# Patient Record
Sex: MALE | Race: WHITE | NOT HISPANIC OR LATINO | Employment: OTHER | URBAN - METROPOLITAN AREA
[De-identification: names, ages, dates, MRNs, and addresses within clinical notes are randomized per-mention and may not be internally consistent; named-entity substitution may affect disease eponyms.]

---

## 2017-01-09 ENCOUNTER — ALLSCRIPTS OFFICE VISIT (OUTPATIENT)
Dept: OTHER | Facility: OTHER | Age: 77
End: 2017-01-09

## 2017-04-21 ENCOUNTER — GENERIC CONVERSION - ENCOUNTER (OUTPATIENT)
Dept: OTHER | Facility: OTHER | Age: 77
End: 2017-04-21

## 2017-04-21 LAB
A/G RATIO (HISTORICAL): 1.5 (ref 1.2–2.2)
ALBUMIN SERPL BCP-MCNC: 4.2 G/DL (ref 3.5–4.8)
ALP SERPL-CCNC: 40 IU/L (ref 39–117)
ALT SERPL W P-5'-P-CCNC: 19 IU/L (ref 0–44)
AST SERPL W P-5'-P-CCNC: 24 IU/L (ref 0–40)
BILIRUB SERPL-MCNC: 0.6 MG/DL (ref 0–1.2)
BUN SERPL-MCNC: 38 MG/DL (ref 8–27)
BUN/CREA RATIO (HISTORICAL): 28 (ref 10–24)
CALCIUM SERPL-MCNC: 9 MG/DL (ref 8.6–10.2)
CHLORIDE SERPL-SCNC: 100 MMOL/L (ref 96–106)
CO2 SERPL-SCNC: 22 MMOL/L (ref 18–29)
CREAT SERPL-MCNC: 1.37 MG/DL (ref 0.76–1.27)
EGFR AFRICAN AMERICAN (HISTORICAL): 57 ML/MIN/1.73
EGFR-AMERICAN CALC (HISTORICAL): 49 ML/MIN/1.73
GLUCOSE SERPL-MCNC: 81 MG/DL (ref 65–99)
INTERPRETATION (HISTORICAL): NORMAL
PDF IMAGE (HISTORICAL): NORMAL
POTASSIUM SERPL-SCNC: 4.2 MMOL/L (ref 3.5–5.2)
SODIUM SERPL-SCNC: 140 MMOL/L (ref 134–144)
TOT. GLOBULIN, SERUM (HISTORICAL): 2.8 G/DL (ref 1.5–4.5)
TOTAL PROTEIN (HISTORICAL): 7 G/DL (ref 6–8.5)

## 2017-04-24 ENCOUNTER — GENERIC CONVERSION - ENCOUNTER (OUTPATIENT)
Dept: OTHER | Facility: OTHER | Age: 77
End: 2017-04-24

## 2017-07-25 ENCOUNTER — GENERIC CONVERSION - ENCOUNTER (OUTPATIENT)
Dept: OTHER | Facility: OTHER | Age: 77
End: 2017-07-25

## 2017-07-25 LAB
A/G RATIO (HISTORICAL): 1.5 (ref 1.2–2.2)
ALBUMIN SERPL BCP-MCNC: 4.3 G/DL (ref 3.5–4.8)
ALP SERPL-CCNC: 40 IU/L (ref 39–117)
ALT SERPL W P-5'-P-CCNC: 20 IU/L (ref 0–44)
AST SERPL W P-5'-P-CCNC: 25 IU/L (ref 0–40)
BASOPHILS # BLD AUTO: 0 X10E3/UL (ref 0–0.2)
BASOPHILS # BLD AUTO: 1 %
BILIRUB SERPL-MCNC: 0.4 MG/DL (ref 0–1.2)
BUN SERPL-MCNC: 36 MG/DL (ref 8–27)
BUN/CREA RATIO (HISTORICAL): 27 (ref 10–24)
CALCIUM SERPL-MCNC: 9.2 MG/DL (ref 8.6–10.2)
CHLORIDE SERPL-SCNC: 101 MMOL/L (ref 96–106)
CHOLEST SERPL-MCNC: 213 MG/DL (ref 100–199)
CHOLEST/HDLC SERPL: 3.4 RATIO UNITS (ref 0–5)
CO2 SERPL-SCNC: 27 MMOL/L (ref 18–29)
CREAT SERPL-MCNC: 1.31 MG/DL (ref 0.76–1.27)
DEPRECATED RDW RBC AUTO: 13.7 % (ref 12.3–15.4)
EGFR AFRICAN AMERICAN (HISTORICAL): 60 ML/MIN/1.73
EGFR-AMERICAN CALC (HISTORICAL): 52 ML/MIN/1.73
EOSINOPHIL # BLD AUTO: 0.2 X10E3/UL (ref 0–0.4)
EOSINOPHIL # BLD AUTO: 4 %
GLUCOSE SERPL-MCNC: 93 MG/DL (ref 65–99)
HCT VFR BLD AUTO: 40 % (ref 37.5–51)
HDLC SERPL-MCNC: 62 MG/DL
HGB BLD-MCNC: 13 G/DL (ref 12.6–17.7)
IMM.GRANULOCYTES (CD4/8) (HISTORICAL): 0 %
IMM.GRANULOCYTES (CD4/8) (HISTORICAL): 0 X10E3/UL (ref 0–0.1)
INTERPRETATION (HISTORICAL): NORMAL
INTERPRETATION (HISTORICAL): NORMAL
LDLC SERPL CALC-MCNC: 138 MG/DL (ref 0–99)
LYMPHOCYTES # BLD AUTO: 1.5 X10E3/UL (ref 0.7–3.1)
LYMPHOCYTES # BLD AUTO: 32 %
MCH RBC QN AUTO: 29 PG (ref 26.6–33)
MCHC RBC AUTO-ENTMCNC: 32.5 G/DL (ref 31.5–35.7)
MCV RBC AUTO: 89 FL (ref 79–97)
MONOCYTES # BLD AUTO: 0.4 X10E3/UL (ref 0.1–0.9)
MONOCYTES (HISTORICAL): 8 %
NEUTROPHILS # BLD AUTO: 2.6 X10E3/UL (ref 1.4–7)
NEUTROPHILS # BLD AUTO: 55 %
PDF IMAGE (HISTORICAL): NORMAL
PLATELET # BLD AUTO: 158 X10E3/UL (ref 150–379)
POTASSIUM SERPL-SCNC: 4.8 MMOL/L (ref 3.5–5.2)
RBC (HISTORICAL): 4.49 X10E6/UL (ref 4.14–5.8)
SODIUM SERPL-SCNC: 141 MMOL/L (ref 134–144)
TOT. GLOBULIN, SERUM (HISTORICAL): 2.8 G/DL (ref 1.5–4.5)
TOTAL PROTEIN (HISTORICAL): 7.1 G/DL (ref 6–8.5)
TRIGL SERPL-MCNC: 63 MG/DL (ref 0–149)
VLDLC SERPL CALC-MCNC: 13 MG/DL (ref 5–40)
WBC # BLD AUTO: 4.6 X10E3/UL (ref 3.4–10.8)

## 2017-07-26 ENCOUNTER — GENERIC CONVERSION - ENCOUNTER (OUTPATIENT)
Dept: OTHER | Facility: OTHER | Age: 77
End: 2017-07-26

## 2017-07-31 ENCOUNTER — ALLSCRIPTS OFFICE VISIT (OUTPATIENT)
Dept: OTHER | Facility: OTHER | Age: 77
End: 2017-07-31

## 2017-07-31 DIAGNOSIS — R07.9 CHEST PAIN: ICD-10-CM

## 2018-01-10 NOTE — RESULT NOTES
Verified Results  * [facility] CHEST PA & LATERAL 18Apr2016 09:38AM Alexa Calvo Order Number: [id number]     Test Name Result Flag Reference   XR CHEST PA & LATERAL (Report)     CHEST     INDICATION: Follow-up pneumonia  COMPARISON: 3/21/2016  VIEWS: Frontal and lateral projections; 2 images     FINDINGS:         The cardiomediastinal silhouette is unremarkable  Near complete resolution of left lung base pneumonia is noted  Osseous structures are age appropriate  IMPRESSION:     Near complete resolution of left lung base pneumonia  Workstation performed: [id number]     Signed by: Karson Contreras MD   4/18/16     * XR CHEST PA & LATERAL [id number] 09:11AM Alexa Calvo Order Number: [id number]     Test Name Result Flag Reference   XR CHEST PA & LATERAL (Report)     CHEST - DUAL ENERGY     INDICATION: Productive cough for 1 month  COMPARISON: 10/21/2009, CT chest 8/18/2012     VIEWS: PA (including soft tissue/bone algorithms) and lateral projections; 4 images     FINDINGS:        Cardiomediastinal silhouette appears unremarkable  Left lower lobe airspace disease most compatible with pneumonia in the appropriate clinical scenario  Lungs are otherwise clear  No pleural effusions  Mild lower thoracic dextroscoliosis with multilevel degenerative changes  IMPRESSION:     Left lower lobe pneumonia  Follow-up recommended after clinical treatment until resolution  ##imslh##imslh     ##fuslh2##fuslh2        Workstation performed: [id number]     Signed by:   Camron Vinson DO   3/21/16       Plan  Pneumonia of left lower lobe due to infectious organism    · * XR CHEST PA & LATERAL; Status:Active;  Requested for:42Yon3735;

## 2018-01-10 NOTE — PROGRESS NOTES
Assessment    1  Encounter for preventive health examination (V70 0) (Z00 00)   2  Former smoker (V15 82) (Q17 395)   3  BMI 25 0-25 9,adult (V85 21) (Z68 25)   4  Overweight (BMI 25 0-29 9) (278 02) (E66 3)   5  Encounter for screening for cardiovascular disorders (V81 2) (Z13 6)   6  Encounter for screening for malignant neoplasm of prostate (V76 44) (Z12 5)   7  Bladder cancer (188 9) (C67 9)   8  Organic impotence (607 84) (N52 9)    Plan  BMI 25 0-25 9,adult, Encounter for screening for cardiovascular disorders, Encounter for  screening for malignant neoplasm of prostate, Overweight (BMI 25 0-29  9)    · (1) CBC/PLT/DIFF; Status:Active; Requested for:71Ouj7141;    · (1) COMPREHENSIVE METABOLIC PANEL; Status:Active; Requested for:94Aqn6447;    · (1) LIPID PANEL, FASTING; Status:Active; Requested for:12Xyd8463;    · (1) PSA (SCREEN) (Dx V76 44 Screen for Prostate Cancer); Status:Active; Requested  for:56Ino3887; Health Maintenance    · DIGITAL RECTAL EXAM; Status:Complete;   Done: 13HAJ1399 01:28PM   · Hemoccult Screening - POC; Status:Complete;   Done: 36AEX1498 01:27PM  Organic impotence    · Viagra 100 MG Oral Tablet; TAKE 1 TABLET ONE HOUR PRIOR TO SEXUAL  ACTIVITY    Discussion/Summary  Impression: health maintenance visit  Pt will have follow up august 2017 with urology for the bladder  Chief Complaint  CPE rmklpn      History of Present Illness  HM, Adult Male: The patient is being seen for a health maintenance evaluation  General Health:   Screening:   HPI: pt is here for a full physical    pt states he feels well    pt already had his flu shot      Review of Systems    Constitutional: No fever or chills, feels well, no tiredness, no recent weight gain or weight loss  Eyes: No complaints of eye pain, no red eyes, no discharge from eyes, no itchy eyes  ENT: no complaints of earache, no hearing loss, no nosebleeds, no nasal discharge, no sore throat, no hoarseness     Cardiovascular: No complaints of slow heart rate, no fast heart rate, no chest pain, no palpitations, no leg claudication, no lower extremity  Respiratory: No complaints of shortness of breath, no wheezing, no cough, no SOB on exertion, no orthopnea or PND  Gastrointestinal: No complaints of abdominal pain, no constipation, no nausea or vomiting, no diarrhea or bloody stools  Genitourinary: No complaints of dysuria, no incontinence, no hesitancy, no nocturia, no genital lesion, no testicular pain  Musculoskeletal: No complaints of arthralgia, no myalgias, no joint swelling or stiffness, no limb pain or swelling  Integumentary: No complaints of skin rash or skin lesions, no itching, no skin wound, no dry skin  Neurological: No compliants of headache, no confusion, no convulsions, no numbness or tingling, no dizziness or fainting, no limb weakness, no difficulty walking  Psychiatric: Is not suicidal, no sleep disturbances, no anxiety or depression, no change in personality, no emotional problems  Endocrine: No complaints of proptosis, no hot flashes, no muscle weakness, no erectile dysfunction, no deepening of the voice, no feelings of weakness  Hematologic/Lymphatic: No complaints of swollen glands, no swollen glands in the neck, does not bleed easily, no easy bruising  Active Problems    1  Bladder cancer (188 9) (C67 9)   2  Former smoker (V15 82) (C96 898)   3  Murmur (785 2) (R01 1)   4  Needs flu shot (V04 81) (Z23)   5  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)   6  Organic impotence (607 84) (N52 9)   7  Poor vision (369 9) (H54 7)   8  Rosacea (695 3) (L71 9)   9  Thrombocytopenia (287 5) (D69 6)   10  Transient ischemic attack (435 9) (G45 9)   11   Well adult on routine health check (V70 0) (Z00 00)    Past Medical History    · History of Acute gastroenteritis (558 9) (K52 9)   · History of Acute maxillary sinusitis (461 0) (J01 00)   · History of Brachial neuritis (723 4) (M54 12)   · History of benign neoplasm of pituitary gland (V12 29) (Z86 018)   · History of dermatitis (V13 3) (Z87 2)   · History of pneumonia (V12 61) (Z87 01)   · History of transient cerebral ischemia (V12 54) (Z86 73)   · History of Noninfectious gastroenteritis (558 9) (K52 9)   · History of Organic impotence (607 84) (N52 9)   · History of Pneumonia of left lower lobe due to infectious organism (483 8) (J18 9)    Surgical History    · History of Brain Surgery    Family History  Mother    · Family history of Family history unobtainable due to orphan status  Family History    · Family history of No Significant Family History    Social History    · Former smoker (V15 82) (S04 029)    Current Meds   1  No Reported Medications Recorded    Allergies    1  No Known Drug Allergies    Vitals   Recorded: 98LNG2938 12:59PM   Temperature 97 1 F   Heart Rate 74   Respiration 18   Systolic 420   Diastolic 66   Height 5 ft 5 5 in   Weight 155 lb    BMI Calculated 25 4   BSA Calculated 1 78     Physical Exam    Constitutional   General appearance: No acute distress, well appearing and well nourished  Eyes   Conjunctiva and lids: No erythema, swelling or discharge  Pupils and irises: Equal, round, reactive to light  Ophthalmoscopic examination: Normal fundi and optic discs  Ears, Nose, Mouth, and Throat   External inspection of ears and nose: Normal     Otoscopic examination: Tympanic membranes translucent with normal light reflex  Canals patent without erythema  Hearing: Normal     Nasal mucosa, septum, and turbinates: Normal without edema or erythema  Lips, teeth, and gums: Normal, good dentition  Oropharynx: Normal with no erythema, edema, exudate or lesions  Neck   Neck: Supple, symmetric, trachea midline, no masses  Thyroid: Normal, no thyromegaly  Pulmonary   Respiratory effort: No increased work of breathing or signs of respiratory distress      Percussion of chest: Normal     Palpation of chest: Normal     Auscultation of lungs: Clear to auscultation  Cardiovascular   Palpation of heart: Normal PMI, no thrills  Auscultation of heart: Normal rate and rhythm, normal S1 and S2, no murmurs  Carotid pulses: 2+ bilaterally  Abdominal aorta: Normal     Femoral pulses: 2+ bilaterally  Pedal pulses: 2+ bilaterally  Examination of extremities for edema and/or varicosities: Normal     Chest   Breasts: Normal, no dimpling or skin changes appreciated  Palpation of breasts and axillae: Normal, no masses palpated  Chest: Normal     Abdomen   Abdomen: Non-tender, no masses  Liver and spleen: No hepatomegaly or splenomegaly  Examination for hernias: No hernias appreciated  Anus, perineum, and rectum: Normal sphincter tone, no masses, no prolapse  Stool sample for occult blood: Negative  Genitourinary   Scrotal contents: Normal testes, no masses  Penis: Normal, no lesions  Digital rectal exam of prostate: Normal size, no masses  Lymphatic   Palpation of lymph nodes in neck: No lymphadenopathy  Palpation of lymph nodes in axillae: No lymphadenopathy  Palpation of lymph nodes in groin: No lymphadenopathy  Palpation of lymph nodes in other areas: No lymphadenopathy  Musculoskeletal   Gait and station: Normal     Inspection/palpation of digits and nails: Normal without clubbing or cyanosis  Inspection/palpation of joints, bones, and muscles: Normal     Range of motion: Normal     Stability: Normal     Muscle strength/tone: Normal     Skin   Skin and subcutaneous tissue: Normal without rashes or lesions  Palpation of skin and subcutaneous tissue: Normal turgor  Neurologic   Cranial nerves: Cranial nerves 2-12 intact  Reflexes: 2+ and symmetric  Sensation: No sensory loss  Psychiatric   Judgment and insight: Normal     Orientation to person, place and time: Normal     Recent and remote memory: Intact      Mood and affect: Normal  Procedure    Procedure: Visual Acuity Test    Indication: routine screening  Inforrmation supplied by a Snellen chart     Results: 20/25 in both eyes with corrective device, 20/25 in the right eye with corrective device, 20/25 in the left eye with corrective device      Signatures   Electronically signed by : Franki Moreno DO; Dec  6 2016  1:29PM EST                       (Author)

## 2018-01-13 VITALS
DIASTOLIC BLOOD PRESSURE: 62 MMHG | RESPIRATION RATE: 18 BRPM | WEIGHT: 160 LBS | SYSTOLIC BLOOD PRESSURE: 110 MMHG | BODY MASS INDEX: 25.71 KG/M2 | HEIGHT: 66 IN | HEART RATE: 78 BPM | TEMPERATURE: 97.8 F

## 2018-01-13 NOTE — RESULT NOTES
Verified Results  * XR CHEST PA & LATERAL [id number] 09:11AM Ernestina Sanchez Order Number: [id number]     Test Name Result Flag Reference   XR CHEST PA & LATERAL (Report)     CHEST - DUAL ENERGY     INDICATION: Productive cough for 1 month  COMPARISON: 10/21/2009, CT chest 8/18/2012     VIEWS: PA (including soft tissue/bone algorithms) and lateral projections; 4 images     FINDINGS:        Cardiomediastinal silhouette appears unremarkable  Left lower lobe airspace disease most compatible with pneumonia in the appropriate clinical scenario  Lungs are otherwise clear  No pleural effusions  Mild lower thoracic dextroscoliosis with multilevel degenerative changes  IMPRESSION:     Left lower lobe pneumonia  Follow-up recommended after clinical treatment until resolution         ##imslh##imslh     ##fuslh2##fuslh2        Workstation performed: [id number]     Signed by:   Phil Davis DO   3/21/16

## 2018-01-14 VITALS
WEIGHT: 154 LBS | TEMPERATURE: 96.3 F | RESPIRATION RATE: 16 BRPM | BODY MASS INDEX: 24.75 KG/M2 | HEIGHT: 66 IN | SYSTOLIC BLOOD PRESSURE: 122 MMHG | DIASTOLIC BLOOD PRESSURE: 70 MMHG | HEART RATE: 82 BPM

## 2018-01-15 NOTE — RESULT NOTES
Verified Results  (1) CBC/PLT/DIFF [id number] 08:27AM Juli Slim     Test Name Result Flag Reference   WBC 4 8 x10E3/uL  3 4-10 8   RBC 4 48 x10E6/uL  4 14-5 80   Hemoglobin 13 1 g/dL  12 6-17 7   Hematocrit 40 6 %  37 5-51 0   MCV 91 fL  79-97   MCH 29 2 pg  26 6-33 0   MCHC 32 3 g/dL  31 5-35 7   RDW 13 2 %  12 3-15 4   Platelets 953 W39Q0/XS L 150-379   Neutrophils 59 %     Lymphs 29 %     Monocytes 8 %     Eos 3 %     Basos 1 %     Neutrophils (Absolute) 2 8 x10E3/uL  1 4-7 0   Lymphs (Absolute) 1 4 x10E3/uL  0 7-3 1   Monocytes(Absolute) 0 4 x10E3/uL  0 1-0 9   Eos (Absolute) 0 2 x10E3/uL  0 0-0 4   Baso (Absolute) 0 0 x10E3/uL  0 0-0 2   Immature Granulocytes 0 %     Immature Grans (Abs) 0 0 x10E3/uL  0 0-0 1     (1) COMPREHENSIVE METABOLIC PANEL [id number] 23:52FX Kalpana Slim     Test Name Result Flag Reference   Glucose, Serum 90 mg/dL  65-99   BUN 37 mg/dL H 8-27   Creatinine, Serum 1 41 mg/dL H 0 76-1 27   eGFR If NonAfricn Am 48 mL/min/1 73 L >59   eGFR If Africn Am 55 mL/min/1 73 L >59   BUN/Creatinine Ratio 26 H 10-22   Sodium, Serum 143 mmol/L  134-144   **Please note reference interval change**   Potassium, Serum 5 0 mmol/L  3 5-5 2   Chloride, Serum 104 mmol/L     **Please note reference interval change**   Carbon Dioxide, Total 27 mmol/L  18-29   Calcium, Serum 9 2 mg/dL  8 6-10 2   Protein, Total, Serum 7 0 g/dL  6 0-8 5   Albumin, Serum 4 2 g/dL  3 5-4 8   Globulin, Total 2 8 g/dL  1 5-4 5   A/G Ratio 1 5  1 1-2 5   Bilirubin, Total 0 4 mg/dL  0 0-1 2   Alkaline Phosphatase, S 35 IU/L L    AST (SGOT) 24 IU/L  0-40   ALT (SGPT) 22 IU/L  0-44     (1) LIPID PANEL, FASTING [id number] 08:27AM Kalpana Slim     Test Name Result Flag Reference   Cholesterol, Total 214 mg/dL H 100-199   Triglycerides 47 mg/dL  0-149   HDL Cholesterol 62 mg/dL  >39   VLDL Cholesterol Fabian 9 mg/dL  5-40   LDL Cholesterol Calc 143 mg/dL H 0-99   T  Chol/HDL Ratio 3 5 ratio units  0 0-5 0   T   Chol/HDL Ratio                                                             Men  Women                                               1/2 Avg  Risk  3 4    3 3                                                   Avg Risk  5 0    4 4                                                2X Avg  Risk  9 6    7 1                                                3X Avg  Risk 23 4   11 0     (1) PSA (SCREEN) (Dx V76 44 Screen for Prostate Cancer) [id number] 08:27AM Floridalma Stallings     Test Name Result Flag Reference   Prostate Specific Ag, Serum 0 3 ng/mL  0 0-4 0   Roche ECLIA methodology  According to the American Urological Association, Serum PSA should  decrease and remain at undetectable levels after radical  prostatectomy  The AUA defines biochemical recurrence as an initial  PSA value 0 2 ng/mL or greater followed by a subsequent confirmatory  PSA value 0 2 ng/mL or greater  Values obtained with different assay methods or kits cannot be used  interchangeably  Results cannot be interpreted as absolute evidence  of the presence or absence of malignant disease  [facility]) Cardiovascular Risk Assessment [id number] 08:27AM Floridalma Stallings     Test Name Result Flag Reference   Interpretation NTAP     PDF Image Not applicable       [facility]) [facility] CKD Program [id number] 08:27AM Floridalma Stallings     Test Name Result Flag Reference   Interpretation Note     -------------------------------  CHRONIC KIDNEY DISEASE:  EGFR, BLOOD PRESSURE, AND PROTEINURIA ASSESSMENT  Estimated GFR has not changed significantly, was 52 and now  is 48 mL/min/1 73mE2  Current eGFR corresponds to CKD stage  3a  Multiply eGFR by 1 159 if patient is   Potassium is within goal and has risen, was 4 4 and now is  5 0 mmol/L  EGFR, BLOOD PRESSURE, AND PROTEINURIA TREATMENT SUGGESTIONS  -  Guidelines recommend a target blood pressure of 140/90 mmHg  or less in CKD patients to reduce cardiovascular risk and  CKD progression   A higher blood pressure target may use   ANEMIA TREATMENT SUGGESTIONS  -  No specific change of treatment is indicated at this time  ANEMIA FOLLOW-UP  -  CBC within 12 months;  -------------------------------  DISCLAIMER  These assessments and treatment suggestions are provided as  a convenience in support of the physician-patient  relationship and are not intended to replace the physician's  clinical judgment  They are derived from the national  guidelines in addition to other evidence and expert opinion  The clinician should consider this information within the  context of clinical opinion and the individual patient  SEE GUIDANCE FOR CHRONIC KIDNEY DISEASE PROGRAM: National  Kidney Foundation Kidney Disease Outcomes Quality Initiative  (KDOQI (TM)), with its limitations and disclaimers, are at  www kidney  org/professionals/KDOQI  Kidney Disease Improving  Global Outcomes (KDIGO) clinical practice guidelines are at  http://kdigo  org/home/guidelines/  The members of  [employer] national advisory panel are listed at  www  [employer] com  This program is intended for patients who  have been diagnosed with stages 3, 4, or pre-dialysis 5 CKD  It is not intended for children, pregnant patients, or  transplant patients  PDF Image            Discussion/Summary   please make appt to discuss labs

## 2018-01-17 NOTE — RESULT NOTES
Discussion/Summary   labs stable     Verified Results  (1) COMPREHENSIVE METABOLIC PANEL [id number] 43:06DN Princess Alberto     Test Name Result Flag Reference   Glucose, Serum 81 mg/dL  65-99   BUN 38 mg/dL H 8-27   Creatinine, Serum 1 37 mg/dL H 0 76-1 27   BUN/Creatinine Ratio 28 H 10-24   Sodium, Serum 140 mmol/L  134-144   Potassium, Serum 4 2 mmol/L  3 5-5 2   Chloride, Serum 100 mmol/L     Carbon Dioxide, Total 22 mmol/L  18-29   Calcium, Serum 9 0 mg/dL  8 6-10 2   Protein, Total, Serum 7 0 g/dL  6 0-8 5   Albumin, Serum 4 2 g/dL  3 5-4 8   Globulin, Total 2 8 g/dL  1 5-4 5   A/G Ratio 1 5  1 2-2 2   Bilirubin, Total 0 6 mg/dL  0 0-1 2   Alkaline Phosphatase, S 40 IU/L     AST (SGOT) 24 IU/L  0-40   ALT (SGPT) 19 IU/L  0-44   eGFR If NonAfricn Am 49 mL/min/1 73 L >59   eGFR If Africn Am 57 mL/min/1 73 L >59     () [facility] CKD Program 21Apr2017 08:05AM Princess Alberto     Test Name Result Flag Reference   Interpretation Note     -------------------------------  CHRONIC KIDNEY DISEASE:  EGFR, BLOOD PRESSURE, AND PROTEINURIA ASSESSMENT  The regression of eGFR with time is not statistically  significant  Current eGFR is 49 mL/min/1 73mE2 corresponding  to CKD stage 3a  Multiply eGFR by 1 159 if patient is    Potassium is within goal and has  decreased, was 5 0 and now is 4 2 mmol/L  EGFR, BLOOD PRESSURE, AND PROTEINURIA TREATMENT SUGGESTIONS  -  Guidelines recommend a target blood pressure of 140/90 mmHg  or less in CKD patients to reduce cardiovascular risk and  CKD progression  A higher blood pressure target may be  appropriate in older individuals to avoid symptomatic  hypotension  Assessment of albuminuria (urine  albumin:creatinine ratio or urine protein:creatinine ratio  preferred) is recommended at least annually in CKD patients  for staging and disease prognosis    EGFR, BLOOD PRESSURE, AND PROTEINURIA FOLLOW-UP  -  fasting Renal Panel within 12 months; Spot Urine Panel is  recommended by KDOQI guidelines, at least yearly;  -  BONE and MINERAL ASSESSMENT  Calcium is within goal and has not changed significantly,  was 9 2 and now is 9 0 mg/dL  Carbon Dioxide is within goal  and has decreased, was 27 and now is 22 mmol/L  KDOQI  guidelines recommend the measurement of 25-hydroxy vitamin D  in patients with CKD  BONE and MINERAL TREATMENT SUGGESTIONS  -  Interpretations require simultaneous measurements of serum  calcium and phosphorus  BONE and MINERAL FOLLOW-UP  -  fasting PTH with Renal Panel and 25-Hydroxy Vitamin D are  recommended by KDOQI guidelines, at least yearly;  -  LIPIDS ASSESSMENT  Most recent order does not include a fasting Lipid Panel  LIPIDS FOLLOW-UP  -  fasting Lipid Panel is due;  -  ANEMIA ASSESSMENT  Most recent order does not include a CBC Panel or iron  studies  ANEMIA FOLLOW-UP  -  CBC within 8 months;  -------------------------------  DISCLAIMER  These assessments and treatment suggestions are provided as  a convenience in support of the physician-patient  relationship and are not intended to replace the physician's  clinical judgment  They are derived from the national  guidelines in addition to other evidence and expert opinion  The clinician should consider this information within the  context of clinical opinion and the individual patient  SEE GUIDANCE FOR CHRONIC KIDNEY DISEASE PROGRAM: National  Kidney Foundation Kidney Disease Outcomes Quality Initiative  (KDOQI (TM)), with its limitations and disclaimers, are at  www kidney  org/professionals/KDOQI  Kidney Disease Improving  Global Outcomes (KDIGO) clinical practice guidelines are at  http://kdigo  org/home/guidelines/  The members of  [employer] national advisory panel are listed at  www  [employer] com  This program is intended for patients who  have been diagnosed with stages 3, 4, or pre-dialysis 5 CKD  It is not intended for children, pregnant patients, or  transplant patients     PDF Image Sierra Piles

## 2018-01-18 NOTE — RESULT NOTES
Verified Results  (1) COMPREHENSIVE METABOLIC PANEL [id number] 16:62AS Macy Reynolds     Test Name Result Flag Reference   Glucose, Serum 93 mg/dL  65-99   BUN 36 mg/dL H 8-27   Creatinine, Serum 1 31 mg/dL H 0 76-1 27   BUN/Creatinine Ratio 27 H 10-24   Sodium, Serum 141 mmol/L  134-144   Potassium, Serum 4 8 mmol/L  3 5-5 2   Chloride, Serum 101 mmol/L     Carbon Dioxide, Total 27 mmol/L  18-29   Calcium, Serum 9 2 mg/dL  8 6-10 2   Protein, Total, Serum 7 1 g/dL  6 0-8 5   Albumin, Serum 4 3 g/dL  3 5-4 8   Globulin, Total 2 8 g/dL  1 5-4 5   A/G Ratio 1 5  1 2-2 2   Bilirubin, Total 0 4 mg/dL  0 0-1 2   Alkaline Phosphatase, S 40 IU/L     AST (SGOT) 25 IU/L  0-40   ALT (SGPT) 20 IU/L  0-44   eGFR If NonAfricn Am 52 mL/min/1 73 L >59   eGFR If Africn Am 60 mL/min/1 73  >59     (1) CBC/PLT/DIFF [id number] 08:02AM Macy Reynolds     Test Name Result Flag Reference   WBC 4 6 x10E3/uL  3 4-10 8   RBC 4 49 x10E6/uL  4 14-5 80   Hemoglobin 13 0 g/dL  12 6-17 7   Hematocrit 40 0 %  37 5-51 0   MCV 89 fL  79-97   MCH 29 0 pg  26 6-33 0   MCHC 32 5 g/dL  31 5-35 7   RDW 13 7 %  12 3-15 4   Platelets 745 A09O4/GQ  150-379   Neutrophils 55 %     Lymphs 32 %     Monocytes 8 %     Eos 4 %     Basos 1 %     Neutrophils (Absolute) 2 6 x10E3/uL  1 4-7 0   Lymphs (Absolute) 1 5 x10E3/uL  0 7-3 1   Monocytes(Absolute) 0 4 x10E3/uL  0 1-0 9   Eos (Absolute) 0 2 x10E3/uL  0 0-0 4   Baso (Absolute) 0 0 x10E3/uL  0 0-0 2   Immature Granulocytes 0 %     Immature Grans (Abs) 0 0 x10E3/uL  0 0-0 1     (1) LIPID PANEL, FASTING [id number] 08:02AM Macy Reynolds     Test Name Result Flag Reference   Cholesterol, Total 213 mg/dL H 100-199   Triglycerides 63 mg/dL  0-149   HDL Cholesterol 62 mg/dL  >39   VLDL Cholesterol Fabian 13 mg/dL  5-40   LDL Cholesterol Calc 138 mg/dL H 0-99   T  Chol/HDL Ratio 3 4 ratio units  0 0-5 0   T   Chol/HDL Ratio                                                             Men  Women 1/2 Avg  Risk  3 4    3 3                                                   Avg Risk  5 0    4 4                                                2X Avg  Risk  9 6    7 1                                                3X Avg  Risk 23 4   11 0     () [facility] CKD Program [id number] 08:02AM Princess Alberto     Test Name Result Flag Reference   Interpretation Note     -------------------------------  CHRONIC KIDNEY DISEASE:  EGFR, BLOOD PRESSURE, AND PROTEINURIA ASSESSMENT  Current eGFR is 52 mL/min/1 73mE2 corresponding to CKD stage  3a  Multiply eGFR by 1 159 if patient is   Potassium is within goal and has risen, was 4 2 and now is  4 8 mmol/L  EGFR, BLOOD PRESSURE, AND PROTEINURIA TREATMENT SUGGESTIONS  -  Guidelines recommend a target blood pressure of 140/90 mmHg  or less in CKD patients to reduce cardiovascular risk and  CKD progression  A higher blood pressure target may be  appropriate in older individuals to avoid symptomatic  hypotension  Assessment of albuminuria (urine  albumin:creatinine ratio or urine protein:creatinine ratio  preferred) is recommended at least annually in CKD patients  for staging and disease prognosis  EGFR, BLOOD PRESSURE, AND PROTEINURIA FOLLOW-UP  -  fasting Renal Panel within 12 months; Spot Urine Panel is  recommended by KDOQI guidelines, at least yearly;  -  BONE and MINERAL ASSESSMENT  Calcium is within goal and has not changed significantly,  was 9 0 and now is 9 2 mg/dL  Carbon Dioxide is within goal  and has risen, was 22 and now is 27 mmol/L  KDOQI guidelines  recommend the measurement of 25-hydroxy vitamin D in  patients with CKD  BONE and MINERAL TREATMENT SUGGESTIONS  -  Interpretations require simultaneous measurements of serum  calcium and phosphorus    BONE and MINERAL FOLLOW-UP  -  fasting PTH with Renal Panel and 25-Hydroxy Vitamin D are  recommended by KDOQI guidelines, at least yearly;  -  LIPIDS ASSESSMENT  LDL-C is high and has not changed significantly, was 143 and  now is 138 mg/dL  Triglyceride is normal and has not changed  significantly, was 47 and now is 63 mg/dL  Non-HDL  Cholesterol is borderline high and has not changed  significantly, was 152 and now is 151 mg/dL  HDL-C is high  and has not changed significantly, was 62 and now is 62  mg/dL  LIPIDS TREATMENT SUGGESTIONS  -  Therapeutic lifestyle changes are always valuable to  maintain optimal blood lipid status (diet, exercise, weight  management)  Begin statin  If statin already in use,  consider increasing dose to achieve at least a 50% LDL  reduction from baseline  Moderate or high intensity statin  is preferred  If statin cannot be tolerated or increased,  alternatives include use of an intestinal agent (ezetimibe  or bile acid sequestrant) or niacin  LIPIDS FOLLOW-UP  -  fasting Lipid Panel within 3 months;  -  ANEMIA ASSESSMENT  Hemoglobin is normal and has not changed significantly, was  13 1 and now is 13 0 g/dL  Hemoglobin target assumes AR is  not in use  ANEMIA TREATMENT SUGGESTIONS  -  No specific change of treatment is indicated at this time  ANEMIA FOLLOW-UP  -  CBC within 12 months;  -------------------------------  DISCLAIMER  These assessments and treatment suggestions are provided as  a convenience in support of the physician-patient  relationship and are not intended to replace the physician's  clinical judgment  They are derived from the national  guidelines in addition to other evidence and expert opinion  The clinician should consider this information within the  context of clinical opinion and the individual patient  SEE GUIDANCE FOR CHRONIC KIDNEY DISEASE PROGRAM: National  Kidney Foundation Kidney Disease Outcomes Quality Initiative  (KDOQI (TM)), with its limitations and disclaimers, are at  www kidney  org/professionals/KDOQI  Kidney Disease Improving  Global Outcomes (KDIGO) clinical practice guidelines are at  http://kdigo  org/home/guidelines/  The members of  Amos[address]ff[address]  57 national advisory panel are listed at  www  [employer] com  This program is intended for patients who  have been diagnosed with stages 3, 4, or pre-dialysis 5 CKD  It is not intended for children, pregnant patients, or  transplant patients  PDF Image          [facility]) Cardiovascular Risk Assessment [id number] 08:02AM Marengo Lior     Test Name Result Flag Reference   Interpretation NTAP     PDF Image Not applicable

## 2018-09-25 ENCOUNTER — CLINICAL SUPPORT (OUTPATIENT)
Dept: FAMILY MEDICINE CLINIC | Facility: CLINIC | Age: 78
End: 2018-09-25
Payer: MEDICARE

## 2018-09-25 DIAGNOSIS — Z23 NEED FOR INFLUENZA VACCINATION: Primary | ICD-10-CM

## 2018-09-25 PROCEDURE — G0008 ADMIN INFLUENZA VIRUS VAC: HCPCS

## 2018-09-25 PROCEDURE — 90662 IIV NO PRSV INCREASED AG IM: CPT

## 2018-10-15 ENCOUNTER — OFFICE VISIT (OUTPATIENT)
Dept: FAMILY MEDICINE CLINIC | Facility: CLINIC | Age: 78
End: 2018-10-15
Payer: COMMERCIAL

## 2018-10-15 ENCOUNTER — TELEPHONE (OUTPATIENT)
Dept: GASTROENTEROLOGY | Facility: AMBULARY SURGERY CENTER | Age: 78
End: 2018-10-15

## 2018-10-15 VITALS
WEIGHT: 156 LBS | HEART RATE: 84 BPM | SYSTOLIC BLOOD PRESSURE: 110 MMHG | BODY MASS INDEX: 25.07 KG/M2 | DIASTOLIC BLOOD PRESSURE: 64 MMHG | HEIGHT: 66 IN | TEMPERATURE: 97.1 F | RESPIRATION RATE: 16 BRPM

## 2018-10-15 DIAGNOSIS — D69.6 THROMBOCYTOPENIA (HCC): ICD-10-CM

## 2018-10-15 DIAGNOSIS — Z12.11 SCREEN FOR COLON CANCER: ICD-10-CM

## 2018-10-15 DIAGNOSIS — Z12.5 SCREENING FOR PROSTATE CANCER: ICD-10-CM

## 2018-10-15 DIAGNOSIS — E78.2 MIXED HYPERLIPIDEMIA: ICD-10-CM

## 2018-10-15 DIAGNOSIS — N18.30 KIDNEY DISEASE, CHRONIC, STAGE III (GFR 30-59 ML/MIN) (HCC): ICD-10-CM

## 2018-10-15 DIAGNOSIS — Z00.00 WELL ADULT EXAM: Primary | ICD-10-CM

## 2018-10-15 DIAGNOSIS — S40.022A CONTUSION OF LEFT UPPER ARM, INITIAL ENCOUNTER: ICD-10-CM

## 2018-10-15 PROCEDURE — 1160F RVW MEDS BY RX/DR IN RCRD: CPT | Performed by: FAMILY MEDICINE

## 2018-10-15 PROCEDURE — 99397 PER PM REEVAL EST PAT 65+ YR: CPT | Performed by: FAMILY MEDICINE

## 2018-10-15 RX ORDER — ATORVASTATIN CALCIUM 10 MG/1
TABLET, FILM COATED ORAL
COMMUNITY
Start: 2017-07-31 | End: 2019-10-09 | Stop reason: ALTCHOICE

## 2018-10-15 NOTE — PROGRESS NOTES
FAMILY PRACTICE HEALTH MAINTENANCE OFFICE VISIT  [facility]    NAME: Suki Herring  AGE: 77 y o  SEX: male  : 1940     DATE: 10/15/2018    Assessment and Plan     Problem List Items Addressed This Visit     Kidney disease, chronic, stage III (GFR 30-59 ml/min) (HCC)    Relevant Orders    TSH, 3rd generation    Mixed hyperlipidemia    Relevant Medications    atorvastatin (LIPITOR) 10 mg tablet    Other Relevant Orders    Comprehensive metabolic panel    Lipid Panel with Direct LDL reflex    Thrombocytopenia (HCC)    Relevant Orders    CBC and differential      Other Visit Diagnoses     Well adult exam    -  Primary    Screen for colon cancer        Relevant Orders    Ambulatory referral to Gastroenterology    Contusion of left upper arm, initial encounter        Relevant Orders    Protime-INR    Screening for prostate cancer        Relevant Orders    PSA, ultrasensitive            · Patient Counseling:   · Nutrition: Stressed importance of a well balanced diet, moderation of sodium/saturated fat, caloric balance and sufficient intake of fiber  · Exercise: Stressed the importance of regular exercise with a goal of 150 minutes per week  · Dental Health: Discussed daily flossing and brushing and regular dental visits   · Alcohol Use:  Recommended moderation of alcohol intake    · Immunizations reviewed will need tetanus at next appt  · Discussed benefits of screening yes  · Discussed the patient's BMI with him  The BMI is in the acceptable range     Return in about 6 months (around 4/15/2019) for Recheck  Chief Complaint     Chief Complaint   Patient presents with    Physical Exam     lj       History of Present Illness     Pt is here for a full physical  States he already had flu shot    Pt states he has lesions on his left arm that popped up 6 months ago,   States they are not itchy            Well Adult Physical   Patient here for a comprehensive physical exam       Diet and Physical Activity  Diet: well balanced diet  Weight concerns: None, patient's BMI is between 18 5-24 9  Exercise: infrequently      Depression Screen  PHQ-9 Depression Screening    PHQ-9:    Frequency of the following problems over the past two weeks:       Little interest or pleasure in doing things:  0 - not at all  Feeling down, depressed, or hopeless:  0 - not at all  PHQ-2 Score:  0          General Health  Hearing: Normal:  bilateral  Vision: wears glasses  Dental: no dental visits for >1 year    Reproductive Health        The following portions of the patient's history were reviewed and updated as appropriate: allergies, current medications, past family history, past medical history, past social history, past surgical history and problem list     Review of Systems     Review of Systems   Constitutional: Negative for activity change, appetite change, chills, diaphoresis, fatigue, fever and unexpected weight change  HENT: Negative for congestion, dental problem, ear pain, mouth sores, sinus pain, sinus pressure, sore throat and trouble swallowing  Eyes: Negative for photophobia, discharge and itching  Respiratory: Negative for apnea, chest tightness and shortness of breath  Cardiovascular: Negative for chest pain, palpitations and leg swelling  Gastrointestinal: Negative for abdominal distention, abdominal pain, blood in stool, nausea and vomiting  Endocrine: Negative for cold intolerance, heat intolerance, polydipsia, polyphagia and polyuria  Genitourinary: Negative for difficulty urinating  Musculoskeletal: Negative for arthralgias  Skin: Negative for color change and wound  Neurological: Negative for dizziness, syncope, speech difficulty and headaches  Hematological: Negative for adenopathy  Psychiatric/Behavioral: Negative for agitation and behavioral problems         Past Medical History     Past Medical History:   Diagnosis Date    Benign neoplasm of pituitary gland ([address] )     Organic impotence     Pneumonia     of left lower lobe due to infectious organism, last assessed 3/22/16, resolved 12/6/16    Transient cerebral ischemia        Past Surgical History     Past Surgical History:   Procedure Laterality Date    BRAIN SURGERY         Social History     Social History     Social History    Marital status: /Civil Union     Spouse name: N/A    Number of children: N/A    Years of education: N/A     Social History Main Topics    Smoking status: Former Smoker    Smokeless tobacco: Never Used    Alcohol use None    Drug use: Unknown    Sexual activity: Not Asked     Other Topics Concern    None     Social History Narrative    None       Family History     Family History   Problem Relation Age of Onset    No Known Problems Mother     No Known Problems Father     Mental illness Neg Hx        Current Medications       Current Outpatient Prescriptions:     atorvastatin (LIPITOR) 10 mg tablet, Take by mouth, Disp: , Rfl:      Allergies     No Known Allergies    Objective     /64   Pulse 84   Temp (!) 97 1 °F (36 2 °C)   Resp 16   Ht 5' 6" (1 676 m)   Wt 70 8 kg (156 lb)   BMI 25 18 kg/m²      Physical Exam   Constitutional: He appears well-developed and well-nourished  No distress  HENT:   Head: Normocephalic and atraumatic  Right Ear: External ear normal    Left Ear: External ear normal    Nose: Nose normal    Mouth/Throat: Oropharynx is clear and moist  No oropharyngeal exudate  Eyes: Pupils are equal, round, and reactive to light  EOM are normal  Right eye exhibits no discharge  Left eye exhibits no discharge  No scleral icterus  Neck: No thyromegaly present  Cardiovascular: Normal rate and normal heart sounds  No murmur heard  Pulmonary/Chest: Effort normal and breath sounds normal  No respiratory distress  He has no wheezes  Abdominal: Soft  Bowel sounds are normal  He exhibits no distension and no mass   There is no tenderness  There is no rebound and no guarding  Musculoskeletal: Normal range of motion  Neurological: He is alert  He displays normal reflexes  Coordination normal    Skin: Skin is warm and dry  No rash noted  He is not diaphoretic  No erythema  Psychiatric: He has a normal mood and affect  His behavior is normal    Nursing note and vitals reviewed           Visual Acuity Screening    Right eye Left eye Both eyes   Without correction:      With correction:   20/40   Comments: Known vision problem--[facility]     Health Maintenance   Topic Date Due    Depression Screening PHQ  1940    DTaP,Tdap,and Td Vaccines (1 - Tdap) 02/23/1960    Fall Risk  02/23/2004    INFLUENZA VACCINE  Completed    Pneumococcal PPSV23/PCV13 65+ Years / Low and Medium Risk  Completed     Immunization History   Administered Date(s) Administered    Influenza Split High Dose Preservative Free IM 10/11/2012, 09/27/2013, 10/13/2014, 10/15/2015, 10/11/2016, 09/21/2017    Influenza TIV (IM) 10/28/2010, 11/02/2011    Influenza, high dose seasonal 0 5 mL 09/25/2018    Pneumococcal Conjugate 13-Valent 01/09/2017    Pneumococcal Polysaccharide PPV23 09/27/2013       Noe Hunter, [address]

## 2018-10-15 NOTE — TELEPHONE ENCOUNTER
10/15/18  Screened by: Ting James    Referring Provider: César Kerr    Pre- Screening: There is no height or weight on file to calculate BMI  Has patient been referred for a routine screening Colonoscopy? yes  Is the patient between 39-70 years old? yes    SCHEDULING STAFF   If the patient is between 45yrs-49yrs, please advise patient to confirm benefits/coverage with their insurance company for a routine screening colonoscopy, some insurance carriers will only cover at [phone] or older   If the patient is over 66years old, please schedule an office visit  Do you have any of the following symptoms? NO    Have you had a coronary or vascular stent within the last year? no    Have you had a heart attack or stroke in the last 6 months? no    Have you had intestinal surgery in the last 3 months? no    Do you have problems with: NO    Do you use: NO    Have you been hospitalized in the last Month? no    Have you been diagnosed with a bleeding disorder or anemia? no    Have you had chest pain (angina) or breathing problems  (COPD) in the last 3 months? no    Do you have any difficulty walking up a flight of stairs? no    Have you had Kidney failure or insufficiency? no    Have you had heart valve surgery? no    Are you confined to a wheelchair? no    Do you take Other blood thinning medications no    Have you been diagnosed with Diabetes or are you taking any   Diabetic medications? no    : If patient answers NO to medical questions, then schedule procedure  If patient answers YES to medical questions, then schedule office appointment  Previous Colonoscopy no  Date and Facility of last colonoscopy? N/A    Comments: [facility] - DR Sabrina Araiza - [phone]

## 2018-10-22 LAB
ALBUMIN SERPL-MCNC: 4.2 G/DL (ref 3.5–4.8)
ALBUMIN/GLOB SERPL: 1.5 {RATIO} (ref 1.2–2.2)
ALP SERPL-CCNC: 52 IU/L (ref 39–117)
ALT SERPL-CCNC: 16 IU/L (ref 0–44)
AST SERPL-CCNC: 30 IU/L (ref 0–40)
BASOPHILS # BLD AUTO: 0 X10E3/UL (ref 0–0.2)
BASOPHILS NFR BLD AUTO: 0 %
BILIRUB SERPL-MCNC: 0.7 MG/DL (ref 0–1.2)
BUN SERPL-MCNC: 35 MG/DL (ref 8–27)
BUN/CREAT SERPL: 25 (ref 10–24)
CALCIUM SERPL-MCNC: 9.1 MG/DL (ref 8.6–10.2)
CHLORIDE SERPL-SCNC: 101 MMOL/L (ref 96–106)
CHOLEST SERPL-MCNC: 186 MG/DL (ref 100–199)
CO2 SERPL-SCNC: 26 MMOL/L (ref 20–29)
CREAT SERPL-MCNC: 1.41 MG/DL (ref 0.76–1.27)
EOSINOPHIL # BLD AUTO: 0.1 X10E3/UL (ref 0–0.4)
EOSINOPHIL NFR BLD AUTO: 2 %
ERYTHROCYTE [DISTWIDTH] IN BLOOD BY AUTOMATED COUNT: 13.1 % (ref 12.3–15.4)
GLOBULIN SER-MCNC: 2.8 G/DL (ref 1.5–4.5)
GLUCOSE SERPL-MCNC: 90 MG/DL (ref 65–99)
HCT VFR BLD AUTO: 38.6 % (ref 37.5–51)
HDLC SERPL-MCNC: 60 MG/DL
HGB BLD-MCNC: 13.4 G/DL (ref 13–17.7)
IMM GRANULOCYTES # BLD: 0 X10E3/UL (ref 0–0.1)
IMM GRANULOCYTES NFR BLD: 0 %
INR PPP: 1 (ref 0.8–1.2)
LABCORP COMMENT: NORMAL
LDLC SERPL CALC-MCNC: 118 MG/DL (ref 0–99)
LYMPHOCYTES # BLD AUTO: 1.3 X10E3/UL (ref 0.7–3.1)
LYMPHOCYTES NFR BLD AUTO: 26 %
MCH RBC QN AUTO: 29.9 PG (ref 26.6–33)
MCHC RBC AUTO-ENTMCNC: 34.7 G/DL (ref 31.5–35.7)
MCV RBC AUTO: 86 FL (ref 79–97)
MICRODELETION SYND BLD/T FISH: NORMAL
MICRODELETION SYND BLD/T FISH: NORMAL
MONOCYTES # BLD AUTO: 0.3 X10E3/UL (ref 0.1–0.9)
MONOCYTES NFR BLD AUTO: 7 %
NEUTROPHILS # BLD AUTO: 3.2 X10E3/UL (ref 1.4–7)
NEUTROPHILS NFR BLD AUTO: 65 %
PLATELET # BLD AUTO: 154 X10E3/UL (ref 150–379)
POTASSIUM SERPL-SCNC: 4.2 MMOL/L (ref 3.5–5.2)
PROT SERPL-MCNC: 7 G/DL (ref 6–8.5)
PROTHROMBIN TIME: 10.9 SEC (ref 9.1–12)
PSA SERPL DL<=0.01 NG/ML-MCNC: 0.43 NG/ML (ref 0–4)
RBC # BLD AUTO: 4.48 X10E6/UL (ref 4.14–5.8)
SL AMB EGFR AFRICAN AMERICAN: 54 ML/MIN/1.73
SL AMB EGFR NON AFRICAN AMERICAN: 47 ML/MIN/1.73
SL AMB PDF IMAGE: NORMAL
SODIUM SERPL-SCNC: 139 MMOL/L (ref 134–144)
TRIGL SERPL-MCNC: 39 MG/DL (ref 0–149)
TSH SERPL DL<=0.005 MIU/L-ACNC: 2.83 UIU/ML (ref 0.45–4.5)
WBC # BLD AUTO: 5 X10E3/UL (ref 3.4–10.8)

## 2019-05-24 ENCOUNTER — TELEPHONE (OUTPATIENT)
Dept: FAMILY MEDICINE CLINIC | Facility: CLINIC | Age: 79
End: 2019-05-24

## 2019-10-09 ENCOUNTER — OFFICE VISIT (OUTPATIENT)
Dept: FAMILY MEDICINE CLINIC | Facility: CLINIC | Age: 79
End: 2019-10-09
Payer: COMMERCIAL

## 2019-10-09 VITALS
HEART RATE: 68 BPM | SYSTOLIC BLOOD PRESSURE: 122 MMHG | HEIGHT: 66 IN | DIASTOLIC BLOOD PRESSURE: 74 MMHG | RESPIRATION RATE: 18 BRPM | BODY MASS INDEX: 24.59 KG/M2 | WEIGHT: 153 LBS | TEMPERATURE: 97.1 F

## 2019-10-09 DIAGNOSIS — C67.9 MALIGNANT NEOPLASM OF URINARY BLADDER, UNSPECIFIED SITE (HCC): ICD-10-CM

## 2019-10-09 DIAGNOSIS — E78.2 MIXED HYPERLIPIDEMIA: ICD-10-CM

## 2019-10-09 DIAGNOSIS — Z00.00 MEDICARE ANNUAL WELLNESS VISIT, INITIAL: Primary | ICD-10-CM

## 2019-10-09 DIAGNOSIS — D44.3 NEOPLASM OF UNCERTAIN BEHAVIOR OF PITUITARY GLAND (HCC): ICD-10-CM

## 2019-10-09 DIAGNOSIS — Z13.820 SCREENING FOR OSTEOPOROSIS: ICD-10-CM

## 2019-10-09 DIAGNOSIS — N18.30 KIDNEY DISEASE, CHRONIC, STAGE III (GFR 30-59 ML/MIN) (HCC): ICD-10-CM

## 2019-10-09 DIAGNOSIS — D69.6 THROMBOCYTOPENIA (HCC): ICD-10-CM

## 2019-10-09 DIAGNOSIS — F09 MILD COGNITIVE DISORDER: ICD-10-CM

## 2019-10-09 DIAGNOSIS — Z23 NEED FOR VACCINATION: ICD-10-CM

## 2019-10-09 DIAGNOSIS — R01.1 MURMUR: ICD-10-CM

## 2019-10-09 PROCEDURE — G0438 PPPS, INITIAL VISIT: HCPCS | Performed by: FAMILY MEDICINE

## 2019-10-09 PROCEDURE — 90662 IIV NO PRSV INCREASED AG IM: CPT

## 2019-10-09 PROCEDURE — 90471 IMMUNIZATION ADMIN: CPT

## 2019-10-09 NOTE — PROGRESS NOTES
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)       Nervous and Auditory    Mild cognitive disorder       Genitourinary    Bladder cancer ([facility] [address] )    Kidney disease, chronic, stage III (GFR 30-59 ml/min) (HCC)    Relevant Orders    Comprehensive metabolic panel       Other    Mixed hyperlipidemia    Relevant Orders    Lipid Panel with Direct LDL reflex    Thrombocytopenia (HCC)    Relevant Orders    CBC      Other Visit Diagnoses     Medicare annual wellness visit, initial    -  Primary    Need for vaccination        Relevant Orders    influenza vaccine, high-dose, PF 0 5 mL    Screening for osteoporosis        Relevant Orders    DXA bone density spine hip and pelvis    Murmur        Relevant Orders    Echo complete with contrast if indicated           Preventive health issues were discussed with patient, and age appropriate screening tests were ordered as noted in patient's After Visit Summary  Personalized health advice and appropriate referrals for health education or preventive services given if needed, as noted in patient's After Visit Summary       History of Present Illness:     Patient presents for Medicare Annual Wellness visit  Pt seen with wife  Would like labs faxed to neurology - pt had a pituitary tumor ten years ago , was seen recently for issues with being forgetful    Patient Care Team:  Ronna San DO as PCP - General (Family Medicine)  Pradeep Lewis MD as Consulting Physician (Neurology)  Marixa Moreno MD as Consulting Physician (Urology)  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[facility] [address] )    Thrombocytopenia ([facility] [address] )    Mild cognitive disorder      Past Medical and Surgical History:     Past Medical History:   Diagnosis Date    Benign neoplasm of pituitary gland ([address] )     Organic impotence     Pneumonia     of left lower lobe due to infectious organism, last assessed 3/22/16, resolved 12/6/16    Transient cerebral ischemia      Past Surgical History:   Procedure Laterality Date    BRAIN SURGERY        Family History:     Family History   Problem Relation Age of Onset    No Known Problems Mother     No Known Problems Father     Mental illness Neg Hx       Social History:     Social History     Socioeconomic History    Marital status: /Civil Union     Spouse name: None    Number of children: None    Years of education: None    Highest education level: None   Occupational History    None   Social Needs    Financial resource strain: None    Food insecurity:     Worry: None     Inability: None    Transportation needs:     Medical: None     Non-medical: None   Tobacco Use    Smoking status: Former Smoker    Smokeless tobacco: Never Used   Substance and Sexual Activity    Alcohol use: None    Drug use: None    Sexual activity: None   Lifestyle    Physical activity:     Days per week: None     Minutes per session: None    Stress: None   Relationships    Social connections:     Talks on phone: None     Gets together: None     Attends [employer] service: None     Active member of club or organization: None     Attends meetings of clubs or organizations: None     Relationship status: None    Intimate partner violence:     Fear of current or ex partner: None     Emotionally abused: None     Physically abused: None     Forced sexual activity: None   Other Topics Concern    None   Social History Narrative    None       Medications and Allergies:     No current outpatient medications on file  No current facility-administered medications for this visit        No Known Allergies   Immunizations:     Immunization History   Administered Date(s) Administered    Influenza Split High Dose Preservative Free IM 10/11/2012, 09/27/2013, 10/13/2014, 10/15/2015, 10/11/2016, 09/21/2017    Influenza TIV (IM) 10/28/2010, 11/02/2011    Influenza, high dose seasonal 0 5 mL 09/25/2018    Pneumococcal Conjugate 13-Valent 01/09/2017    Pneumococcal Polysaccharide PPV23 09/27/2013      Health Maintenance: There are no preventive care reminders to display for this patient  Topic Date Due    HEPATITIS B VACCINES (1 of 3 - Risk 3-dose series) 02/23/1958    DTaP,Tdap,and Td Vaccines (1 - Tdap) 02/23/1960    INFLUENZA VACCINE  07/01/2019      Medicare Health Risk Assessment:     /74   Pulse 68   Temp (!) 97 1 °F (36 2 °C)   Resp 18   Ht 5' 6" (1 676 m)   Wt 69 4 kg (153 lb)   BMI 24 69 kg/m²      Royer Cornejo is here for his Initial Wellness visit  Health Risk Assessment:   Patient rates overall health as very good  Patient feels that their physical health rating is same  Eyesight was rated as same  Hearing was rated as same  Patient feels that their emotional and mental health rating is same  Pain experienced in the last 7 days has been some  Patient's pain rating has been 4/10  Patient states that he has experienced weight loss or gain in last 6 months  Lost a lot of weight -now gaining it back-lj    Depression Screening:   PHQ-2 Score: 0      Fall Risk Screening: In the past year, patient has experienced: no history of falling in past year      Home Safety:  Patient does not have trouble with stairs inside or outside of their home  Patient has working smoke alarms and has working carbon monoxide detector  Home safety hazards include: none  Nutrition:   Current diet is Limited junk food and Regular  Medications:   Patient is not currently taking any over-the-counter supplements  Patient is able to manage medications       Activities of Daily Living (ADLs)/Instrumental Activities of Daily Living (IADLs):   Walk and transfer into and out of bed and chair?: Yes  Dress and groom yourself?: Yes    Bathe or shower yourself?: Yes    Feed yourself?  Yes  Do your laundry/housekeeping?: Yes  Manage your money, pay your bills and track your expenses?: Yes  Make your own meals?: Yes    Do your own shopping?: Yes    Previous Hospitalizations:   Any hospitalizations or ED visits within the last 12 months?: No      Advance Care Planning:   Living will: Yes    Advanced directive: Yes      Cognitive Screening:   Provider or family/friend/caregiver concerned regarding cognition?: Yes    Cognition Comments: Pt seeing neurology, going for a brain scan - was advised this is mild    PREVENTIVE SCREENINGS      Cardiovascular Screening:    General: History Lipid Disorder and Risks and Benefits Discussed    Due for: Lipid Panel      Diabetes Screening:     General: Screening Current and Risks and Benefits Discussed    Due for: Blood Glucose      Colorectal Cancer Screening:     General: Risks and Benefits Discussed and Screening Current      Prostate Cancer Screening:    General: Screening Not Indicated and Risks and Benefits Discussed      Osteoporosis Screening:    General: Risks and Benefits Discussed    Due for: DXA Axial      Abdominal Aortic Aneurysm (AAA) Screening:    Risk factors include: tobacco use        General: Screening Not Indicated      Lung Cancer Screening:     General: Screening Not Indicated      Hepatitis C Screening:    General: Screening Not Indicated        Junie Vazquez DO

## 2019-10-12 ENCOUNTER — TELEPHONE (OUTPATIENT)
Dept: FAMILY MEDICINE CLINIC | Facility: CLINIC | Age: 79
End: 2019-10-12

## 2019-10-12 NOTE — TELEPHONE ENCOUNTER
Pt's wife called requesting his recent BW results be faxed to Dr Bryson Hussein an [facility]  In [place] [phone] and to [facility] she doesn't have the number for there but I believe we do

## 2019-10-14 LAB
ALBUMIN SERPL-MCNC: 4.2 G/DL (ref 3.5–4.7)
ALBUMIN/GLOB SERPL: 1.6 {RATIO} (ref 1.2–2.2)
ALP SERPL-CCNC: 44 IU/L (ref 39–117)
ALT SERPL-CCNC: 21 IU/L (ref 0–44)
AST SERPL-CCNC: 30 IU/L (ref 0–40)
BASOPHILS # BLD AUTO: 0 X10E3/UL (ref 0–0.2)
BASOPHILS NFR BLD AUTO: 1 %
BILIRUB SERPL-MCNC: 0.4 MG/DL (ref 0–1.2)
BUN SERPL-MCNC: 35 MG/DL (ref 8–27)
BUN/CREAT SERPL: 27 (ref 10–24)
CALCIUM SERPL-MCNC: 9.2 MG/DL (ref 8.6–10.2)
CHLORIDE SERPL-SCNC: 104 MMOL/L (ref 96–106)
CHOLEST SERPL-MCNC: 208 MG/DL (ref 100–199)
CO2 SERPL-SCNC: 24 MMOL/L (ref 20–29)
CREAT SERPL-MCNC: 1.3 MG/DL (ref 0.76–1.27)
EOSINOPHIL # BLD AUTO: 0.2 X10E3/UL (ref 0–0.4)
EOSINOPHIL NFR BLD AUTO: 4 %
ERYTHROCYTE [DISTWIDTH] IN BLOOD BY AUTOMATED COUNT: 13.5 % (ref 12.3–15.4)
GLOBULIN SER-MCNC: 2.6 G/DL (ref 1.5–4.5)
GLUCOSE SERPL-MCNC: 86 MG/DL (ref 65–99)
HCT VFR BLD AUTO: 39.1 % (ref 37.5–51)
HDLC SERPL-MCNC: 63 MG/DL
HGB BLD-MCNC: 12.7 G/DL (ref 13–17.7)
IMM GRANULOCYTES # BLD: 0 X10E3/UL (ref 0–0.1)
IMM GRANULOCYTES NFR BLD: 0 %
LDLC SERPL CALC-MCNC: 136 MG/DL (ref 0–99)
LYMPHOCYTES # BLD AUTO: 1.4 X10E3/UL (ref 0.7–3.1)
LYMPHOCYTES NFR BLD AUTO: 31 %
MCH RBC QN AUTO: 29 PG (ref 26.6–33)
MCHC RBC AUTO-ENTMCNC: 32.5 G/DL (ref 31.5–35.7)
MCV RBC AUTO: 89 FL (ref 79–97)
MICRODELETION SYND BLD/T FISH: NORMAL
MICRODELETION SYND BLD/T FISH: NORMAL
MONOCYTES # BLD AUTO: 0.4 X10E3/UL (ref 0.1–0.9)
MONOCYTES NFR BLD AUTO: 8 %
NEUTROPHILS # BLD AUTO: 2.6 X10E3/UL (ref 1.4–7)
NEUTROPHILS NFR BLD AUTO: 56 %
PLATELET # BLD AUTO: 160 X10E3/UL (ref 150–450)
POTASSIUM SERPL-SCNC: 4.9 MMOL/L (ref 3.5–5.2)
PROT SERPL-MCNC: 6.8 G/DL (ref 6–8.5)
RBC # BLD AUTO: 4.38 X10E6/UL (ref 4.14–5.8)
SL AMB EGFR AFRICAN AMERICAN: 60 ML/MIN/1.73
SL AMB EGFR NON AFRICAN AMERICAN: 52 ML/MIN/1.73
SL AMB PDF IMAGE: NORMAL
SODIUM SERPL-SCNC: 142 MMOL/L (ref 134–144)
TRIGL SERPL-MCNC: 43 MG/DL (ref 0–149)
WBC # BLD AUTO: 4.6 X10E3/UL (ref 3.4–10.8)

## 2019-10-14 NOTE — TELEPHONE ENCOUNTER
I faxed BW results to number provided for Dr Brunson Sake  I did leave a message on machine regarding which [facility] they will be going to   [place]

## 2019-10-14 NOTE — TELEPHONE ENCOUNTER
I received confirmation for Dr Delroy Peter  Just waiting on a call back for [facility]   [place], [place]

## 2019-10-15 DIAGNOSIS — E78.2 MIXED HYPERLIPIDEMIA: Primary | ICD-10-CM

## 2019-10-18 NOTE — TELEPHONE ENCOUNTER
Called [facility] has not been there since 2013--they do not gather lab work on patients- lmom for lara did suggest if a copy of labs needed they can pick that up here at office --lj

## 2019-11-01 ENCOUNTER — HOSPITAL ENCOUNTER (OUTPATIENT)
Dept: NON INVASIVE DIAGNOSTICS | Facility: HOSPITAL | Age: 79
Discharge: HOME | End: 2019-11-01
Attending: FAMILY MEDICINE
Payer: COMMERCIAL

## 2019-11-01 DIAGNOSIS — R01.1 MURMUR: ICD-10-CM

## 2019-11-01 PROCEDURE — 93306 TTE W/DOPPLER COMPLETE: CPT | Performed by: INTERNAL MEDICINE

## 2019-11-01 PROCEDURE — 93306 TTE W/DOPPLER COMPLETE: CPT

## 2019-11-19 ENCOUNTER — TELEPHONE (OUTPATIENT)
Dept: FAMILY MEDICINE CLINIC | Facility: CLINIC | Age: 79
End: 2019-11-19

## 2019-11-20 NOTE — TELEPHONE ENCOUNTER
Forward to Dr Georgie Davis    Pt would also like results of ECHO done in hospital that Dr Deanne Rosa ordered     Please call pt      Usama Schumacher MA

## 2019-11-21 NOTE — TELEPHONE ENCOUNTER
Called patient multiple times with no answer  Left messages  Please let me know if he calls back  Echo results have also been mailed

## 2020-10-14 ENCOUNTER — OFFICE VISIT (OUTPATIENT)
Dept: FAMILY MEDICINE CLINIC | Facility: CLINIC | Age: 80
End: 2020-10-14
Payer: COMMERCIAL

## 2020-10-14 VITALS
OXYGEN SATURATION: 96 % | TEMPERATURE: 98 F | RESPIRATION RATE: 18 BRPM | HEART RATE: 71 BPM | SYSTOLIC BLOOD PRESSURE: 120 MMHG | WEIGHT: 150 LBS | DIASTOLIC BLOOD PRESSURE: 72 MMHG | BODY MASS INDEX: 25.61 KG/M2 | HEIGHT: 64 IN

## 2020-10-14 DIAGNOSIS — Z00.00 MEDICARE ANNUAL WELLNESS VISIT, SUBSEQUENT: Primary | ICD-10-CM

## 2020-10-14 DIAGNOSIS — Z12.11 SCREEN FOR COLON CANCER: ICD-10-CM

## 2020-10-14 DIAGNOSIS — D69.6 THROMBOCYTOPENIA (HCC): ICD-10-CM

## 2020-10-14 DIAGNOSIS — Z23 NEED FOR INFLUENZA VACCINATION: ICD-10-CM

## 2020-10-14 DIAGNOSIS — C67.9 MALIGNANT NEOPLASM OF URINARY BLADDER, UNSPECIFIED SITE (HCC): ICD-10-CM

## 2020-10-14 DIAGNOSIS — Z13.6 SCREENING FOR CARDIOVASCULAR CONDITION: ICD-10-CM

## 2020-10-14 PROCEDURE — 90662 IIV NO PRSV INCREASED AG IM: CPT

## 2020-10-14 PROCEDURE — 1036F TOBACCO NON-USER: CPT | Performed by: FAMILY MEDICINE

## 2020-10-14 PROCEDURE — 3725F SCREEN DEPRESSION PERFORMED: CPT | Performed by: FAMILY MEDICINE

## 2020-10-14 PROCEDURE — G0008 ADMIN INFLUENZA VIRUS VAC: HCPCS

## 2020-10-14 PROCEDURE — 1170F FXNL STATUS ASSESSED: CPT | Performed by: FAMILY MEDICINE

## 2020-10-14 PROCEDURE — 1101F PT FALLS ASSESS-DOCD LE1/YR: CPT | Performed by: FAMILY MEDICINE

## 2020-10-14 PROCEDURE — 1160F RVW MEDS BY RX/DR IN RCRD: CPT | Performed by: FAMILY MEDICINE

## 2020-10-14 PROCEDURE — 3288F FALL RISK ASSESSMENT DOCD: CPT | Performed by: FAMILY MEDICINE

## 2020-10-14 PROCEDURE — G0438 PPPS, INITIAL VISIT: HCPCS | Performed by: FAMILY MEDICINE

## 2020-10-14 PROCEDURE — 1125F AMNT PAIN NOTED PAIN PRSNT: CPT | Performed by: FAMILY MEDICINE

## 2020-10-28 LAB
ALBUMIN SERPL-MCNC: 4 G/DL (ref 3.6–4.6)
ALBUMIN/GLOB SERPL: 1.6 {RATIO} (ref 1.2–2.2)
ALP SERPL-CCNC: 41 IU/L (ref 39–117)
ALT SERPL-CCNC: 20 IU/L (ref 0–44)
AST SERPL-CCNC: 27 IU/L (ref 0–40)
BILIRUB SERPL-MCNC: 0.4 MG/DL (ref 0–1.2)
BUN SERPL-MCNC: 36 MG/DL (ref 8–27)
BUN/CREAT SERPL: 27 (ref 10–24)
CALCIUM SERPL-MCNC: 8.7 MG/DL (ref 8.6–10.2)
CHLORIDE SERPL-SCNC: 103 MMOL/L (ref 96–106)
CHOLEST SERPL-MCNC: 184 MG/DL (ref 100–199)
CO2 SERPL-SCNC: 24 MMOL/L (ref 20–29)
CREAT SERPL-MCNC: 1.34 MG/DL (ref 0.76–1.27)
GLOBULIN SER-MCNC: 2.5 G/DL (ref 1.5–4.5)
GLUCOSE SERPL-MCNC: 98 MG/DL (ref 65–99)
HDLC SERPL-MCNC: 68 MG/DL
LDLC SERPL CALC-MCNC: 107 MG/DL (ref 0–99)
MICRODELETION SYND BLD/T FISH: NORMAL
MICRODELETION SYND BLD/T FISH: NORMAL
POTASSIUM SERPL-SCNC: 4.2 MMOL/L (ref 3.5–5.2)
PROT SERPL-MCNC: 6.5 G/DL (ref 6–8.5)
SL AMB EGFR AFRICAN AMERICAN: 57 ML/MIN/1.73
SL AMB EGFR NON AFRICAN AMERICAN: 49 ML/MIN/1.73
SL AMB PDF IMAGE: NORMAL
SODIUM SERPL-SCNC: 141 MMOL/L (ref 134–144)
TRIGL SERPL-MCNC: 44 MG/DL (ref 0–149)

## 2021-02-17 ENCOUNTER — IMMUNIZATIONS (OUTPATIENT)
Dept: FAMILY MEDICINE CLINIC | Facility: HOSPITAL | Age: 81
End: 2021-02-17

## 2021-02-17 DIAGNOSIS — Z23 ENCOUNTER FOR IMMUNIZATION: Primary | ICD-10-CM

## 2021-02-17 PROCEDURE — 0011A SARS-COV-2 / COVID-19 MRNA VACCINE (MODERNA) 100 MCG: CPT

## 2021-02-17 PROCEDURE — 91301 SARS-COV-2 / COVID-19 MRNA VACCINE (MODERNA) 100 MCG: CPT

## 2021-03-17 ENCOUNTER — IMMUNIZATIONS (OUTPATIENT)
Dept: FAMILY MEDICINE CLINIC | Facility: HOSPITAL | Age: 81
End: 2021-03-17

## 2021-03-17 DIAGNOSIS — Z23 ENCOUNTER FOR IMMUNIZATION: Primary | ICD-10-CM

## 2021-03-17 PROCEDURE — 91301 SARS-COV-2 / COVID-19 MRNA VACCINE (MODERNA) 100 MCG: CPT

## 2021-03-17 PROCEDURE — 0012A SARS-COV-2 / COVID-19 MRNA VACCINE (MODERNA) 100 MCG: CPT

## 2021-09-09 ENCOUNTER — TELEPHONE (OUTPATIENT)
Dept: FAMILY MEDICINE CLINIC | Facility: CLINIC | Age: 81
End: 2021-09-09

## 2021-10-11 LAB
ALBUMIN SERPL-MCNC: 4.2 G/DL (ref 3.6–4.6)
ALBUMIN/GLOB SERPL: 1.6 {RATIO} (ref 1.2–2.2)
ALP SERPL-CCNC: 42 IU/L (ref 44–121)
ALT SERPL-CCNC: 19 IU/L (ref 0–44)
AST SERPL-CCNC: 29 IU/L (ref 0–40)
BILIRUB SERPL-MCNC: 0.7 MG/DL (ref 0–1.2)
BUN SERPL-MCNC: 42 MG/DL (ref 8–27)
BUN/CREAT SERPL: 28 (ref 10–24)
CALCIUM SERPL-MCNC: 9.2 MG/DL (ref 8.6–10.2)
CHLORIDE SERPL-SCNC: 101 MMOL/L (ref 96–106)
CHOLEST SERPL-MCNC: 186 MG/DL (ref 100–199)
CO2 SERPL-SCNC: 24 MMOL/L (ref 20–29)
CREAT SERPL-MCNC: 1.5 MG/DL (ref 0.76–1.27)
GLOBULIN SER-MCNC: 2.6 G/DL (ref 1.5–4.5)
GLUCOSE SERPL-MCNC: 85 MG/DL (ref 65–99)
HDLC SERPL-MCNC: 63 MG/DL
LDLC SERPL CALC-MCNC: 117 MG/DL (ref 0–99)
MICRODELETION SYND BLD/T FISH: NORMAL
MICRODELETION SYND BLD/T FISH: NORMAL
POTASSIUM SERPL-SCNC: 5 MMOL/L (ref 3.5–5.2)
PROT SERPL-MCNC: 6.8 G/DL (ref 6–8.5)
PSA SERPL-MCNC: 0.3 NG/ML (ref 0–4)
SL AMB EGFR AFRICAN AMERICAN: 49 ML/MIN/1.73
SL AMB EGFR NON AFRICAN AMERICAN: 43 ML/MIN/1.73
SODIUM SERPL-SCNC: 140 MMOL/L (ref 134–144)
TRIGL SERPL-MCNC: 31 MG/DL (ref 0–149)

## 2021-10-27 ENCOUNTER — OFFICE VISIT (OUTPATIENT)
Dept: FAMILY MEDICINE CLINIC | Facility: CLINIC | Age: 81
End: 2021-10-27
Payer: COMMERCIAL

## 2021-10-27 VITALS
WEIGHT: 147 LBS | DIASTOLIC BLOOD PRESSURE: 60 MMHG | HEIGHT: 63 IN | BODY MASS INDEX: 26.05 KG/M2 | OXYGEN SATURATION: 97 % | HEART RATE: 62 BPM | SYSTOLIC BLOOD PRESSURE: 102 MMHG | RESPIRATION RATE: 14 BRPM | TEMPERATURE: 97.8 F

## 2021-10-27 DIAGNOSIS — Z13.820 SCREENING FOR OSTEOPOROSIS: ICD-10-CM

## 2021-10-27 DIAGNOSIS — Z12.11 SCREEN FOR COLON CANCER: ICD-10-CM

## 2021-10-27 DIAGNOSIS — C67.9 MALIGNANT NEOPLASM OF URINARY BLADDER, UNSPECIFIED SITE (HCC): ICD-10-CM

## 2021-10-27 DIAGNOSIS — D69.6 THROMBOCYTOPENIA (HCC): ICD-10-CM

## 2021-10-27 DIAGNOSIS — N18.30 STAGE 3 CHRONIC KIDNEY DISEASE, UNSPECIFIED WHETHER STAGE 3A OR 3B CKD (HCC): ICD-10-CM

## 2021-10-27 DIAGNOSIS — Z23 NEED FOR VACCINATION: ICD-10-CM

## 2021-10-27 DIAGNOSIS — E78.2 MIXED HYPERLIPIDEMIA: ICD-10-CM

## 2021-10-27 DIAGNOSIS — Z00.00 MEDICARE ANNUAL WELLNESS VISIT, SUBSEQUENT: Primary | ICD-10-CM

## 2021-10-27 PROCEDURE — 90662 IIV NO PRSV INCREASED AG IM: CPT

## 2021-10-27 PROCEDURE — G0008 ADMIN INFLUENZA VIRUS VAC: HCPCS

## 2021-10-27 PROCEDURE — G0439 PPPS, SUBSEQ VISIT: HCPCS | Performed by: FAMILY MEDICINE

## 2021-12-20 ENCOUNTER — OFFICE VISIT (OUTPATIENT)
Dept: FAMILY MEDICINE CLINIC | Facility: CLINIC | Age: 81
End: 2021-12-20
Payer: COMMERCIAL

## 2021-12-20 ENCOUNTER — TELEPHONE (OUTPATIENT)
Dept: FAMILY MEDICINE CLINIC | Facility: CLINIC | Age: 81
End: 2021-12-20

## 2021-12-20 VITALS
WEIGHT: 150 LBS | SYSTOLIC BLOOD PRESSURE: 118 MMHG | HEART RATE: 84 BPM | BODY MASS INDEX: 26.58 KG/M2 | DIASTOLIC BLOOD PRESSURE: 72 MMHG | TEMPERATURE: 97.8 F | OXYGEN SATURATION: 98 % | HEIGHT: 63 IN | RESPIRATION RATE: 16 BRPM

## 2021-12-20 DIAGNOSIS — H10.32 ACUTE CONJUNCTIVITIS OF LEFT EYE, UNSPECIFIED ACUTE CONJUNCTIVITIS TYPE: Primary | ICD-10-CM

## 2021-12-20 PROCEDURE — 99213 OFFICE O/P EST LOW 20 MIN: CPT | Performed by: NURSE PRACTITIONER

## 2021-12-20 RX ORDER — PRASTERONE (DHEA) 25 MG
CAPSULE ORAL
COMMUNITY

## 2021-12-20 RX ORDER — MOXIFLOXACIN 5 MG/ML
1 SOLUTION/ DROPS OPHTHALMIC 3 TIMES DAILY
Qty: 3 ML | Refills: 0 | Status: SHIPPED | OUTPATIENT
Start: 2021-12-20 | End: 2021-12-27

## 2021-12-20 RX ORDER — MAGNESIUM GLUCONATE 30 MG(550)
TABLET ORAL
COMMUNITY

## 2021-12-20 RX ORDER — ERYTHROMYCIN 5 MG/G
0.5 OINTMENT OPHTHALMIC EVERY 12 HOURS SCHEDULED
Qty: 3.5 G | Refills: 0 | Status: SHIPPED | OUTPATIENT
Start: 2021-12-20

## 2021-12-27 ENCOUNTER — IMMUNIZATIONS (OUTPATIENT)
Dept: FAMILY MEDICINE CLINIC | Facility: HOSPITAL | Age: 81
End: 2021-12-27

## 2021-12-27 DIAGNOSIS — Z23 ENCOUNTER FOR IMMUNIZATION: Primary | ICD-10-CM

## 2021-12-27 PROCEDURE — 0064A COVID-19 MODERNA VACC 0.25 ML BOOSTER: CPT

## 2021-12-27 PROCEDURE — 91306 COVID-19 MODERNA VACC 0.25 ML BOOSTER: CPT

## 2022-03-07 ENCOUNTER — HOSPITAL ENCOUNTER (OUTPATIENT)
Dept: RADIOLOGY | Facility: HOSPITAL | Age: 82
Discharge: HOME | End: 2022-03-07
Attending: FAMILY MEDICINE
Payer: COMMERCIAL

## 2022-03-07 ENCOUNTER — TELEPHONE (OUTPATIENT)
Dept: FAMILY MEDICINE CLINIC | Facility: CLINIC | Age: 82
End: 2022-03-07

## 2022-03-07 DIAGNOSIS — Z00.00 MEDICARE ANNUAL WELLNESS VISIT, SUBSEQUENT: ICD-10-CM

## 2022-03-07 DIAGNOSIS — Z13.820 SCREENING FOR OSTEOPOROSIS: ICD-10-CM

## 2022-03-07 PROCEDURE — 77080 DXA BONE DENSITY AXIAL: CPT

## 2022-03-07 NOTE — TELEPHONE ENCOUNTER
Please call pt let him know he is osteopenic  So his bone density is lower than it should be but not osteoporotic  I would suggest 1200 of calcium daily as well as 1000 vit d daily    Light weight bearing exercise

## 2022-03-07 NOTE — TELEPHONE ENCOUNTER
Lam Damon  Pt's wife  aware of taking 1200mg calcium and 1000mg Vitamin D daily  Also aware light weight exercises as well   No questions  NFA at this time    [id number]

## 2022-10-05 ENCOUNTER — RA CDI HCC (OUTPATIENT)
Dept: OTHER | Facility: HOSPITAL | Age: 82
End: 2022-10-05

## 2022-10-05 NOTE — PROGRESS NOTES
D44 3  [facility] 75  coding opportunities          Chart Reviewed number of suggestions sent to Provider: 1     Patients Insurance     Medicare Insurance: [employer]

## 2022-10-12 ENCOUNTER — OFFICE VISIT (OUTPATIENT)
Dept: FAMILY MEDICINE CLINIC | Facility: CLINIC | Age: 82
End: 2022-10-12
Payer: COMMERCIAL

## 2022-10-12 VITALS
WEIGHT: 145 LBS | HEIGHT: 64 IN | OXYGEN SATURATION: 96 % | SYSTOLIC BLOOD PRESSURE: 100 MMHG | DIASTOLIC BLOOD PRESSURE: 66 MMHG | TEMPERATURE: 99.2 F | BODY MASS INDEX: 24.75 KG/M2 | RESPIRATION RATE: 18 BRPM | HEART RATE: 75 BPM

## 2022-10-12 DIAGNOSIS — C67.9 MALIGNANT NEOPLASM OF URINARY BLADDER, UNSPECIFIED SITE (HCC): ICD-10-CM

## 2022-10-12 DIAGNOSIS — Z12.5 SCREENING FOR PROSTATE CANCER: ICD-10-CM

## 2022-10-12 DIAGNOSIS — Z12.11 SCREEN FOR COLON CANCER: ICD-10-CM

## 2022-10-12 DIAGNOSIS — Z13.6 SCREENING FOR CARDIOVASCULAR CONDITION: ICD-10-CM

## 2022-10-12 DIAGNOSIS — E78.2 MIXED HYPERLIPIDEMIA: ICD-10-CM

## 2022-10-12 DIAGNOSIS — Z01.818 PRE-OP EXAMINATION: ICD-10-CM

## 2022-10-12 DIAGNOSIS — H25.9 AGE-RELATED CATARACT OF BOTH EYES, UNSPECIFIED AGE-RELATED CATARACT TYPE: Primary | ICD-10-CM

## 2022-10-12 DIAGNOSIS — D44.3 NEOPLASM OF UNCERTAIN BEHAVIOR OF PITUITARY GLAND (HCC): ICD-10-CM

## 2022-10-12 DIAGNOSIS — D69.6 THROMBOCYTOPENIA (HCC): ICD-10-CM

## 2022-10-12 DIAGNOSIS — N18.30 STAGE 3 CHRONIC KIDNEY DISEASE, UNSPECIFIED WHETHER STAGE 3A OR 3B CKD (HCC): ICD-10-CM

## 2022-10-12 PROCEDURE — 93000 ELECTROCARDIOGRAM COMPLETE: CPT | Performed by: FAMILY MEDICINE

## 2022-10-12 PROCEDURE — 99214 OFFICE O/P EST MOD 30 MIN: CPT | Performed by: FAMILY MEDICINE

## 2022-10-12 RX ORDER — BACILLUS COAGULANS/INULIN 1B-250 MG
CAPSULE ORAL
COMMUNITY

## 2022-10-12 NOTE — PROGRESS NOTES
FAMILY PRACTICE PRE-OPERATIVE EVALUATION  [facility]    NAME: Kylah Nj  AGE: 79 y o  SEX: male  : 1940     DATE: 10/12/2022    Family Practice Pre-Operative Evaluation      Chief Complaint: Pre-operative Evaluation     Surgery: Cataracts  Anticipated Date of Surgery: 10/17,10/31  Surgeon: Dr Rain Mccallum      History of Present Illness:     Pt is here to review chronic conditions prior to surgery      Anesthesia: Wife reports "Just local"  Bleeding Risk: no recent abnormal bleeding, no remote history of abnormal bleeding and no use of Ca-channel blockers  Current Anti-platelet/anticoagulation medication: fish oil    Assessment of Cardiac Risk:  Denies unstable or severe angina or MI in the last 6 weeks or history of stent placement in the last year   Denies decompensated heart failure (e g  New onset heart failure, NYHA functional class IV heart failure, or worsening existing heart failure)  Denies significant arrhythmias such as high grade AV block, symptomatic ventricular arrhythmia, newly recognized ventricular tachycardia, supraventricular tachycardia with resting heart rate >100, or symptomatic bradycardia  Denies severe heart valve disease including aortic stenosis or symptomatic mitral stenosis     Exercise Capacity:  Able to walk 4 blocks without symptoms?: Yes  Able to walk 2 flights without symptoms?: Yes    Prior Anesthesia Reactions: No     Personal history of venous thromboembolic disease? No    History of steroid use for >2 weeks within last year? No         Review of Systems:     Review of Systems   Constitutional: Negative for activity change, appetite change, chills, diaphoresis, fatigue, fever and unexpected weight change  HENT: Negative for congestion, dental problem, ear pain, mouth sores, sinus pressure, sinus pain, sore throat and trouble swallowing  Eyes: Negative for photophobia, discharge and itching     Respiratory: Negative for apnea, chest tightness and shortness of breath  Cardiovascular: Negative for chest pain, palpitations and leg swelling  Gastrointestinal: Negative for abdominal distention, abdominal pain, blood in stool, nausea and vomiting  Endocrine: Negative for cold intolerance, heat intolerance, polydipsia, polyphagia and polyuria  Genitourinary: Negative for difficulty urinating  Musculoskeletal: Negative for arthralgias  Skin: Negative for color change and wound  Neurological: Negative for dizziness, syncope, speech difficulty and headaches  Hematological: Negative for adenopathy  Psychiatric/Behavioral: Negative for agitation and behavioral problems         Current Problem List:     Patient Active Problem List   Diagnosis   • Bladder cancer ([facility] 75 )   • Kidney disease, chronic, stage III (GFR 30-59 ml/min) (HCC)   • Mixed hyperlipidemia   • 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[facility])   • Thrombocytopenia (HCC)   • Mild cognitive disorder   • Rheumatic mitral regurgitation   • 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[facility])       Allergies:     No Known Allergies    Current Medications:       Current Outpatient Medications:   •  Bacillus Coagulans-Inulin (Probiotic) 1-250 BILLION-MG CAPS, , Disp: , Rfl:   •  Bioflavonoid Products (SYMONE-C/BIOFLAVONOIDS PO), , Disp: , Rfl:   •  DHEA 25 MG CAPS, , Disp: , Rfl:   •  GINKGO BILOBA COMPLEX PO, , Disp: , Rfl:   •  Magnesium Gluconate 550 MG TABS, , Disp: , Rfl:   •  Multiple Vitamins-Minerals (ZINC PO), , Disp: , Rfl:   •  Omega-3 Fatty Acids (OMEGA 3 PO), , Disp: , Rfl:     Past Medical History:       Past Medical History:   Diagnosis Date   • Benign neoplasm of pituitary gland ([facility] )    • Organic impotence    • Pneumonia     of left lower lobe due to infectious organism, last assessed 3/22/16, resolved 12/6/16   • Transient cerebral ischemia         Past Surgical History:   Procedure Laterality Date   • BRAIN SURGERY          Family History Problem Relation Age of Onset   • No Known Problems Mother    • No Known Problems Father    • Mental illness Neg Hx         Social History     Socioeconomic History   • Marital status: /Civil Union     Spouse name: Not on file   • Number of children: Not on file   • Years of education: Not on file   • Highest education level: Not on file   Occupational History   • Not on file   Tobacco Use   • Smoking status: Former Smoker     Types: Cigarettes   • Smokeless tobacco: Never Used   Vaping Use   • Vaping Use: Never used   Substance and Sexual Activity   • Alcohol use: Yes     Comment: rare   • Drug use: Never   • Sexual activity: Not on file   Other Topics Concern   • Not on file   Social History Narrative   • Not on file     Social Determinants of Health     Financial Resource Strain: Not on file   Food Insecurity: Not on file   Transportation Needs: Not on file   Physical Activity: Not on file   Stress: Not on file   Social Connections: Not on file   Intimate Partner Violence: Not on file   Housing Stability: Not on file        Physical Exam:     /66   Pulse 75   Temp 99 2 °F (37 3 °C)   Resp 18   Ht 5' 4" (1 626 m)   Wt 65 8 kg (145 lb)   SpO2 96%   BMI 24 89 kg/m²     Physical Exam  Vitals and nursing note reviewed  Constitutional:       General: He is not in acute distress  Appearance: He is well-developed  He is not diaphoretic  HENT:      Head: Normocephalic and atraumatic  Right Ear: External ear normal       Left Ear: External ear normal       Nose: Nose normal       Mouth/Throat:      Pharynx: No oropharyngeal exudate  Eyes:      General: No scleral icterus  Right eye: No discharge  Left eye: No discharge  Pupils: Pupils are equal, round, and reactive to light  Neck:      Thyroid: No thyromegaly  Cardiovascular:      Rate and Rhythm: Normal rate  Heart sounds: Murmur heard     Pulmonary:      Effort: Pulmonary effort is normal  No respiratory distress  Breath sounds: Normal breath sounds  No wheezing  Abdominal:      General: Bowel sounds are normal  There is no distension  Palpations: Abdomen is soft  There is no mass  Tenderness: There is no abdominal tenderness  There is no guarding or rebound  Musculoskeletal:         General: Normal range of motion  Skin:     General: Skin is warm and dry  Findings: No erythema or rash  Neurological:      Mental Status: He is alert  Coordination: Coordination normal       Deep Tendon Reflexes: Reflexes normal    Psychiatric:         Behavior: Behavior normal           Data:     Pre-operative work-up    Laboratory Results: non ordered     EKG: I have personally reviewed pertinent reports  and NSR no ischemia    No results found for this or any previous visit (from the past 672 hour(s))  Assessment & Recommendations:     Problem List Items Addressed This Visit        Endocrine    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)       Hematopoietic and Hemostatic    Thrombocytopenia (HCC)       Genitourinary    Bladder cancer ([facility] )    Kidney disease, chronic, stage III (GFR 30-59 ml/min) (HCC)       Other    Mixed hyperlipidemia    Relevant Orders    POCT ECG (Completed)      Other Visit Diagnoses     Age-related cataract of both eyes, unspecified age-related cataract type    -  Primary    Pre-op examination        Relevant Orders    POCT ECG (Completed)    Screening for cardiovascular condition        Relevant Orders    Comprehensive metabolic panel    Lipid Panel with Direct LDL reflex    Screening for prostate cancer        Relevant Orders    PSA, Total Screen    Screen for colon cancer        Relevant Orders    Cologuard          Pre-Op Evaluation Assessment  79 y o  male with planned surgery: Cataract surgery  Known risk factors for perioperative complications: None          Current medications which may produce withdrawal symptoms if withheld perioperatively: none    Pre-Op Evaluation Plan  1  Further preoperative workup as follows:   - None; no further preoperative work-up is required    2  Medication Management/Recommendations:   - Not applicable, not on any medications    3  Prophylaxis for cardiac events with perioperative beta-blockers: not indicated  4  Patient requires further consultation with: None    Clearance  Patient is CLEARED for surgery without any additional cardiac testing       Francisco Britton, [address]  [address]  [address]  [place] [address]  Phone#  [phone]  Fax#  [phone]

## 2022-10-30 LAB
ALBUMIN SERPL-MCNC: 4.3 G/DL (ref 3.6–4.6)
ALBUMIN/GLOB SERPL: 1.5 {RATIO} (ref 1.2–2.2)
ALP SERPL-CCNC: 40 IU/L (ref 44–121)
ALT SERPL-CCNC: 19 IU/L (ref 0–44)
AST SERPL-CCNC: 28 IU/L (ref 0–40)
BILIRUB SERPL-MCNC: 0.5 MG/DL (ref 0–1.2)
BUN SERPL-MCNC: 43 MG/DL (ref 8–27)
BUN/CREAT SERPL: 29 (ref 10–24)
CALCIUM SERPL-MCNC: 9.4 MG/DL (ref 8.6–10.2)
CHLORIDE SERPL-SCNC: 105 MMOL/L (ref 96–106)
CO2 SERPL-SCNC: 23 MMOL/L (ref 20–29)
CREAT SERPL-MCNC: 1.48 MG/DL (ref 0.76–1.27)
EGFR: 47 ML/MIN/1.73
GLOBULIN SER-MCNC: 2.9 G/DL (ref 1.5–4.5)
GLUCOSE SERPL-MCNC: 91 MG/DL (ref 70–99)
MICRODELETION SYND BLD/T FISH: NORMAL
POTASSIUM SERPL-SCNC: 4.4 MMOL/L (ref 3.5–5.2)
PROT SERPL-MCNC: 7.2 G/DL (ref 6–8.5)
PSA SERPL-MCNC: 0.3 NG/ML (ref 0–4)
SODIUM SERPL-SCNC: 143 MMOL/L (ref 134–144)

## 2022-11-09 ENCOUNTER — OFFICE VISIT (OUTPATIENT)
Dept: FAMILY MEDICINE CLINIC | Facility: CLINIC | Age: 82
End: 2022-11-09

## 2022-11-09 VITALS
BODY MASS INDEX: 24.32 KG/M2 | HEART RATE: 60 BPM | RESPIRATION RATE: 16 BRPM | WEIGHT: 146 LBS | OXYGEN SATURATION: 100 % | SYSTOLIC BLOOD PRESSURE: 122 MMHG | HEIGHT: 65 IN | TEMPERATURE: 97.9 F | DIASTOLIC BLOOD PRESSURE: 74 MMHG

## 2022-11-09 DIAGNOSIS — D69.6 THROMBOCYTOPENIA (HCC): ICD-10-CM

## 2022-11-09 DIAGNOSIS — Z23 NEED FOR VACCINATION: ICD-10-CM

## 2022-11-09 DIAGNOSIS — E78.2 MIXED HYPERLIPIDEMIA: ICD-10-CM

## 2022-11-09 DIAGNOSIS — N18.30 STAGE 3 CHRONIC KIDNEY DISEASE, UNSPECIFIED WHETHER STAGE 3A OR 3B CKD (HCC): ICD-10-CM

## 2022-11-09 DIAGNOSIS — C67.9 MALIGNANT NEOPLASM OF URINARY BLADDER, UNSPECIFIED SITE (HCC): ICD-10-CM

## 2022-11-09 DIAGNOSIS — Z13.6 SCREENING FOR CARDIOVASCULAR CONDITION: ICD-10-CM

## 2022-11-09 DIAGNOSIS — M85.80 OSTEOPENIA, UNSPECIFIED LOCATION: ICD-10-CM

## 2022-11-09 DIAGNOSIS — D44.3 NEOPLASM OF UNCERTAIN BEHAVIOR OF PITUITARY GLAND (HCC): ICD-10-CM

## 2022-11-09 DIAGNOSIS — Z00.00 MEDICARE ANNUAL WELLNESS VISIT, SUBSEQUENT: Primary | ICD-10-CM

## 2022-11-09 NOTE — PATIENT INSTRUCTIONS
Medicare Preventive Visit Patient Instructions  Thank you for completing your Welcome to Medicare Visit or Medicare Annual Wellness Visit today  Your next wellness visit will be due in one year (11/10/2023)  The screening/preventive services that you may require over the next 5-10 years are detailed below  Some tests may not apply to you based off risk factors and/or age  Screening tests ordered at today's visit but not completed yet may show as past due  Also, please note that scanned in results may not display below  Preventive Screenings:  Service Recommendations Previous Testing/Comments   Colorectal Cancer Screening  · Colonoscopy    · Fecal Occult Blood Test (FOBT)/Fecal Immunochemical Test (FIT)  · Fecal DNA/Cologuard Test  · Flexible Sigmoidoscopy Age: 39-70 years old   Colonoscopy: every 10 years (May be performed more frequently if at higher risk)  OR  FOBT/FIT: every 1 year  OR  Cologuard: every 3 years  OR  Sigmoidoscopy: every 5 years  Screening may be recommended earlier than age 39 if at higher risk for colorectal cancer  Also, an individualized decision between you and your healthcare provider will decide whether screening between the ages of 74-80 would be appropriate   Colonoscopy: Not on file  FOBT/FIT: Not on file  Cologuard: Not on file  Sigmoidoscopy: Not on file          Prostate Cancer Screening Individualized decision between patient and health care provider in men between ages of 53-78   Medicare will cover every 12 months beginning on the day after your 50th birthday PSA: 0 3 ng/mL     Screening Not Indicated     Hepatitis C Screening Once for adults born between [facility]  More frequently in patients at high risk for Hepatitis C Hep C Antibody: Not on file        Diabetes Screening 1-2 times per year if you're at risk for diabetes or have pre-diabetes Fasting glucose: No results in last 5 years (No results in last 5 years)  A1C: No results in last 5 years (No results in last 5 years)  Screening Current   Cholesterol Screening Once every 5 years if you don't have a lipid disorder  May order more often based on risk factors  Lipid panel: 10/09/2021  Screening Not Indicated  History Lipid Disorder      Other Preventive Screenings Covered by Medicare:  1  Abdominal Aortic Aneurysm (AAA) Screening: covered once if your at risk  You're considered to be at risk if you have a family history of AAA or a male between the age of 73-68 who smoking at least 100 cigarettes in your lifetime  2  Lung Cancer Screening: covers low dose CT scan once per year if you meet all of the following conditions: (1) Age 50-69; (2) No signs or symptoms of lung cancer; (3) Current smoker or have quit smoking within the last 15 years; (4) You have a tobacco smoking history of at least 20 pack years (packs per day x number of years you smoked); (5) You get a written order from a healthcare provider  3  Glaucoma Screening: covered annually if you're considered high risk: (1) You have diabetes OR (2) Family history of glaucoma OR (3)  aged 48 and older OR (3)  American aged 72 and older  3  Osteoporosis Screening: covered every 2 years if you meet one of the following conditions: (1) Have a vertebral abnormality; (2) On glucocorticoid therapy for more than 3 months; (3) Have primary hyperparathyroidism; (4) On osteoporosis medications and need to assess response to drug therapy  5  HIV Screening: covered annually if you're between the age of 12-76  Also covered annually if you are younger than 13 and older than 72 with risk factors for HIV infection  For pregnant patients, it is covered up to 3 times per pregnancy      Immunizations:  Immunization Recommendations   Influenza Vaccine Annual influenza vaccination during flu season is recommended for all persons aged >= 6 months who do not have contraindications   Pneumococcal Vaccine   * Pneumococcal conjugate vaccine = PCV13 (Prevnar 13), PCV15 (Vaxneuvance), PCV20 (Prevnar 20)  * Pneumococcal polysaccharide vaccine = PPSV23 (Pneumovax) Adults 2364 years old: 1-3 doses may be recommended based on certain risk factors  Adults 72 years old: 1-2 doses may be recommended based off what pneumonia vaccine you previously received   Hepatitis B Vaccine 3 dose series if at intermediate or high risk (ex: diabetes, end stage renal disease, liver disease)   Tetanus (Td) Vaccine - COST NOT COVERED BY MEDICARE PART B Following completion of primary series, a booster dose should be given every 10 years to maintain immunity against tetanus  Td may also be given as tetanus wound prophylaxis  Tdap Vaccine - COST NOT COVERED BY MEDICARE PART B Recommended at least once for all adults  For pregnant patients, recommended with each pregnancy  Shingles Vaccine (Shingrix) - COST NOT COVERED BY MEDICARE PART B  2 shot series recommended in those aged 48 and above     Health Maintenance Due:  There are no preventive care reminders to display for this patient  Immunizations Due:      Topic Date Due   • COVID-19 Vaccine (4 - Booster for Moderna series) 04/27/2022   • Influenza Vaccine (1) 09/01/2022     Advance Directives   What are advance directives? Advance directives are legal documents that state your wishes and plans for medical care  These plans are made ahead of time in case you lose your ability to make decisions for yourself  Advance directives can apply to any medical decision, such as the treatments you want, and if you want to donate organs  What are the types of advance directives? There are many types of advance directives, and each state has rules about how to use them  You may choose a combination of any of the following:  · Living will: This is a written record of the treatment you want  You can also choose which treatments you do not want, which to limit, and which to stop at a certain time  This includes surgery, medicine, IV fluid, and tube feedings  · Durable power of  for healthcare [place] [facility]): This is a written record that states who you want to make healthcare choices for you when you are unable to make them for yourself  This person, called a proxy, is usually a family member or a friend  You may choose more than 1 proxy  · Do not resuscitate (DNR) order:  A DNR order is used in case your heart stops beating or you stop breathing  It is a request not to have certain forms of treatment, such as CPR  A DNR order may be included in other types of advance directives  · Medical directive: This covers the care that you want if you are in a coma, near death, or unable to make decisions for yourself  You can list the treatments you want for each condition  Treatment may include pain medicine, surgery, blood transfusions, dialysis, IV or tube feedings, and a ventilator (breathing machine)  · Values history: This document has questions about your views, beliefs, and how you feel and think about life  This information can help others choose the care that you would choose  Why are advance directives important? An advance directive helps you control your care  Although spoken wishes may be used, it is better to have your wishes written down  Spoken wishes can be misunderstood, or not followed  Treatments may be given even if you do not want them  An advance directive may make it easier for your family to make difficult choices about your care  © Copyright [employer] 2018 Information is for End User's use only and may not be sold, redistributed or otherwise used for commercial purposes   All illustrations and images included in CareNotes® are the copyrighted property of A D A M , Inc  or [address] [place] [address] [employer]

## 2022-11-09 NOTE — PROGRESS NOTES
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[facility] [address] )       Musculoskeletal and Integument    Osteopenia     Advised on 1200 of calcium  And 1000 of vitamin D            Hematopoietic and Hemostatic    Thrombocytopenia ([facility] [address] )    Relevant Orders    CBC       Genitourinary    Bladder cancer ([facility] [address] )    Relevant Orders    Ambulatory Referral to Urology    Kidney disease, chronic, stage III (GFR 30-59 ml/min) (HCC)       Other    Mixed hyperlipidemia    Relevant Orders    Comprehensive metabolic panel    Lipid Panel with Direct LDL reflex      Other Visit Diagnoses     Medicare annual wellness visit, subsequent    -  Primary    Need for vaccination        Relevant Orders    influenza vaccine, high-dose, PF 0 7 mL (FLUZONE HIGH-DOSE) (Completed)    Screening for cardiovascular condition               Preventive health issues were discussed with patient, and age appropriate screening tests were ordered as noted in patient's After Visit Summary  Personalized health advice and appropriate referrals for health education or preventive services given if needed, as noted in patient's After Visit Summary  History of Present Illness:     Patient presents for a Medicare Wellness Visit    Pt is sched for an AWV  Had labs  Pt has a cologuyard at home to do    Pt was seeing Dr Francesco Sicard - he retired  Francesco Sicard office gave a name of [place]     Patient Care Team:  Junie Vazquez DO as PCP - General (Family Medicine)  Junie Vazquez DO as PCP - [address] [place], [place] [id number] [facility]  Genoveva Allan MD as Consulting Physician (Neurology)  [facility] as Consulting Physician (Ophthalmology)     Review of Systems:     Review of Systems   Constitutional: Negative for activity change, appetite change, chills, diaphoresis, fatigue, fever and unexpected weight change     HENT: Negative for congestion, dental problem, ear pain, mouth sores, sinus pressure, sinus pain, sore throat and trouble swallowing  Eyes: Negative for photophobia, discharge and itching  Respiratory: Negative for apnea, chest tightness and shortness of breath  Cardiovascular: Negative for chest pain, palpitations and leg swelling  Gastrointestinal: Negative for abdominal distention, abdominal pain, blood in stool, nausea and vomiting  Endocrine: Negative for cold intolerance, heat intolerance, polydipsia, polyphagia and polyuria  Genitourinary: Negative for difficulty urinating  Musculoskeletal: Negative for arthralgias  Skin: Negative for color change and wound  Neurological: Negative for dizziness, syncope, speech difficulty and headaches  Hematological: Negative for adenopathy  Psychiatric/Behavioral: Negative for agitation and behavioral problems          Problem List:     Patient Active Problem List   Diagnosis   • Bladder cancer ([facility] [address] )   • Kidney disease, chronic, stage III (GFR 30-59 ml/min) ([facility])   • Mixed hyperlipidemia   • 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)   • Thrombocytopenia (HCC)   • Mild cognitive disorder   • Rheumatic mitral regurgitation   • 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[facility])   • Osteopenia      Past Medical and Surgical History:     Past Medical History:   Diagnosis Date   • Benign neoplasm of pituitary gland (HCC)    • Organic impotence    • Pneumonia     of left lower lobe due to infectious organism, last assessed 3/22/16, resolved 12/6/16   • Transient cerebral ischemia      Past Surgical History:   Procedure Laterality Date   • BRAIN SURGERY        Family History:     Family History   Problem Relation Age of Onset   • No Known Problems Mother    • No Known Problems Father    • Mental illness Neg Hx       Social History:     Social History     Socioeconomic History   • Marital status: /Civil Union     Spouse name: None   • Number of children: None   • Years of education: None   • Highest education level: None   Occupational History   • None   Tobacco Use   • Smoking status: Former Smoker     Types: Cigarettes   • Smokeless tobacco: Never Used   Vaping Use   • Vaping Use: Never used   Substance and Sexual Activity   • Alcohol use: Yes     Comment: rare   • Drug use: Never   • Sexual activity: None   Other Topics Concern   • None   Social History Narrative   • None     Social Determinants of Health     Financial Resource Strain: Low Risk    • Difficulty of Paying Living Expenses: Not hard at all   Food Insecurity: Not on file   Transportation Needs: No Transportation Needs   • Lack of Transportation (Medical): No   • Lack of Transportation (Non-Medical): No   Physical Activity: Not on file   Stress: Not on file   Social Connections: Not on file   Intimate Partner Violence: Not on file   Housing Stability: Not on file      Medications and Allergies:     Current Outpatient Medications   Medication Sig Dispense Refill   • Bacillus Coagulans-Inulin (Probiotic) 1-250 BILLION-MG CAPS      • Bioflavonoid Products (SYMONE-C/BIOFLAVONOIDS PO)      • DHEA 25 MG CAPS      • GINKGO BILOBA COMPLEX PO      • Magnesium Gluconate 550 MG TABS      • Multiple Vitamins-Minerals (ZINC PO)      • Omega-3 Fatty Acids (OMEGA 3 PO)        No current facility-administered medications for this visit       No Known Allergies   Immunizations:     Immunization History   Administered Date(s) Administered   • COVID-19 MODERNA VACC 0 25 ML IM BOOSTER 12/27/2021   • COVID-19 MODERNA VACC 0 5 ML IM 02/17/2021, 03/17/2021   • Influenza Split High Dose Preservative Free IM 10/11/2012, 09/27/2013, 10/13/2014, 10/15/2015, 10/11/2016, 09/21/2017   • Influenza, high dose seasonal 0 7 mL 09/25/2018, 10/09/2019, 10/14/2020, 10/27/2021, 11/09/2022   • Influenza, seasonal, injectable 10/28/2010, 11/02/2011   • Pneumococcal Conjugate 13-Valent 01/09/2017   • Pneumococcal Polysaccharide PPV23 09/27/2013   • Td (adult), adsorbed 09/16/1997 Health Maintenance: There are no preventive care reminders to display for this patient  Topic Date Due   • COVID-19 Vaccine (4 - Booster for Moderna series) 04/27/2022      Medicare Screening Tests and Risk Assessments:     Ashley De La Garza is here for his Subsequent Wellness visit  Last Medicare Wellness visit information reviewed, patient interviewed, no change since last AWV  Health Risk Assessment:   Patient rates overall health as very good  Patient feels that their physical health rating is same  Patient is satisfied with their life  Eyesight was rated as same  Hearing was rated as same  Patient feels that their emotional and mental health rating is same  Patients states they are sometimes angry  Patient states they are sometimes unusually tired/fatigued  Pain experienced in the last 7 days has been none  Patient states that he has experienced no weight loss or gain in last 6 months  Depression Screening:   PHQ-2 Score: 0      Fall Risk Screening: In the past year, patient has experienced: no history of falling in past year      Home Safety:  Patient does not have trouble with stairs inside or outside of their home  Patient has working smoke alarms and has working carbon monoxide detector  Home safety hazards include: none  Nutrition:   Current diet is Limited junk food and Regular  Medications:   Patient is currently taking over-the-counter supplements  OTC medications include: see medication list  Patient is not able to manage medications  Activities of Daily Living (ADLs)/Instrumental Activities of Daily Living (IADLs):   Walk and transfer into and out of bed and chair?: Yes  Dress and groom yourself?: Yes    Bathe or shower yourself?: Yes    Feed yourself?  Yes  Do your laundry/housekeeping?: Yes  Manage your money, pay your bills and track your expenses?: Yes  Make your own meals?: Yes    Do your own shopping?: Yes    Previous Hospitalizations:   Any hospitalizations or ED visits within the last 12 months?: No      Advance Care Planning:   Living will: Yes    Advanced directive: Yes      Cognitive Screening:   Provider or family/friend/caregiver concerned regarding cognition?: No    PREVENTIVE SCREENINGS      Cardiovascular Screening:    General: Screening Not Indicated, History Lipid Disorder and Risks and Benefits Discussed    Due for: Lipid Panel      Diabetes Screening:     General: Screening Current and Risks and Benefits Discussed    Due for: Blood Glucose      Colorectal Cancer Screening:     General: Risks and Benefits Discussed    Due for: Cologuard      Prostate Cancer Screening:    General: Screening Not Indicated and Risks and Benefits Discussed    Due for: PSA      Osteoporosis Screening:    General: Risks and Benefits Discussed and Screening Current      Abdominal Aortic Aneurysm (AAA) Screening:    Risk factors include: tobacco use        General: Risks and Benefits Discussed and Patient Declines      Lung Cancer Screening:     General: Screening Not Indicated      Hepatitis C Screening:    General: Risks and Benefits Discussed and Patient Declines    Screening, Brief Intervention, and Referral to Treatment (SBIRT)    Screening  Typical number of drinks in a day: 0  Typical number of drinks in a week: 0  Interpretation: Low risk drinking behavior      AUDIT-C Screenin) How often did you have a drink containing alcohol in the past year? never  2) How many drinks did you have on a typical day when you were drinking in the past year? 0  3) How often did you have 6 or more drinks on one occasion in the past year? never    AUDIT-C Score: 0  Interpretation: Score 0-3 (male): Negative screen for alcohol misuse    Single Item Drug Screening:  How often have you used an illegal drug (including marijuana) or a prescription medication for non-medical reasons in the past year? never    Single Item Drug Screen Score: 0  Interpretation: Negative screen for possible drug use disorder    Brief Intervention  Alcohol & drug use screenings were reviewed  No concerns regarding substance use disorder identified  No exam data present     Physical Exam:     /74   Pulse 60   Temp 97 9 °F (36 6 °C)   Resp 16   Ht 5' 4 5" (1 638 m)   Wt 66 2 kg (146 lb)   SpO2 100%   BMI 24 67 kg/m²     Physical Exam  Constitutional:       Appearance: He is well-developed  HENT:      Head: Normocephalic and atraumatic  Right Ear: External ear normal       Left Ear: External ear normal       Nose: Nose normal    Eyes:      Conjunctiva/sclera: Conjunctivae normal       Pupils: Pupils are equal, round, and reactive to light  Neck:      Thyroid: No thyromegaly  Cardiovascular:      Rate and Rhythm: Normal rate and regular rhythm  Heart sounds: Normal heart sounds  Pulmonary:      Effort: Pulmonary effort is normal       Breath sounds: Normal breath sounds  No wheezing  Abdominal:      General: Bowel sounds are normal  There is no distension  Palpations: Abdomen is soft  There is no mass  Tenderness: There is no abdominal tenderness  There is no guarding  Musculoskeletal:         General: No tenderness  Normal range of motion  Cervical back: Normal range of motion and neck supple  Skin:     General: Skin is warm and dry  Capillary Refill: Capillary refill takes less than 2 seconds  Findings: No erythema  Neurological:      Mental Status: He is alert and oriented to person, place, and time  Psychiatric:         Behavior: Behavior normal          Thought Content:  Thought content normal          Judgment: Judgment normal           Recent Results (from the past 504 hour(s))   Comprehensive metabolic panel    Collection Time: 10/29/22  8:47 AM   Result Value Ref Range    Glucose, Random 91 70 - 99 mg/dL    BUN 43 (H) 8 - 27 mg/dL    Creatinine 1 48 (H) 0 76 - 1 27 mg/dL    eGFR 47 (L) >59 mL/min/1 73    SL AMB BUN/CREATININE RATIO 29 (H) 10 - 24    Sodium 143 134 - 144 mmol/L    Potassium 4 4 3 5 - 5 2 mmol/L    Chloride 105 96 - 106 mmol/L    CO2 23 20 - 29 mmol/L    CALCIUM 9 4 8 6 - 10 2 mg/dL    Protein, Total 7 2 6 0 - 8 5 g/dL    Albumin 4 3 3 6 - 4 6 g/dL    Globulin, Total 2 9 1 5 - 4 5 g/dL    Albumin/Globulin Ratio 1 5 1 2 - 2 2    TOTAL BILIRUBIN 0 5 0 0 - 1 2 mg/dL    Alk Phos Isoenzymes 40 (L) 44 - 121 IU/L    AST 28 0 - 40 IU/L    ALT 19 0 - 44 IU/L   [facility] Kidney Stone Panel    Collection Time: 10/29/22  8:47 AM   Result Value Ref Range    Interpretation Note    PSA Total, Diagnostic    Collection Time: 10/29/22  8:47 AM   Result Value Ref Range    Prostate Specific Antigen Total 0 3 0 0 - 4 0 ng/mL         Suresh Acevedo, DO

## 2022-11-28 LAB — COLOGUARD RESULT REPORTABLE: NEGATIVE

## 2023-05-09 ENCOUNTER — OFFICE VISIT (OUTPATIENT)
Dept: FAMILY MEDICINE CLINIC | Facility: CLINIC | Age: 83
End: 2023-05-09

## 2023-05-09 ENCOUNTER — TELEPHONE (OUTPATIENT)
Dept: UROLOGY | Facility: AMBULATORY SURGERY CENTER | Age: 83
End: 2023-05-09

## 2023-05-09 VITALS
SYSTOLIC BLOOD PRESSURE: 118 MMHG | HEIGHT: 65 IN | RESPIRATION RATE: 16 BRPM | BODY MASS INDEX: 25.16 KG/M2 | OXYGEN SATURATION: 98 % | TEMPERATURE: 96.7 F | WEIGHT: 151 LBS | HEART RATE: 74 BPM | DIASTOLIC BLOOD PRESSURE: 60 MMHG

## 2023-05-09 DIAGNOSIS — Z12.5 SCREENING FOR PROSTATE CANCER: ICD-10-CM

## 2023-05-09 DIAGNOSIS — D69.6 THROMBOCYTOPENIA (HCC): ICD-10-CM

## 2023-05-09 DIAGNOSIS — N18.30 STAGE 3 CHRONIC KIDNEY DISEASE, UNSPECIFIED WHETHER STAGE 3A OR 3B CKD (HCC): Primary | ICD-10-CM

## 2023-05-09 DIAGNOSIS — F09 MILD COGNITIVE DISORDER: ICD-10-CM

## 2023-05-09 DIAGNOSIS — E78.2 MIXED HYPERLIPIDEMIA: ICD-10-CM

## 2023-05-09 DIAGNOSIS — C67.9 MALIGNANT NEOPLASM OF URINARY BLADDER, UNSPECIFIED SITE (HCC): ICD-10-CM

## 2023-05-09 DIAGNOSIS — I35.0 MILD AORTIC STENOSIS: ICD-10-CM

## 2023-05-09 DIAGNOSIS — I05.1 RHEUMATIC MITRAL REGURGITATION: ICD-10-CM

## 2023-05-09 DIAGNOSIS — Z13.6 SCREENING FOR CARDIOVASCULAR CONDITION: ICD-10-CM

## 2023-05-09 NOTE — PROGRESS NOTES
Assessment/Plan:    1  Stage 3 chronic kidney disease, unspecified whether stage 3a or 3b CKD (HCC)  -     CBC; Future; Expected date: 10/21/2023  -     Comprehensive metabolic panel; Future; Expected date: 10/21/2023  -     Lipid Panel with Direct LDL reflex; Future; Expected date: 10/21/2023    2  Mixed hyperlipidemia  -     CBC; Future; Expected date: 10/21/2023  -     Comprehensive metabolic panel; Future; Expected date: 10/21/2023  -     Lipid Panel with Direct LDL reflex; Future; Expected date: 10/21/2023    3  Thrombocytopenia ([address] )  -     CBC; Future; Expected date: 10/21/2023  -     Comprehensive metabolic panel; Future; Expected date: 10/21/2023  -     Lipid Panel with Direct LDL reflex; Future; Expected date: 10/21/2023    4  BMI 25 0-25 9,adult    5  Mild cognitive disorder  -     CBC; Future; Expected date: 10/21/2023  -     Comprehensive metabolic panel; Future; Expected date: 10/21/2023  -     Lipid Panel with Direct LDL reflex; Future; Expected date: 10/21/2023    6  Mild aortic stenosis  -     Echo complete w/ contrast if indicated; Future; Expected date: 05/09/2023    7  Rheumatic mitral regurgitation  -     Echo complete w/ contrast if indicated; Future; Expected date: 05/09/2023    8  Malignant neoplasm of urinary bladder, unspecified site [facility])  -     Ambulatory Referral to Urology; Future    9  Screening for prostate cancer  -     PSA, Total Screen; Future    10  Screening for cardiovascular condition          Patient Instructions     Weight Management   AMBULATORY CARE:   Why it is important to manage your weight:  Being overweight increases your risk of health conditions such as heart disease, high blood pressure, type 2 diabetes, and certain types of cancer  It can also increase your risk for osteoarthritis, sleep apnea, and other respiratory problems  Aim for a slow, steady weight loss  Even a small amount of weight loss can lower your risk of health problems    Risks of being overweight: Extra weight can cause many health problems, including the following:  Diabetes (high blood sugar level)    High blood pressure or high cholesterol    Heart disease    Stroke    Gallbladder or liver disease    Cancer of the colon, breast, prostate, liver, or kidney    Sleep apnea    Arthritis or gout    Screening  is done to check for health conditions before you have signs or symptoms  If you are 28to 79years old, your blood sugar level may be checked every 3 years for signs of prediabetes or diabetes  Your healthcare provider will check your blood pressure at each visit  High blood pressure can lead to a stroke or other problems  Your provider may check for signs of heart disease, cancer, or other health problems  How to lose weight safely:  A safe and healthy way to lose weight is to eat fewer calories and get regular exercise  You can lose up about 1 pound a week by decreasing the number of calories you eat by 500 calories each day  You can decrease calories by eating smaller portion sizes or by cutting out high-calorie foods  Read labels to find out how many calories are in the foods you eat  You can also burn calories with exercise such as walking, swimming, or biking  You will be more likely to keep weight off if you make these changes part of your lifestyle  Exercise at least 30 minutes per day on most days of the week  You can also fit in more physical activity by taking the stairs instead of the elevator or parking farther away from stores  Ask your healthcare provider about the best exercise plan for you  Healthy meal plan for weight management:  A healthy meal plan includes a variety of foods, contains fewer calories, and helps you stay healthy  A healthy meal plan includes the following:     Eat whole-grain foods more often  A healthy meal plan should contain fiber  Fiber is the part of grains, fruits, and vegetables that is not broken down by your body   Whole-grain foods are healthy and provide extra fiber in your diet  Some examples of whole-grain foods are whole-wheat breads and pastas, oatmeal, brown rice, and bulgur  Eat a variety of vegetables every day  Include dark, leafy greens such as spinach, kale, awilda greens, and mustard greens  Eat yellow and orange vegetables such as carrots, sweet potatoes, and winter squash  Eat a variety of fruits every day  Choose fresh or canned fruit (canned in its own juice or light syrup) instead of juice  Fruit juice has very little or no fiber  Eat low-fat dairy foods  Drink fat-free (skim) milk or 1% milk  Eat fat-free yogurt and low-fat cottage cheese  Try low-fat cheeses such as mozzarella and other reduced-fat cheeses  Choose meat and other protein foods that are low in fat  Choose beans or other legumes such as split peas or lentils  Choose fish, skinless poultry (chicken or turkey), or lean cuts of red meat (beef or pork)  Before you cook meat or poultry, cut off any visible fat  Use less fat and oil  Try baking foods instead of frying them  Add less fat, such as margarine, sour cream, regular salad dressing and mayonnaise to foods  Eat fewer high-fat foods  Some examples of high-fat foods include french fries, doughnuts, ice cream, and cakes  Eat fewer sweets  Limit foods and drinks that are high in sugar  This includes candy, cookies, regular soda, and sweetened drinks  Ways to decrease calories:   Eat smaller portions  Use a small plate with smaller servings  Do not eat second helpings  When you eat at a restaurant, ask for a box and place half of your meal in the box before you eat  Share an entrée with someone else  Replace high-calorie snacks with healthy, low-calorie snacks  Choose fresh fruit, vegetables, fat-free rice cakes, or air-popped popcorn instead of potato chips, nuts, or chocolate  Choose water or calorie-free drinks instead of soda or sweetened drinks      Do not shop for groceries when you are hungry  You may be more likely to make unhealthy food choices  Take a grocery list of healthy foods and shop after you have eaten  Eat regular meals  Do not skip meals  Skipping meals can lead to overeating later in the day  This can make it harder for you to lose weight  Eat a healthy snack in place of a meal if you do not have time to eat a regular meal  Talk with a dietitian to help you create a meal plan and schedule that is right for you  Other things to consider as you try to lose weight:   Be aware of situations that may give you the urge to overeat, such as eating while watching television  Find ways to avoid these situations  For example, read a book, go for a walk, or do crafts  Meet with a weight loss support group or friends who are also trying to lose weight  This may help you stay motivated to continue working on your weight loss goals  © Copyright [employer]in[employer] 2022 Information is for End User's use only and may not be sold, redistributed or otherwise used for commercial purposes  The above information is an  only  It is not intended as medical advice for individual conditions or treatments  Talk to your doctor, nurse or pharmacist before following any medical regimen to see if it is safe and effective for you  No follow-ups on file  Subjective:      Patient ID: Basia Loya is a 79 y o  male  Chief Complaint   Patient presents with   • Follow-up     On meds  Shahnaz Hauser MA         Pt is here for a 6 month follow up  PT did not do labs    Pt has no new queastions  Pt feels memory has been ok, wife reports he has been forgetting and it seems to be getting worse  H e is taking vitamins for that including prevagen  Pt has not seen urology since dr Josué Santizo retired  They diod not want to go to [facility]    They would like a name      The following portions of the patient's history were reviewed and updated as appropriate: allergies, current "medications, past family history, past medical history, past social history, past surgical history and problem list     Review of Systems   Constitutional: Negative for activity change, appetite change, chills, diaphoresis, fatigue, fever and unexpected weight change  HENT: Negative for congestion, dental problem, ear pain, mouth sores, sinus pressure, sinus pain, sore throat and trouble swallowing  Eyes: Negative for photophobia, discharge and itching  Respiratory: Negative for apnea, chest tightness and shortness of breath  Cardiovascular: Negative for chest pain, palpitations and leg swelling  Gastrointestinal: Negative for abdominal distention, abdominal pain, blood in stool, nausea and vomiting  Endocrine: Negative for cold intolerance, heat intolerance, polydipsia, polyphagia and polyuria  Genitourinary: Negative for difficulty urinating  Musculoskeletal: Negative for arthralgias  Skin: Negative for color change and wound  Neurological: Negative for dizziness, syncope, speech difficulty and headaches  Hematological: Negative for adenopathy  Psychiatric/Behavioral: Positive for decreased concentration  Negative for agitation and behavioral problems  Current Outpatient Medications   Medication Sig Dispense Refill   • Bacillus Coagulans-Inulin (Probiotic) 1-250 BILLION-MG CAPS      • Bioflavonoid Products (SYMONE-C/BIOFLAVONOIDS PO)      • DHEA 25 MG CAPS      • GINKGO BILOBA COMPLEX PO      • Magnesium Gluconate 550 MG TABS      • Multiple Vitamins-Minerals (ZINC PO)      • Omega-3 Fatty Acids (OMEGA 3 PO)        No current facility-administered medications for this visit  No results found for this or any previous visit (from the past 672 hour(s))  Objective:    /60   Pulse 74   Temp (!) 96 7 °F (35 9 °C)   Resp 16   Ht 5' 4 5\" (1 638 m)   Wt 68 5 kg (151 lb)   SpO2 98%   BMI 25 52 kg/m²        Physical Exam  Vitals and nursing note reviewed   " Constitutional:       General: He is not in acute distress  Appearance: He is well-developed  He is not diaphoretic  HENT:      Head: Normocephalic and atraumatic  Right Ear: External ear normal       Left Ear: External ear normal       Nose: Nose normal       Mouth/Throat:      Pharynx: No oropharyngeal exudate  Eyes:      General: No scleral icterus  Right eye: No discharge  Left eye: No discharge  Pupils: Pupils are equal, round, and reactive to light  Neck:      Thyroid: No thyromegaly  Cardiovascular:      Rate and Rhythm: Normal rate  Heart sounds: Normal heart sounds  No murmur heard  Pulmonary:      Effort: Pulmonary effort is normal  No respiratory distress  Breath sounds: Normal breath sounds  No wheezing  Abdominal:      General: Bowel sounds are normal  There is no distension  Palpations: Abdomen is soft  There is no mass  Tenderness: There is no abdominal tenderness  There is no guarding or rebound  Musculoskeletal:         General: Normal range of motion  Skin:     General: Skin is warm and dry  Findings: No erythema or rash  Neurological:      Mental Status: He is alert  Coordination: Coordination normal       Deep Tendon Reflexes: Reflexes normal    Psychiatric:         Behavior: Behavior normal                 Patrice Ramos DO  BMI Counseling: Body mass index is 25 52 kg/m²  The BMI is above normal  Nutrition recommendations include reducing portion sizes

## 2023-05-09 NOTE — PATIENT INSTRUCTIONS
Weight Management   AMBULATORY CARE:   Why it is important to manage your weight:  Being overweight increases your risk of health conditions such as heart disease, high blood pressure, type 2 diabetes, and certain types of cancer  It can also increase your risk for osteoarthritis, sleep apnea, and other respiratory problems  Aim for a slow, steady weight loss  Even a small amount of weight loss can lower your risk of health problems  Risks of being overweight:  Extra weight can cause many health problems, including the following:  • Diabetes (high blood sugar level)    • High blood pressure or high cholesterol    • Heart disease    • Stroke    • Gallbladder or liver disease    • Cancer of the colon, breast, prostate, liver, or kidney    • Sleep apnea    • Arthritis or gout    Screening  is done to check for health conditions before you have signs or symptoms  If you are 28to 79years old, your blood sugar level may be checked every 3 years for signs of prediabetes or diabetes  Your healthcare provider will check your blood pressure at each visit  High blood pressure can lead to a stroke or other problems  Your provider may check for signs of heart disease, cancer, or other health problems  How to lose weight safely:  A safe and healthy way to lose weight is to eat fewer calories and get regular exercise  • You can lose up about 1 pound a week by decreasing the number of calories you eat by 500 calories each day  You can decrease calories by eating smaller portion sizes or by cutting out high-calorie foods  Read labels to find out how many calories are in the foods you eat  • You can also burn calories with exercise such as walking, swimming, or biking  You will be more likely to keep weight off if you make these changes part of your lifestyle  Exercise at least 30 minutes per day on most days of the week   You can also fit in more physical activity by taking the stairs instead of the elevator or parking farther away from stores  Ask your healthcare provider about the best exercise plan for you  Healthy meal plan for weight management:  A healthy meal plan includes a variety of foods, contains fewer calories, and helps you stay healthy  A healthy meal plan includes the following:     • Eat whole-grain foods more often  A healthy meal plan should contain fiber  Fiber is the part of grains, fruits, and vegetables that is not broken down by your body  Whole-grain foods are healthy and provide extra fiber in your diet  Some examples of whole-grain foods are whole-wheat breads and pastas, oatmeal, brown rice, and bulgur  • Eat a variety of vegetables every day  Include dark, leafy greens such as spinach, kale, awilda greens, and mustard greens  Eat yellow and orange vegetables such as carrots, sweet potatoes, and winter squash  • Eat a variety of fruits every day  Choose fresh or canned fruit (canned in its own juice or light syrup) instead of juice  Fruit juice has very little or no fiber  • Eat low-fat dairy foods  Drink fat-free (skim) milk or 1% milk  Eat fat-free yogurt and low-fat cottage cheese  Try low-fat cheeses such as mozzarella and other reduced-fat cheeses  • Choose meat and other protein foods that are low in fat  Choose beans or other legumes such as split peas or lentils  Choose fish, skinless poultry (chicken or turkey), or lean cuts of red meat (beef or pork)  Before you cook meat or poultry, cut off any visible fat  • Use less fat and oil  Try baking foods instead of frying them  Add less fat, such as margarine, sour cream, regular salad dressing and mayonnaise to foods  Eat fewer high-fat foods  Some examples of high-fat foods include french fries, doughnuts, ice cream, and cakes  • Eat fewer sweets  Limit foods and drinks that are high in sugar  This includes candy, cookies, regular soda, and sweetened drinks  Ways to decrease calories:   • Eat smaller portions  ? Use a small plate with smaller servings  ? Do not eat second helpings  ? When you eat at a restaurant, ask for a box and place half of your meal in the box before you eat  ? Share an entrée with someone else  • Replace high-calorie snacks with healthy, low-calorie snacks  ? Choose fresh fruit, vegetables, fat-free rice cakes, or air-popped popcorn instead of potato chips, nuts, or chocolate  ? Choose water or calorie-free drinks instead of soda or sweetened drinks  • Do not shop for groceries when you are hungry  You may be more likely to make unhealthy food choices  Take a grocery list of healthy foods and shop after you have eaten  • Eat regular meals  Do not skip meals  Skipping meals can lead to overeating later in the day  This can make it harder for you to lose weight  Eat a healthy snack in place of a meal if you do not have time to eat a regular meal  Talk with a dietitian to help you create a meal plan and schedule that is right for you  Other things to consider as you try to lose weight:   • Be aware of situations that may give you the urge to overeat, such as eating while watching television  Find ways to avoid these situations  For example, read a book, go for a walk, or do crafts  • Meet with a weight loss support group or friends who are also trying to lose weight  This may help you stay motivated to continue working on your weight loss goals  © Copyright Blanca Tillman 2022 Information is for End User's use only and may not be sold, redistributed or otherwise used for commercial purposes  The above information is an  only  It is not intended as medical advice for individual conditions or treatments  Talk to your doctor, nurse or pharmacist before following any medical regimen to see if it is safe and effective for you

## 2023-05-09 NOTE — TELEPHONE ENCOUNTER
New Patient    What is the reason for the patient’s appointment?: pt was going to Dr Gaby Starr who is retired and he needs a yearly check up     What office location does the patient prefer?: [place]     Does patient have Imaging/Lab Results: yes    Have patient records been requested?: no  If No, are the records showing in Epic: yes      INSURANCE:  Do we accept the patient's insurance or is the patient Self-Pay?: yes    Insurance Provider: medicare  Plan Type/Number:  Member ID#:       HISTORY:   Has the patient had any previous Urologist(s)?: yes    Was the patient seen in the ED?: no    Has the patient had any outside testing done?: no    Does the patient have a personal history of cancer?: yes

## 2023-05-11 LAB
ALBUMIN SERPL-MCNC: 4.3 G/DL (ref 3.6–4.6)
ALBUMIN/GLOB SERPL: 1.7 {RATIO} (ref 1.2–2.2)
ALP SERPL-CCNC: 45 IU/L (ref 44–121)
ALT SERPL-CCNC: 16 IU/L (ref 0–44)
AST SERPL-CCNC: 27 IU/L (ref 0–40)
BASOPHILS # BLD AUTO: 0 X10E3/UL (ref 0–0.2)
BASOPHILS NFR BLD AUTO: 1 %
BILIRUB SERPL-MCNC: 0.7 MG/DL (ref 0–1.2)
BUN SERPL-MCNC: 43 MG/DL (ref 8–27)
BUN/CREAT SERPL: 27 (ref 10–24)
CALCIUM SERPL-MCNC: 9.1 MG/DL (ref 8.6–10.2)
CHLORIDE SERPL-SCNC: 104 MMOL/L (ref 96–106)
CHOLEST SERPL-MCNC: 207 MG/DL (ref 100–199)
CO2 SERPL-SCNC: 23 MMOL/L (ref 20–29)
CREAT SERPL-MCNC: 1.61 MG/DL (ref 0.76–1.27)
EGFR: 42 ML/MIN/1.73
EOSINOPHIL # BLD AUTO: 0.2 X10E3/UL (ref 0–0.4)
EOSINOPHIL NFR BLD AUTO: 3 %
ERYTHROCYTE [DISTWIDTH] IN BLOOD BY AUTOMATED COUNT: 12.3 % (ref 11.6–15.4)
GLOBULIN SER-MCNC: 2.5 G/DL (ref 1.5–4.5)
GLUCOSE SERPL-MCNC: 83 MG/DL (ref 70–99)
HCT VFR BLD AUTO: 36.4 % (ref 37.5–51)
HDLC SERPL-MCNC: 70 MG/DL
HGB BLD-MCNC: 12.1 G/DL (ref 13–17.7)
IMM GRANULOCYTES # BLD: 0 X10E3/UL (ref 0–0.1)
IMM GRANULOCYTES NFR BLD: 0 %
LDLC SERPL CALC-MCNC: 129 MG/DL (ref 0–99)
LYMPHOCYTES # BLD AUTO: 1.1 X10E3/UL (ref 0.7–3.1)
LYMPHOCYTES NFR BLD AUTO: 21 %
MCH RBC QN AUTO: 29.4 PG (ref 26.6–33)
MCHC RBC AUTO-ENTMCNC: 33.2 G/DL (ref 31.5–35.7)
MCV RBC AUTO: 88 FL (ref 79–97)
MICRODELETION SYND BLD/T FISH: NORMAL
MICRODELETION SYND BLD/T FISH: NORMAL
MONOCYTES # BLD AUTO: 0.5 X10E3/UL (ref 0.1–0.9)
MONOCYTES NFR BLD AUTO: 9 %
NEUTROPHILS # BLD AUTO: 3.5 X10E3/UL (ref 1.4–7)
NEUTROPHILS NFR BLD AUTO: 66 %
PLATELET # BLD AUTO: 161 X10E3/UL (ref 150–450)
POTASSIUM SERPL-SCNC: 4.5 MMOL/L (ref 3.5–5.2)
PROT SERPL-MCNC: 6.8 G/DL (ref 6–8.5)
PSA SERPL-MCNC: 0.3 NG/ML (ref 0–4)
RBC # BLD AUTO: 4.12 X10E6/UL (ref 4.14–5.8)
SODIUM SERPL-SCNC: 141 MMOL/L (ref 134–144)
TRIGL SERPL-MCNC: 46 MG/DL (ref 0–149)
WBC # BLD AUTO: 5.3 X10E3/UL (ref 3.4–10.8)

## 2023-05-16 ENCOUNTER — HOSPITAL ENCOUNTER (OUTPATIENT)
Dept: NON INVASIVE DIAGNOSTICS | Facility: HOSPITAL | Age: 83
Discharge: HOME | End: 2023-05-16
Attending: FAMILY MEDICINE

## 2023-05-16 VITALS
HEART RATE: 82 BPM | BODY MASS INDEX: 25.78 KG/M2 | HEIGHT: 64 IN | DIASTOLIC BLOOD PRESSURE: 65 MMHG | WEIGHT: 151 LBS | SYSTOLIC BLOOD PRESSURE: 124 MMHG

## 2023-05-16 DIAGNOSIS — I35.0 MILD AORTIC STENOSIS: ICD-10-CM

## 2023-05-16 DIAGNOSIS — I05.1 RHEUMATIC MITRAL REGURGITATION: ICD-10-CM

## 2023-05-17 ENCOUNTER — OFFICE VISIT (OUTPATIENT)
Dept: UROLOGY | Facility: CLINIC | Age: 83
End: 2023-05-17

## 2023-05-17 VITALS
HEIGHT: 64 IN | HEART RATE: 69 BPM | BODY MASS INDEX: 26.22 KG/M2 | RESPIRATION RATE: 16 BRPM | WEIGHT: 153.6 LBS | OXYGEN SATURATION: 98 % | SYSTOLIC BLOOD PRESSURE: 132 MMHG | DIASTOLIC BLOOD PRESSURE: 72 MMHG

## 2023-05-17 DIAGNOSIS — Z85.51 HX OF BLADDER CANCER: Primary | ICD-10-CM

## 2023-05-17 LAB
AORTIC ROOT: 3.4 CM
AORTIC VALVE MEAN VELOCITY: 17.5 M/S
AV AREA BY CONTINUOUS VTI: 1.3 CM2
AV AREA PEAK VELOCITY: 1.2 CM2
AV LVOT MEAN GRADIENT: 3 MMHG
AV LVOT PEAK GRADIENT: 4 MMHG
AV MEAN GRADIENT: 14 MMHG
AV PEAK GRADIENT: 26 MMHG
AV VALVE AREA: 1.3 CM2
AV VELOCITY RATIO: 0.39
BACTERIA UR QL AUTO: ABNORMAL /HPF
BILIRUB UR QL STRIP: NEGATIVE
CLARITY UR: CLEAR
COLOR UR: YELLOW
DOP CALC AO PEAK VEL: 2.56 M/S
DOP CALC AO VTI: 60.02 CM
DOP CALC LVOT AREA: 3.14 CM2
DOP CALC LVOT DIAMETER: 2 CM
DOP CALC LVOT PEAK VEL VTI: 24.82 CM
DOP CALC LVOT PEAK VEL: 1 M/S
DOP CALC LVOT STROKE INDEX: 43.4 ML/M2
DOP CALC LVOT STROKE VOLUME: 77.93 CM3
E WAVE DECELERATION TIME: 251 MS
FRACTIONAL SHORTENING: 32 % (ref 28–44)
GLUCOSE UR STRIP-MCNC: NEGATIVE MG/DL
HGB UR QL STRIP.AUTO: NEGATIVE
HYALINE CASTS #/AREA URNS LPF: ABNORMAL /LPF
INTERVENTRICULAR SEPTUM IN DIASTOLE (PARASTERNAL SHORT AXIS VIEW): 1.2 CM
INTERVENTRICULAR SEPTUM: 1.2 CM (ref 0.6–1.1)
KETONES UR STRIP-MCNC: NEGATIVE MG/DL
LAAS-AP2: 19.1 CM2
LAAS-AP4: 19.6 CM2
LEFT ATRIUM SIZE: 3.6 CM
LEFT INTERNAL DIMENSION IN SYSTOLE: 3 CM (ref 2.1–4)
LEFT VENTRICULAR INTERNAL DIMENSION IN DIASTOLE: 4.4 CM (ref 3.5–6)
LEFT VENTRICULAR POSTERIOR WALL IN END DIASTOLE: 1.2 CM
LEFT VENTRICULAR STROKE VOLUME: 51 ML
LEUKOCYTE ESTERASE UR QL STRIP: NEGATIVE
LVSV (TEICH): 51 ML
MUCOUS THREADS UR QL AUTO: ABNORMAL
MV E'TISSUE VEL-LAT: 10 CM/S
MV E'TISSUE VEL-SEP: 6 CM/S
MV PEAK A VEL: 0.69 M/S
MV PEAK E VEL: 55 CM/S
MV STENOSIS PRESSURE HALF TIME: 73 MS
MV VALVE AREA P 1/2 METHOD: 3.01 CM2
NITRITE UR QL STRIP: NEGATIVE
NON-SQ EPI CELLS URNS QL MICRO: ABNORMAL /HPF
PH UR STRIP.AUTO: 6 [PH]
POST-VOID RESIDUAL VOLUME, ML POC: 0 ML
PROT UR STRIP-MCNC: ABNORMAL MG/DL
RBC #/AREA URNS AUTO: ABNORMAL /HPF
RIGHT VENTRICLE ID DIMENSION: 3.8 CM
SL AMB  POCT GLUCOSE, UA: NORMAL
SL AMB LEUKOCYTE ESTERASE,UA: NORMAL
SL AMB POCT BILIRUBIN,UA: NORMAL
SL AMB POCT BLOOD,UA: NORMAL
SL AMB POCT CLARITY,UA: CLEAR
SL AMB POCT COLOR,UA: YELLOW
SL AMB POCT KETONES,UA: NORMAL
SL AMB POCT NITRITE,UA: NORMAL
SL AMB POCT PH,UA: 5
SL AMB POCT SPECIFIC GRAVITY,UA: 1015
SL AMB POCT URINE PROTEIN: NORMAL
SL AMB POCT UROBILINOGEN: 0.2
SL CV LEFT ATRIUM LENGTH A2C: 5.2 CM
SL CV LV EF: 59
SL CV PED ECHO LEFT VENTRICLE DIASTOLIC VOLUME (MOD BIPLANE) 2D: 86 ML
SL CV PED ECHO LEFT VENTRICLE SYSTOLIC VOLUME (MOD BIPLANE) 2D: 35 ML
SP GR UR STRIP.AUTO: 1.02 (ref 1–1.03)
TR MAX PG: 14 MMHG
TR PEAK VELOCITY: 1.9 M/S
TRICUSPID ANNULAR PLANE SYSTOLIC EXCURSION: 2.4 CM
TRICUSPID VALVE PEAK REGURGITATION VELOCITY: 1.9 M/S
UROBILINOGEN UR STRIP-ACNC: <2 MG/DL
WBC #/AREA URNS AUTO: ABNORMAL /HPF

## 2023-05-17 NOTE — PROGRESS NOTES
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 a low-grade noninvasive papillary urothelial carcinoma and TURBT  -Subsequent cystoscopies have been negative  Last 1 was performed by Dr Jessica Li in 2021  -Patient with TA low-grade disease based on pathology report at time of initial TURBT in 2015  Based on NCCN guidelines  surveillance cystoscopy is not required after 5 years  Advised that if patient were to experience gross hematuria, change in urinary symptoms including dysuria, frequency, urgency at that point then could consider surveillance cystoscopy  Patient to call office if those signs/symptoms were to occur  -Send out for urine cytology testing  -Patient can follow-up in a year  2   Prostate cancer screening  -Last PSA was 0 3 in October 2022  -Obtained by PCP  -Discussed that we do not usually engage in routine prostate cancer screening in men over the age of 78/78 per AUA guidelines but patient wishes to proceed with prostate cancer screening he can continue with PSA screening with his PCP  Chief Complaint:   Case Diallo is a 79 y o  male presenting to the office for an initial evaluation regarding  To establish care/history of bladder carcinoma        Subjective      Per Dr Elda Holland documentation in 2021  -Bladder cancer  [facility]  7/21/15   -07/21/15 TURBT Low-grade non-invasive papillary urothelial carcinoma  09/16/15 Cystoscopy negative  03/23/16 Cystoscopy Bladder cancer history 9 months ago left lateral wall low grade non-invasive disease  No recurrent disease on cysto today  06/29/16 Cystoscopy  07/26/17 Cystoscopy Negative   Creat 1 3, roughly stable  07/25/18 Cystoscopy Negative  07/31/19 Cystoscopy Negative     -Patient denies episodes of gross hematuria  -No bothersome urinary symptoms including dysuria, frequency, urgency, weak urinary stream, sensation of incomplete bladder emptying  -No history of nephrolithiasis  -No surgeries on kidneys bladder or prostate  -PSA screening through PCP      ROS:   Review of Systems   Constitutional: Negative  Negative for chills, fatigue and fever  HENT: Negative  Eyes: Negative  Respiratory: Negative  Negative for shortness of breath  Cardiovascular: Negative  Negative for chest pain  Gastrointestinal: Negative  Endocrine: Negative  Genitourinary: Negative  Musculoskeletal: Negative  Allergic/Immunologic: Negative  Neurological: Negative  Psychiatric/Behavioral: Negative            Past Medical History  Past Medical History:   Diagnosis Date   • Benign neoplasm of pituitary gland (HCC)    • Organic impotence    • Pneumonia     of left lower lobe due to infectious organism, last assessed 3/22/16, resolved 12/6/16   • Transient cerebral ischemia        Past Surgical History  Past Surgical History:   Procedure Laterality Date   • BRAIN SURGERY         Past Family History  Family History   Problem Relation Age of Onset   • No Known Problems Mother    • No Known Problems Father    • Mental illness Neg Hx        Past Social history  Social History     Socioeconomic History   • Marital status: /Civil Union     Spouse name: Not on file   • Number of children: Not on file   • Years of education: Not on file   • Highest education level: Not on file   Occupational History   • Not on file   Tobacco Use   • Smoking status: Former     Types: Cigarettes   • Smokeless tobacco: Never   Vaping Use   • Vaping Use: Never used   Substance and Sexual Activity   • Alcohol use: Yes     Comment: rare   • Drug use: Never   • Sexual activity: Not on file   Other Topics Concern   • Not on file   Social History Narrative   • Not on file     Social Determinants of Health     Financial Resource Strain: Low Risk    • Difficulty of Paying Living Expenses: Not hard at all   Food Insecurity: Not on "file   Transportation Needs: No Transportation Needs   • Lack of Transportation (Medical): No   • Lack of Transportation (Non-Medical): No   Physical Activity: Not on file   Stress: Not on file   Social Connections: Not on file   Intimate Partner Violence: Not on file   Housing Stability: Not on file       Current Medications  Current Outpatient Medications   Medication Sig Dispense Refill   • Bacillus Coagulans-Inulin (Probiotic) 1-250 BILLION-MG CAPS      • Bioflavonoid Products (SYMONE-C/BIOFLAVONOIDS PO)      • DHEA 25 MG CAPS      • GINKGO BILOBA COMPLEX PO      • Magnesium Gluconate 550 MG TABS      • Multiple Vitamins-Minerals (ZINC PO)      • Omega-3 Fatty Acids (OMEGA 3 PO)        No current facility-administered medications for this visit  Allergies  No Known Allergies    OBJECTIVE    Vitals   Vitals:    05/17/23 1101   BP: 132/72   BP Location: Right arm   Patient Position: Sitting   Cuff Size: Large   Pulse: 69   Resp: 16   SpO2: 98%   Weight: 69 7 kg (153 lb 9 6 oz)   Height: 5' 4\" (1 626 m)       PVR: 0 ml     Physical Exam  Constitutional:       Appearance: Normal appearance  He is normal weight  HENT:      Head: Normocephalic and atraumatic  Right Ear: External ear normal       Left Ear: External ear normal    Eyes:      Conjunctiva/sclera: Conjunctivae normal    Cardiovascular:      Rate and Rhythm: Normal rate  Pulmonary:      Effort: Pulmonary effort is normal  No respiratory distress  Musculoskeletal:         General: Normal range of motion  Cervical back: Normal range of motion and neck supple  Skin:     General: Skin is warm and dry  Neurological:      General: No focal deficit present  Mental Status: He is alert  Cranial Nerves: No cranial nerve deficit  Psychiatric:         Mood and Affect: Mood normal          Behavior: Behavior normal          Thought Content:  Thought content normal           Labs:     Lab Results   Component Value Date    PSA 0 3 " 05/10/2023    PSA 0 3 10/29/2022    PSA 0 3 10/09/2021    PSA 0 3 12/13/2016     Lab Results   Component Value Date    CREATININE 1 61 (H) 05/10/2023      No results found for: HGBA1C  Lab Results   Component Value Date    GLUCOSE 93 07/25/2017    CALCIUM 9 2 07/25/2017     07/25/2017    K 4 5 05/10/2023    CO2 23 05/10/2023     05/10/2023    BUN 43 (H) 05/10/2023    CREATININE 1 61 (H) 05/10/2023       I have personally reviewed all pertinent lab results and reviewed with patient    Imaging       Cindy Pimentel, KAREN  Date: 5/17/2023 Time: [facility]    This note was written using fluency dictation software  Please excuse any resulting minor grammatical errors

## 2023-05-22 PROCEDURE — 88112 CYTOPATH CELL ENHANCE TECH: CPT | Performed by: STUDENT IN AN ORGANIZED HEALTH CARE EDUCATION/TRAINING PROGRAM

## 2023-05-23 ENCOUNTER — TELEPHONE (OUTPATIENT)
Dept: UROLOGY | Facility: CLINIC | Age: 83
End: 2023-05-23

## 2023-05-23 ENCOUNTER — PREP FOR PROCEDURE (OUTPATIENT)
Dept: UROLOGY | Facility: CLINIC | Age: 83
End: 2023-05-23

## 2023-05-23 DIAGNOSIS — Z85.51 HX OF BLADDER CANCER: Primary | ICD-10-CM

## 2023-05-23 NOTE — TELEPHONE ENCOUNTER
Called patient was able to get hold of patient's spouse Jayesh Preston  Informed her that based on patient's pathology report, current NCCN guidelines, and Dr Cortez Covert recommendations cystoscopy/aging is not needed at this time  Patient's spouse is agreeable and states that she will inform patient

## 2023-07-01 ENCOUNTER — TELEPHONE (OUTPATIENT)
Dept: FAMILY MEDICINE CLINIC | Facility: CLINIC | Age: 83
End: 2023-07-01

## 2023-07-01 DIAGNOSIS — N18.30 STAGE 3 CHRONIC KIDNEY DISEASE, UNSPECIFIED WHETHER STAGE 3A OR 3B CKD (HCC): ICD-10-CM

## 2023-07-01 DIAGNOSIS — D64.9 ANEMIA, UNSPECIFIED TYPE: ICD-10-CM

## 2023-07-01 DIAGNOSIS — N28.9 RENAL INSUFFICIENCY: Primary | ICD-10-CM

## 2023-07-01 DIAGNOSIS — C67.9 MALIGNANT NEOPLASM OF URINARY BLADDER, UNSPECIFIED SITE (HCC): ICD-10-CM

## 2023-07-01 NOTE — TELEPHONE ENCOUNTER
Terry like pt had a rise in his kidney function. I would like to redraw the CMP and I may need to see him after. I would suggest increasing fluids.   I iwll place order for cbc and cmp    Please call to inform

## 2023-07-01 NOTE — TELEPHONE ENCOUNTER
Spoke to the patients wife and informed her. She will be coming in on Monday and  a print out of the lab orders he needs done. CISCO.     Bob Dickson LPN

## 2023-07-01 NOTE — TELEPHONE ENCOUNTER
----- Message from Tremaine Pink MD sent at 6/15/2023  1:07 PM EDT -----  Dr. Zoltan Muñoz, it looks like you ordered these.

## 2023-07-03 ENCOUNTER — TELEPHONE (OUTPATIENT)
Dept: FAMILY MEDICINE CLINIC | Facility: CLINIC | Age: 83
End: 2023-07-03

## 2023-07-03 NOTE — TELEPHONE ENCOUNTER
Wife is concerned about paperwork she picked up to do BW for her . It states that he has Kidney disease? They were not aware of that, he has to get more BW done, she has those orders but would like the nurse to call her back concerning the kidney issue.

## 2023-07-03 NOTE — TELEPHONE ENCOUNTER
Dr. Chetan Davis could you explain this so that I can explain to the patient what this means.   Joseph Dowell

## 2023-07-03 NOTE — TELEPHONE ENCOUNTER
7/3/2023 1:20 PM Called Donaldo Hightower to discuss his kidney function.     Called patient twice to discuss his concern  Unable to get through  Phone busy  Melissa Stack, DO

## 2023-07-03 NOTE — TELEPHONE ENCOUNTER
7/3/2023 4:43 PM spoke with his wife and explained that he has had low kidney function for years.   Avoiding all NSAIDs recommended    Message complete  Luz Maria Speak, DO

## 2023-07-07 LAB
ALBUMIN SERPL-MCNC: 3.7 G/DL (ref 3.6–4.6)
ALBUMIN/GLOB SERPL: 1.3 {RATIO} (ref 1.2–2.2)
ALP SERPL-CCNC: 65 IU/L (ref 44–121)
ALT SERPL-CCNC: 11 IU/L (ref 0–44)
AST SERPL-CCNC: 22 IU/L (ref 0–40)
BASOPHILS # BLD AUTO: 0 X10E3/UL (ref 0–0.2)
BASOPHILS NFR BLD AUTO: 1 %
BILIRUB SERPL-MCNC: 0.5 MG/DL (ref 0–1.2)
BUN SERPL-MCNC: 34 MG/DL (ref 8–27)
BUN/CREAT SERPL: 21 (ref 10–24)
CALCIUM SERPL-MCNC: 9.1 MG/DL (ref 8.6–10.2)
CHLORIDE SERPL-SCNC: 104 MMOL/L (ref 96–106)
CO2 SERPL-SCNC: 24 MMOL/L (ref 20–29)
CREAT SERPL-MCNC: 1.64 MG/DL (ref 0.76–1.27)
EGFR: 41 ML/MIN/1.73
EOSINOPHIL # BLD AUTO: 0.3 X10E3/UL (ref 0–0.4)
EOSINOPHIL NFR BLD AUTO: 5 %
ERYTHROCYTE [DISTWIDTH] IN BLOOD BY AUTOMATED COUNT: 11.7 % (ref 11.6–15.4)
GLOBULIN SER-MCNC: 2.8 G/DL (ref 1.5–4.5)
GLUCOSE SERPL-MCNC: 87 MG/DL (ref 70–99)
HCT VFR BLD AUTO: 36.8 % (ref 37.5–51)
HGB BLD-MCNC: 12.1 G/DL (ref 13–17.7)
IMM GRANULOCYTES # BLD: 0 X10E3/UL (ref 0–0.1)
IMM GRANULOCYTES NFR BLD: 0 %
LYMPHOCYTES # BLD AUTO: 0.9 X10E3/UL (ref 0.7–3.1)
LYMPHOCYTES NFR BLD AUTO: 18 %
MCH RBC QN AUTO: 29.2 PG (ref 26.6–33)
MCHC RBC AUTO-ENTMCNC: 32.9 G/DL (ref 31.5–35.7)
MCV RBC AUTO: 89 FL (ref 79–97)
MICRODELETION SYND BLD/T FISH: NORMAL
MONOCYTES # BLD AUTO: 0.5 X10E3/UL (ref 0.1–0.9)
MONOCYTES NFR BLD AUTO: 11 %
NEUTROPHILS # BLD AUTO: 3.4 X10E3/UL (ref 1.4–7)
NEUTROPHILS NFR BLD AUTO: 65 %
PLATELET # BLD AUTO: 186 X10E3/UL (ref 150–450)
POTASSIUM SERPL-SCNC: 4.7 MMOL/L (ref 3.5–5.2)
PROT SERPL-MCNC: 6.5 G/DL (ref 6–8.5)
RBC # BLD AUTO: 4.15 X10E6/UL (ref 4.14–5.8)
SODIUM SERPL-SCNC: 141 MMOL/L (ref 134–144)
WBC # BLD AUTO: 5.2 X10E3/UL (ref 3.4–10.8)

## 2023-07-12 ENCOUNTER — TELEPHONE (OUTPATIENT)
Dept: FAMILY MEDICINE CLINIC | Facility: CLINIC | Age: 83
End: 2023-07-12

## 2023-07-12 NOTE — TELEPHONE ENCOUNTER
Spoke with pt and wife - advised of kidney function. Advised to stay hydrated.   Avoid meds that bother the kidneys

## 2023-10-04 ENCOUNTER — TELEPHONE (OUTPATIENT)
Age: 83
End: 2023-10-04

## 2023-10-04 NOTE — TELEPHONE ENCOUNTER
Patient need to go for his labs before visit,  Please print out labs from 7/1 ordered by Dr. Olga Scott. Patients wife will pick them up. Thank yoU!

## 2023-10-27 LAB
ALBUMIN SERPL-MCNC: 4.2 G/DL (ref 3.7–4.7)
ALBUMIN/GLOB SERPL: 1.6 {RATIO} (ref 1.2–2.2)
ALP SERPL-CCNC: 48 IU/L (ref 44–121)
ALT SERPL-CCNC: 13 IU/L (ref 0–44)
AST SERPL-CCNC: 23 IU/L (ref 0–40)
BASOPHILS # BLD AUTO: 0 X10E3/UL (ref 0–0.2)
BASOPHILS NFR BLD AUTO: 1 %
BILIRUB SERPL-MCNC: 0.3 MG/DL (ref 0–1.2)
BUN SERPL-MCNC: 37 MG/DL (ref 8–27)
BUN/CREAT SERPL: 25 (ref 10–24)
CALCIUM SERPL-MCNC: 8.8 MG/DL (ref 8.6–10.2)
CHLORIDE SERPL-SCNC: 104 MMOL/L (ref 96–106)
CO2 SERPL-SCNC: 24 MMOL/L (ref 20–29)
CREAT SERPL-MCNC: 1.5 MG/DL (ref 0.76–1.27)
EGFR: 46 ML/MIN/1.73
EOSINOPHIL # BLD AUTO: 0.1 X10E3/UL (ref 0–0.4)
EOSINOPHIL NFR BLD AUTO: 3 %
ERYTHROCYTE [DISTWIDTH] IN BLOOD BY AUTOMATED COUNT: 12.5 % (ref 11.6–15.4)
GLOBULIN SER-MCNC: 2.7 G/DL (ref 1.5–4.5)
GLUCOSE SERPL-MCNC: 91 MG/DL (ref 70–99)
HCT VFR BLD AUTO: 38.7 % (ref 37.5–51)
HGB BLD-MCNC: 12.4 G/DL (ref 13–17.7)
IMM GRANULOCYTES # BLD: 0 X10E3/UL (ref 0–0.1)
IMM GRANULOCYTES NFR BLD: 0 %
LYMPHOCYTES # BLD AUTO: 1 X10E3/UL (ref 0.7–3.1)
LYMPHOCYTES NFR BLD AUTO: 18 %
MCH RBC QN AUTO: 29.2 PG (ref 26.6–33)
MCHC RBC AUTO-ENTMCNC: 32 G/DL (ref 31.5–35.7)
MCV RBC AUTO: 91 FL (ref 79–97)
MICRODELETION SYND BLD/T FISH: NORMAL
MONOCYTES # BLD AUTO: 0.4 X10E3/UL (ref 0.1–0.9)
MONOCYTES NFR BLD AUTO: 8 %
NEUTROPHILS # BLD AUTO: 3.8 X10E3/UL (ref 1.4–7)
NEUTROPHILS NFR BLD AUTO: 70 %
PLATELET # BLD AUTO: 173 X10E3/UL (ref 150–450)
POTASSIUM SERPL-SCNC: 4.4 MMOL/L (ref 3.5–5.2)
PROT SERPL-MCNC: 6.9 G/DL (ref 6–8.5)
RBC # BLD AUTO: 4.25 X10E6/UL (ref 4.14–5.8)
SODIUM SERPL-SCNC: 141 MMOL/L (ref 134–144)
WBC # BLD AUTO: 5.4 X10E3/UL (ref 3.4–10.8)

## 2023-11-14 ENCOUNTER — OFFICE VISIT (OUTPATIENT)
Dept: FAMILY MEDICINE CLINIC | Facility: CLINIC | Age: 83
End: 2023-11-14
Payer: COMMERCIAL

## 2023-11-14 VITALS
HEIGHT: 64 IN | BODY MASS INDEX: 24.82 KG/M2 | TEMPERATURE: 98.1 F | WEIGHT: 145.38 LBS | SYSTOLIC BLOOD PRESSURE: 138 MMHG | RESPIRATION RATE: 16 BRPM | HEART RATE: 81 BPM | DIASTOLIC BLOOD PRESSURE: 80 MMHG

## 2023-11-14 DIAGNOSIS — E78.2 MIXED HYPERLIPIDEMIA: ICD-10-CM

## 2023-11-14 DIAGNOSIS — Z23 ENCOUNTER FOR IMMUNIZATION: ICD-10-CM

## 2023-11-14 DIAGNOSIS — D69.6 THROMBOCYTOPENIA (HCC): ICD-10-CM

## 2023-11-14 DIAGNOSIS — F41.9 ANXIETY: ICD-10-CM

## 2023-11-14 DIAGNOSIS — F09 MILD COGNITIVE DISORDER: ICD-10-CM

## 2023-11-14 DIAGNOSIS — D64.9 ANEMIA, UNSPECIFIED TYPE: ICD-10-CM

## 2023-11-14 DIAGNOSIS — N18.31 STAGE 3A CHRONIC KIDNEY DISEASE (HCC): ICD-10-CM

## 2023-11-14 DIAGNOSIS — Z00.00 MEDICARE ANNUAL WELLNESS VISIT, SUBSEQUENT: Primary | ICD-10-CM

## 2023-11-14 PROCEDURE — G0008 ADMIN INFLUENZA VIRUS VAC: HCPCS

## 2023-11-14 PROCEDURE — G0439 PPPS, SUBSEQ VISIT: HCPCS | Performed by: FAMILY MEDICINE

## 2023-11-14 PROCEDURE — 90662 IIV NO PRSV INCREASED AG IM: CPT

## 2023-11-14 RX ORDER — ESCITALOPRAM OXALATE 10 MG/1
10 TABLET ORAL DAILY
Qty: 90 TABLET | Refills: 1 | Status: SHIPPED | OUTPATIENT
Start: 2023-11-14 | End: 2024-05-12

## 2023-11-14 NOTE — PATIENT INSTRUCTIONS
Medicare Preventive Visit Patient Instructions  Thank you for completing your Welcome to Medicare Visit or Medicare Annual Wellness Visit today. Your next wellness visit will be due in one year (11/14/2024). The screening/preventive services that you may require over the next 5-10 years are detailed below. Some tests may not apply to you based off risk factors and/or age. Screening tests ordered at today's visit but not completed yet may show as past due. Also, please note that scanned in results may not display below. Preventive Screenings:  Service Recommendations Previous Testing/Comments   Colorectal Cancer Screening  Colonoscopy    Fecal Occult Blood Test (FOBT)/Fecal Immunochemical Test (FIT)  Fecal DNA/Cologuard Test  Flexible Sigmoidoscopy Age: 43-73 years old   Colonoscopy: every 10 years (May be performed more frequently if at higher risk)  OR  FOBT/FIT: every 1 year  OR  Cologuard: every 3 years  OR  Sigmoidoscopy: every 5 years  Screening may be recommended earlier than age 39 if at higher risk for colorectal cancer. Also, an individualized decision between you and your healthcare provider will decide whether screening between the ages of 77-80 would be appropriate.  Colonoscopy: 11/20/2022  FOBT/FIT: Not on file  Cologuard: 11/20/2022  Sigmoidoscopy: Not on file          Prostate Cancer Screening Individualized decision between patient and health care provider in men between ages of 53-66   Medicare will cover every 12 months beginning on the day after your 50th birthday PSA: 0.3 ng/mL     Screening Not Indicated     Hepatitis C Screening Once for adults born between [address] [place] [address]  More frequently in patients at high risk for Hepatitis C Hep C Antibody: Not on file        Diabetes Screening 1-2 times per year if you're at risk for diabetes or have pre-diabetes Fasting glucose: No results in last 5 years (No results in last 5 years)  A1C: No results in last 5 years (No results in last 5 years)  Screening Current   Cholesterol Screening Once every 5 years if you don't have a lipid disorder. May order more often based on risk factors. Lipid panel: 05/10/2023  Screening Not Indicated  History Lipid Disorder      Other Preventive Screenings Covered by Medicare:  Abdominal Aortic Aneurysm (AAA) Screening: covered once if your at risk. You're considered to be at risk if you have a family history of AAA or a male between the age of 70-76 who smoking at least 100 cigarettes in your lifetime. Lung Cancer Screening: covers low dose CT scan once per year if you meet all of the following conditions: (1) Age 48-67; (2) No signs or symptoms of lung cancer; (3) Current smoker or have quit smoking within the last 15 years; (4) You have a tobacco smoking history of at least 20 pack years (packs per day x number of years you smoked); (5) You get a written order from a healthcare provider. Glaucoma Screening: covered annually if you're considered high risk: (1) You have diabetes OR (2) Family history of glaucoma OR (3)  aged 48 and older OR (3)  American aged 72 and older  Osteoporosis Screening: covered every 2 years if you meet one of the following conditions: (1) Have a vertebral abnormality; (2) On glucocorticoid therapy for more than 3 months; (3) Have primary hyperparathyroidism; (4) On osteoporosis medications and need to assess response to drug therapy. HIV Screening: covered annually if you're between the age of 14-79. Also covered annually if you are younger than 13 and older than 72 with risk factors for HIV infection. For pregnant patients, it is covered up to 3 times per pregnancy.     Immunizations:  Immunization Recommendations   Influenza Vaccine Annual influenza vaccination during flu season is recommended for all persons aged >= 6 months who do not have contraindications   Pneumococcal Vaccine   * Pneumococcal conjugate vaccine = PCV13 (Prevnar 13), PCV15 (Vaxneuvance), PCV20 (Prevnar 20)  * Pneumococcal polysaccharide vaccine = PPSV23 (Pneumovax) Adults 07-80 yo with certain risk factors or if 69+ yo  If never received any pneumonia vaccine: recommend Prevnar 20 (PCV20)  Give PCV20 if previously received 1 dose of PCV13 or PPSV23   Hepatitis B Vaccine 3 dose series if at intermediate or high risk (ex: diabetes, end stage renal disease, liver disease)   Respiratory syncytial virus (RSV) Vaccine - COVERED BY MEDICARE PART D  * RSVPreF3 (Arexvy) CDC recommends that adults 61years of age and older may receive a single dose of RSV vaccine using shared clinical decision-making (SCDM)   Tetanus (Td) Vaccine - COST NOT COVERED BY MEDICARE PART B Following completion of primary series, a booster dose should be given every 10 years to maintain immunity against tetanus. Td may also be given as tetanus wound prophylaxis. Tdap Vaccine - COST NOT COVERED BY MEDICARE PART B Recommended at least once for all adults. For pregnant patients, recommended with each pregnancy. Shingles Vaccine (Shingrix) - COST NOT COVERED BY MEDICARE PART B  2 shot series recommended in those 19 years and older who have or will have weakened immune systems or those 50 years and older     Health Maintenance Due:  There are no preventive care reminders to display for this patient. Immunizations Due:      Topic Date Due   • COVID-19 Vaccine (5 - Moderna series) 02/21/2022   • Influenza Vaccine (1) 09/01/2023     Advance Directives   What are advance directives? Advance directives are legal documents that state your wishes and plans for medical care. These plans are made ahead of time in case you lose your ability to make decisions for yourself. Advance directives can apply to any medical decision, such as the treatments you want, and if you want to donate organs. What are the types of advance directives? There are many types of advance directives, and each state has rules about how to use them.  You may choose a combination of any of the following:  Living will: This is a written record of the treatment you want. You can also choose which treatments you do not want, which to limit, and which to stop at a certain time. This includes surgery, medicine, IV fluid, and tube feedings. Durable power of  for healthcare [facility]): This is a written record that states who you want to make healthcare choices for you when you are unable to make them for yourself. This person, called a proxy, is usually a family member or a friend. You may choose more than 1 proxy. Do not resuscitate (DNR) order:  A DNR order is used in case your heart stops beating or you stop breathing. It is a request not to have certain forms of treatment, such as CPR. A DNR order may be included in other types of advance directives. Medical directive: This covers the care that you want if you are in a coma, near death, or unable to make decisions for yourself. You can list the treatments you want for each condition. Treatment may include pain medicine, surgery, blood transfusions, dialysis, IV or tube feedings, and a ventilator (breathing machine). Values history: This document has questions about your views, beliefs, and how you feel and think about life. This information can help others choose the care that you would choose. Why are advance directives important? An advance directive helps you control your care. Although spoken wishes may be used, it is better to have your wishes written down. Spoken wishes can be misunderstood, or not followed. Treatments may be given even if you do not want them. An advance directive may make it easier for your family to make difficult choices about your care. © Copyright [employer] 2018 Information is for End User's use only and may not be sold, redistributed or otherwise used for commercial purposes.  All illustrations and images included in CareNotes® are the copyrighted property of [employer].D.A[employer]M., Inc[employer] or [address]

## 2023-11-14 NOTE — PROGRESS NOTES
Assessment and Plan:     Problem List Items Addressed This Visit        Nervous and Auditory    Mild cognitive disorder     Has tried an faile prevagen  Currently on a vitamin arianna mind - that did not help         Relevant Orders    CBC and differential    Iron Panel (Includes Ferritin, Iron Sat%, Iron, and TIBC)    Comprehensive metabolic panel    Lipid Panel with Direct LDL reflex       Hematopoietic and Hemostatic    Thrombocytopenia (HCC)    Relevant Orders    CBC and differential    Iron Panel (Includes Ferritin, Iron Sat%, Iron, and TIBC)    Comprehensive metabolic panel    Lipid Panel with Direct LDL reflex       Genitourinary    Kidney disease, chronic, stage III (GFR 30-59 ml/min) (HCC)    Relevant Orders    CBC and differential    Iron Panel (Includes Ferritin, Iron Sat%, Iron, and TIBC)    Comprehensive metabolic panel    Lipid Panel with Direct LDL reflex       Other    Mixed hyperlipidemia     No meds at this time         Relevant Orders    CBC and differential    Iron Panel (Includes Ferritin, Iron Sat%, Iron, and TIBC)    Comprehensive metabolic panel    Lipid Panel with Direct LDL reflex    Anxiety     Will try lexapro         Relevant Medications    escitalopram (Lexapro) 10 mg tablet    Other Relevant Orders    CBC and differential    Iron Panel (Includes Ferritin, Iron Sat%, Iron, and TIBC)    Comprehensive metabolic panel    Lipid Panel with Direct LDL reflex   Other Visit Diagnoses     Medicare annual wellness visit, subsequent    -  Primary    Encounter for immunization        Relevant Orders    influenza vaccine, high-dose, PF 0.7 mL (FLUZONE HIGH-DOSE) (Completed)    Anemia, unspecified type        Relevant Orders    CBC and differential    Iron Panel (Includes Ferritin, Iron Sat%, Iron, and TIBC)    Comprehensive metabolic panel    Lipid Panel with Direct LDL reflex          Depression Screening and Follow-up Plan: Patient was screened for depression during today's encounter.  They screened negative with a PHQ-2 score of 0. Preventive health issues were discussed with patient, and age appropriate screening tests were ordered as noted in patient's After Visit Summary. Personalized health advice and appropriate referrals for health education or preventive services given if needed, as noted in patient's After Visit Summary. History of Present Illness:     Patient presents for a Medicare Wellness Visit    Pt is here for an AWV    Pt sees neurology - was advised by neurology , as per wife, top wait on meds for now. Wife states pt has a lot of anxiety.            Patient Care Team:  Emmanuel Fraga DO as PCP - General (Family Medicine)  Emmanuel Fraga DO as PCP - [address] [place] [address] (RTE)  Cain Mchugh MD as Consulting Physician (Neurology)  [facility] as Consulting Physician (Ophthalmology)     Review of Systems:     Review of Systems     Problem List:     Patient Active Problem List   Diagnosis   • Bladder cancer 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)   • Osteopenia   • Mild aortic stenosis   • Anxiety      Past Medical and Surgical History:     Past Medical History:   Diagnosis Date   • Benign neoplasm of pituitary gland ([address])    • Organic impotence    • Pneumonia     of left lower lobe due to infectious organism, last assessed 3/22/16, resolved 12/6/16   • Transient cerebral ischemia      Past Surgical History:   Procedure Laterality Date   • BRAIN SURGERY     • CATARACT EXTRACTION Bilateral       Family History:     Family History   Problem Relation Age of Onset   • No Known Problems Mother    • No Known Problems Father    • Mental illness Neg Hx       Social History:     Social History     Socioeconomic History   • Marital status: /Civil Union     Spouse name: None   • Number of children: None   • Years of education: None   • Highest education level: None   Occupational History   • None   Tobacco Use   • Smoking status: Former     Types: Cigarettes   • Smokeless tobacco: Never   Vaping Use   • Vaping Use: Never used   Substance and Sexual Activity   • Alcohol use: Yes     Comment: rare   • Drug use: Never   • Sexual activity: None   Other Topics Concern   • None   Social History Narrative   • None     Social Determinants of Health     Financial Resource Strain: Low Risk  (11/14/2023)    Overall Financial Resource Strain (CARDIA)    • Difficulty of Paying Living Expenses: Not hard at all   Food Insecurity: Not on file   Transportation Needs: No Transportation Needs (11/14/2023)    PRAPARE - Transportation    • Lack of Transportation (Medical): No    • Lack of Transportation (Non-Medical): No   Physical Activity: Not on file   Stress: Not on file   Social Connections: Not on file   Intimate Partner Violence: Not on file   Housing Stability: Not on file      Medications and Allergies:     Current Outpatient Medications   Medication Sig Dispense Refill   • Bacillus Coagulans-Inulin (Probiotic) 1-250 BILLION-MG CAPS      • Bioflavonoid Products (SYMONE-C/BIOFLAVONOIDS PO)      • DHEA 25 MG CAPS      • escitalopram (Lexapro) 10 mg tablet Take 1 tablet (10 mg total) by mouth daily 90 tablet 1   • GINKGO BILOBA COMPLEX PO      • Magnesium Gluconate 550 MG TABS      • Multiple Vitamins-Minerals (ZINC PO)      • Omega-3 Fatty Acids (OMEGA 3 PO)        No current facility-administered medications for this visit.      No Known Allergies   Immunizations:     Immunization History   Administered Date(s) Administered   • COVID-19 MODERNA VACC 0.25 ML IM BOOSTER 12/27/2021   • COVID-19 MODERNA VACC 0.5 ML IM 02/17/2021, 03/17/2021, 12/27/2021   • Influenza Split High Dose Preservative Free IM 10/11/2012, 09/27/2013, 10/13/2014, 10/15/2015, 10/11/2016, 09/21/2017   • Influenza, high dose seasonal 0.7 mL 09/25/2018, 10/09/2019, 10/14/2020, 10/27/2021, 11/09/2022, 11/14/2023   • Influenza, seasonal, injectable 10/28/2010, 11/02/2011   • Pneumococcal Conjugate 13-Valent 01/09/2017   • Pneumococcal Polysaccharide PPV23 09/27/2013   • Td (adult), adsorbed 09/16/1997      Health Maintenance: There are no preventive care reminders to display for this patient. Topic Date Due   • COVID-19 Vaccine (5 - Moderna series) 02/21/2022      Medicare Screening Tests and Risk Assessments:     Raymond File is here for his Subsequent Wellness visit. Last Medicare Wellness visit information reviewed, patient interviewed, no change since last AWV. Health Risk Assessment:   Patient rates overall health as very good. Patient feels that their physical health rating is same. Patient is satisfied with their life. Eyesight was rated as same. Hearing was rated as same. Patient feels that their emotional and mental health rating is slightly worse. Patients states they are never, rarely angry. Patient states they are never, rarely unusually tired/fatigued. Pain experienced in the last 7 days has been none. Patient states that he has experienced no weight loss or gain in last 6 months. Depression Screening:   PHQ-2 Score: 0      Fall Risk Screening: In the past year, patient has experienced: no history of falling in past year      Home Safety:  Patient does not have trouble with stairs inside or outside of their home. Patient has working smoke alarms and has working carbon monoxide detector. Home safety hazards include: none. Nutrition:   Current diet is Regular and Limited junk food. Organic diet     Medications:   Patient is currently taking over-the-counter supplements. OTC medications include: see medication list. Patient is not able to manage medications.  Wife handles medications     Activities of Daily Living (ADLs)/Instrumental Activities of Daily Living (IADLs):   Walk and transfer into and out of bed and chair?: Yes  Dress and groom yourself?: Yes    Bathe or shower yourself?: Yes    Feed yourself? Yes  Do your laundry/housekeeping?: No  Manage your money, pay your bills and track your expenses?: No  Make your own meals?: No    Do your own shopping?: No    ADL comments: Wife manages everything, guides him through bathing     Previous Hospitalizations:   Any hospitalizations or ED visits within the last 12 months?: No      Advance Care Planning:   Living will: Yes    Durable POA for healthcare: Yes    Advanced directive: Yes      Cognitive Screening:   Provider or family/friend/caregiver concerned regarding cognition?: Yes    Cognition Comments: Pt with known cognitive decline. - memory has been getting worse. PREVENTIVE SCREENINGS      Cardiovascular Screening:    General: Screening Not Indicated, History Lipid Disorder and Risks and Benefits Discussed    Due for: Lipid Panel      Diabetes Screening:     General: Screening Current and Risks and Benefits Discussed    Due for: Blood Glucose      Colorectal Cancer Screening:     General: Risks and Benefits Discussed and Screening Current      Prostate Cancer Screening:    General: Screening Not Indicated, Risks and Benefits Discussed and Screening Current      Osteoporosis Screening:    General: Risks and Benefits Discussed and Screening Current      Abdominal Aortic Aneurysm (AAA) Screening:    Risk factors include: tobacco use        General: Screening Not Indicated      Lung Cancer Screening:     General: Screening Not Indicated      Hepatitis C Screening:    General: Risks and Benefits Discussed and Patient Declines    Screening, Brief Intervention, and Referral to Treatment (SBIRT)    Screening  Typical number of drinks in a day: 0  Typical number of drinks in a week: 0  Interpretation: Low risk drinking behavior.     AUDIT-C Screenin) How often did you have a drink containing alcohol in the past year? monthly or less  3) How often did you have 6 or more drinks on one occasion in the past year? never    Single Item Drug Screening:  How often have you used an illegal drug (including marijuana) or a prescription medication for non-medical reasons in the past year? never    Single Item Drug Screen Score: 0  Interpretation: Negative screen for possible drug use disorder    No results found.      Physical Exam:     /80   Pulse 81   Temp 98.1 °F (36.7 °C) (Temporal)   Resp 16   Ht 5' 4" (1.626 m)   Wt 65.9 kg (145 lb 6 oz)   BMI 24.95 kg/m²     Physical Exam       Recent Results (from the past 672 hour(s))   Raymundo Hoffmann Default    Collection Time: 10/26/23  8:56 AM   Result Value Ref Range    White Blood Cell Count 5.4 3.4 - 10.8 x10E3/uL    Red Blood Cell Count 4.25 4.14 - 5.80 x10E6/uL    Hemoglobin 12.4 (L) 13.0 - 17.7 g/dL    HCT 38.7 37.5 - 51.0 %    MCV 91 79 - 97 fL    MCH 29.2 26.6 - 33.0 pg    MCHC 32.0 31.5 - 35.7 g/dL    RDW 12.5 11.6 - 15.4 %    Platelet Count 786 589 - 450 x10E3/uL    Neutrophils 70 Not Estab. %    Lymphocytes 18 Not Estab. %    Monocytes 8 Not Estab. %    Eosinophils 3 Not Estab. %    Basophils PCT 1 Not Estab. %    Neutrophils (Absolute) 3.8 1.4 - 7.0 x10E3/uL    Lymphocytes (Absolute) 1.0 0.7 - 3.1 x10E3/uL    Monocytes (Absolute) 0.4 0.1 - 0.9 x10E3/uL    Eosinophils (Absolute) 0.1 0.0 - 0.4 x10E3/uL    Basophils ABS 0.0 0.0 - 0.2 x10E3/uL    Immature Granulocytes 0 Not Estab. %    Immature Granulocytes (Absolute) 0.0 0.0 - 0.1 x10E3/uL   Comprehensive metabolic panel    Collection Time: 10/26/23  8:56 AM   Result Value Ref Range    Glucose, Random 91 70 - 99 mg/dL    BUN 37 (H) 8 - 27 mg/dL    Creatinine 1.50 (H) 0.76 - 1.27 mg/dL    eGFR 46 (L) >59 mL/min/1.73    SL AMB BUN/CREATININE RATIO 25 (H) 10 - 24    Sodium 141 134 - 144 mmol/L    Potassium 4.4 3.5 - 5.2 mmol/L    Chloride 104 96 - 106 mmol/L    CO2 24 20 - 29 mmol/L    CALCIUM 8.8 8.6 - 10.2 mg/dL    Protein, Total 6.9 6.0 - 8.5 g/dL    Albumin 4.2 3.7 - 4.7 g/dL    Globulin, Total 2.7 1.5 - 4.5 g/dL    Albumin/Globulin Ratio 1.6 1.2 - 2.2    TOTAL BILIRUBIN 0.3 0.0 - 1.2 mg/dL    Alk Phos Isoenzymes 48 44 - 121 IU/L    AST 23 0 - 40 IU/L    ALT 13 0 - 44 IU/L   [facility] Kidney Stone Panel    Collection Time: 10/26/23  8:56 AM   Result Value Ref Range    Interpretation Note        Nestor Beasley DO

## 2023-12-21 ENCOUNTER — OFFICE VISIT (OUTPATIENT)
Dept: FAMILY MEDICINE CLINIC | Facility: CLINIC | Age: 83
End: 2023-12-21
Payer: COMMERCIAL

## 2023-12-21 VITALS
HEIGHT: 64 IN | SYSTOLIC BLOOD PRESSURE: 140 MMHG | DIASTOLIC BLOOD PRESSURE: 72 MMHG | HEART RATE: 72 BPM | TEMPERATURE: 96.7 F | RESPIRATION RATE: 16 BRPM | WEIGHT: 146.2 LBS | BODY MASS INDEX: 24.96 KG/M2

## 2023-12-21 DIAGNOSIS — J01.00 ACUTE NON-RECURRENT MAXILLARY SINUSITIS: Primary | ICD-10-CM

## 2023-12-21 PROCEDURE — 99213 OFFICE O/P EST LOW 20 MIN: CPT | Performed by: FAMILY MEDICINE

## 2023-12-21 RX ORDER — METHYLPREDNISOLONE 4 MG/1
TABLET ORAL
Qty: 21 EACH | Refills: 0 | Status: SHIPPED | OUTPATIENT
Start: 2023-12-21

## 2023-12-21 RX ORDER — BENZONATATE 200 MG/1
200 CAPSULE ORAL 3 TIMES DAILY PRN
Qty: 30 CAPSULE | Refills: 0 | Status: SHIPPED | OUTPATIENT
Start: 2023-12-21

## 2023-12-21 RX ORDER — AZITHROMYCIN 250 MG/1
TABLET, FILM COATED ORAL
Qty: 6 TABLET | Refills: 0 | Status: SHIPPED | OUTPATIENT
Start: 2023-12-21 | End: 2023-12-26

## 2023-12-21 NOTE — PROGRESS NOTES
Assessment/Plan:    1. Acute non-recurrent maxillary sinusitis  -     azithromycin (ZITHROMAX) 250 mg tablet; 2 tabs on day 1, 1 tab a day for 4 days after  -     methylPREDNISolone 4 MG tablet therapy pack; Use as directed on package  -     benzonatate (TESSALON) 200 MG capsule; Take 1 capsule (200 mg total) by mouth 3 (three) times a day as needed for cough    Covid test is negative  Will treat as ordered.  Will follow results        There are no Patient Instructions on file for this visit.    No follow-ups on file.    Subjective:      Patient ID: Rohith Zaldivar is a 83 y.o. male.    Chief Complaint   Patient presents with   • Fever     Symptoms started monday Nafisa Forbes LPN     • Sore Throat   • Cough   • Generalized Body Aches   • Nasal Congestion       Same day appt    Pt with wife  Had a fever last night of 102  Congested and achy and coughing  Head hurts as well  Pt also with a backach  Symptoms started 4 days ago        The following portions of the patient's history were reviewed and updated as appropriate: allergies, current medications, past family history, past medical history, past social history, past surgical history and problem list.    Review of Systems   Constitutional:  Positive for chills and fever.   HENT:  Positive for congestion and sinus pressure.    Respiratory:  Positive for cough.          Current Outpatient Medications   Medication Sig Dispense Refill   • azithromycin (ZITHROMAX) 250 mg tablet 2 tabs on day 1, 1 tab a day for 4 days after 6 tablet 0   • Bacillus Coagulans-Inulin (Probiotic) 1-250 BILLION-MG CAPS      • benzonatate (TESSALON) 200 MG capsule Take 1 capsule (200 mg total) by mouth 3 (three) times a day as needed for cough 30 capsule 0   • Bioflavonoid Products (SYMONE-C/BIOFLAVONOIDS PO)      • DHEA 25 MG CAPS      • escitalopram (Lexapro) 10 mg tablet Take 1 tablet (10 mg total) by mouth daily 90 tablet 1   • GINKGO BILOBA COMPLEX PO      • Magnesium Gluconate 550 MG  "TABS      • methylPREDNISolone 4 MG tablet therapy pack Use as directed on package 21 each 0   • Multiple Vitamins-Minerals (ZINC PO)      • Omega-3 Fatty Acids (OMEGA 3 PO)        No current facility-administered medications for this visit.       Objective:    /72   Pulse 72   Temp (!) 96.7 °F (35.9 °C)   Resp 16   Ht 5' 4\" (1.626 m)   Wt 66.3 kg (146 lb 3.2 oz)   BMI 25.10 kg/m²        Physical Exam  HENT:      Nose: Congestion and rhinorrhea present.                Frank Lombardi, DO  "

## 2024-01-01 ENCOUNTER — APPOINTMENT (EMERGENCY)
Dept: RADIOLOGY | Facility: HOSPITAL | Age: 84
DRG: 085 | End: 2024-01-01
Payer: COMMERCIAL

## 2024-01-01 ENCOUNTER — APPOINTMENT (INPATIENT)
Dept: NEUROLOGY | Facility: HOSPITAL | Age: 84
DRG: 085 | End: 2024-01-01
Attending: STUDENT IN AN ORGANIZED HEALTH CARE EDUCATION/TRAINING PROGRAM
Payer: COMMERCIAL

## 2024-01-01 ENCOUNTER — APPOINTMENT (INPATIENT)
Dept: RADIOLOGY | Facility: HOSPITAL | Age: 84
DRG: 085 | End: 2024-01-01
Payer: COMMERCIAL

## 2024-01-01 ENCOUNTER — HOSPITAL ENCOUNTER (INPATIENT)
Facility: HOSPITAL | Age: 84
LOS: 12 days | DRG: 085 | End: 2024-06-24
Attending: EMERGENCY MEDICINE | Admitting: STUDENT IN AN ORGANIZED HEALTH CARE EDUCATION/TRAINING PROGRAM
Payer: COMMERCIAL

## 2024-01-01 VITALS
HEIGHT: 64 IN | DIASTOLIC BLOOD PRESSURE: 54 MMHG | SYSTOLIC BLOOD PRESSURE: 78 MMHG | RESPIRATION RATE: 20 BRPM | HEART RATE: 111 BPM | WEIGHT: 156.31 LBS | BODY MASS INDEX: 26.69 KG/M2 | TEMPERATURE: 97.9 F | OXYGEN SATURATION: 87 %

## 2024-01-01 DIAGNOSIS — Z71.89 GOALS OF CARE, COUNSELING/DISCUSSION: ICD-10-CM

## 2024-01-01 DIAGNOSIS — S06.5XAA SUBDURAL HEMATOMA (HCC): Primary | ICD-10-CM

## 2024-01-01 DIAGNOSIS — R56.9 SEIZURE (HCC): ICD-10-CM

## 2024-01-01 LAB
ABO GROUP BLD: NORMAL
ABO GROUP BLD: NORMAL
ALBUMIN SERPL BCG-MCNC: 4.2 G/DL (ref 3.5–5)
ALP SERPL-CCNC: 78 U/L (ref 34–104)
ALT SERPL W P-5'-P-CCNC: 21 U/L (ref 7–52)
ANION GAP SERPL CALCULATED.3IONS-SCNC: -3 MMOL/L (ref 4–13)
APTT PPP: 27 SECONDS (ref 23–37)
AST SERPL W P-5'-P-CCNC: 32 U/L (ref 13–39)
ATRIAL RATE: 59 BPM
BASOPHILS # BLD AUTO: 0.02 THOUSANDS/ÂΜL (ref 0–0.1)
BASOPHILS NFR BLD AUTO: 0 % (ref 0–1)
BILIRUB SERPL-MCNC: 0.67 MG/DL (ref 0.2–1)
BLD GP AB SCN SERPL QL: NEGATIVE
BUN SERPL-MCNC: 32 MG/DL (ref 5–25)
CALCIUM SERPL-MCNC: 9.4 MG/DL (ref 8.4–10.2)
CHLORIDE SERPL-SCNC: 107 MMOL/L (ref 96–108)
CO2 SERPL-SCNC: 28 MMOL/L (ref 21–32)
CREAT SERPL-MCNC: 1.4 MG/DL (ref 0.6–1.3)
EOSINOPHIL # BLD AUTO: 0 THOUSAND/ÂΜL (ref 0–0.61)
EOSINOPHIL NFR BLD AUTO: 0 % (ref 0–6)
ERYTHROCYTE [DISTWIDTH] IN BLOOD BY AUTOMATED COUNT: 12.6 % (ref 11.6–15.1)
GFR SERPL CREATININE-BSD FRML MDRD: 45 ML/MIN/1.73SQ M
GLUCOSE SERPL-MCNC: 108 MG/DL (ref 65–140)
GLUCOSE SERPL-MCNC: 112 MG/DL (ref 65–140)
HCT VFR BLD AUTO: 37.1 % (ref 36.5–49.3)
HGB BLD-MCNC: 12.2 G/DL (ref 12–17)
IMM GRANULOCYTES # BLD AUTO: 0.03 THOUSAND/UL (ref 0–0.2)
IMM GRANULOCYTES NFR BLD AUTO: 0 % (ref 0–2)
INR PPP: 1.1 (ref 0.84–1.19)
LYMPHOCYTES # BLD AUTO: 0.44 THOUSANDS/ÂΜL (ref 0.6–4.47)
LYMPHOCYTES NFR BLD AUTO: 6 % (ref 14–44)
MAGNESIUM SERPL-MCNC: 2.1 MG/DL (ref 1.9–2.7)
MCH RBC QN AUTO: 29.4 PG (ref 26.8–34.3)
MCHC RBC AUTO-ENTMCNC: 32.9 G/DL (ref 31.4–37.4)
MCV RBC AUTO: 89 FL (ref 82–98)
MONOCYTES # BLD AUTO: 0.44 THOUSAND/ÂΜL (ref 0.17–1.22)
MONOCYTES NFR BLD AUTO: 6 % (ref 4–12)
NEUTROPHILS # BLD AUTO: 6.03 THOUSANDS/ÂΜL (ref 1.85–7.62)
NEUTS SEG NFR BLD AUTO: 88 % (ref 43–75)
NRBC BLD AUTO-RTO: 0 /100 WBCS
P AXIS: 21 DEGREES
PLATELET # BLD AUTO: 162 THOUSANDS/UL (ref 149–390)
PMV BLD AUTO: 10.6 FL (ref 8.9–12.7)
POTASSIUM SERPL-SCNC: 4.4 MMOL/L (ref 3.5–5.3)
PR INTERVAL: 220 MS
PROT SERPL-MCNC: 7.5 G/DL (ref 6.4–8.4)
PROTHROMBIN TIME: 14.4 SECONDS (ref 11.6–14.5)
QRS AXIS: -19 DEGREES
QRSD INTERVAL: 84 MS
QT INTERVAL: 428 MS
QTC INTERVAL: 423 MS
RBC # BLD AUTO: 4.15 MILLION/UL (ref 3.88–5.62)
RH BLD: POSITIVE
RH BLD: POSITIVE
SODIUM SERPL-SCNC: 132 MMOL/L (ref 135–147)
SPECIMEN EXPIRATION DATE: NORMAL
T WAVE AXIS: -4 DEGREES
VENTRICULAR RATE: 59 BPM
WBC # BLD AUTO: 6.96 THOUSAND/UL (ref 4.31–10.16)

## 2024-01-01 PROCEDURE — 99238 HOSP IP/OBS DSCHRG MGMT 30/<: CPT | Performed by: NURSE PRACTITIONER

## 2024-01-01 PROCEDURE — 99231 SBSQ HOSP IP/OBS SF/LOW 25: CPT | Performed by: INTERNAL MEDICINE

## 2024-01-01 PROCEDURE — 86901 BLOOD TYPING SEROLOGIC RH(D): CPT | Performed by: NEUROLOGICAL SURGERY

## 2024-01-01 PROCEDURE — 95816 EEG AWAKE AND DROWSY: CPT

## 2024-01-01 PROCEDURE — 99232 SBSQ HOSP IP/OBS MODERATE 35: CPT | Performed by: STUDENT IN AN ORGANIZED HEALTH CARE EDUCATION/TRAINING PROGRAM

## 2024-01-01 PROCEDURE — 99223 1ST HOSP IP/OBS HIGH 75: CPT | Performed by: STUDENT IN AN ORGANIZED HEALTH CARE EDUCATION/TRAINING PROGRAM

## 2024-01-01 PROCEDURE — 93005 ELECTROCARDIOGRAM TRACING: CPT

## 2024-01-01 PROCEDURE — 99285 EMERGENCY DEPT VISIT HI MDM: CPT

## 2024-01-01 PROCEDURE — 93010 ELECTROCARDIOGRAM REPORT: CPT | Performed by: INTERNAL MEDICINE

## 2024-01-01 PROCEDURE — 90715 TDAP VACCINE 7 YRS/> IM: CPT | Performed by: EMERGENCY MEDICINE

## 2024-01-01 PROCEDURE — 82948 REAGENT STRIP/BLOOD GLUCOSE: CPT

## 2024-01-01 PROCEDURE — 90471 IMMUNIZATION ADMIN: CPT

## 2024-01-01 PROCEDURE — 85025 COMPLETE CBC W/AUTO DIFF WBC: CPT | Performed by: EMERGENCY MEDICINE

## 2024-01-01 PROCEDURE — 99449 NTRPROF PH1/NTRNET/EHR 31/>: CPT | Performed by: NEUROLOGICAL SURGERY

## 2024-01-01 PROCEDURE — 70450 CT HEAD/BRAIN W/O DYE: CPT

## 2024-01-01 PROCEDURE — 80053 COMPREHEN METABOLIC PANEL: CPT | Performed by: EMERGENCY MEDICINE

## 2024-01-01 PROCEDURE — 85610 PROTHROMBIN TIME: CPT | Performed by: NEUROLOGICAL SURGERY

## 2024-01-01 PROCEDURE — 85730 THROMBOPLASTIN TIME PARTIAL: CPT | Performed by: NEUROLOGICAL SURGERY

## 2024-01-01 PROCEDURE — NC001 PR NO CHARGE: Performed by: NEUROLOGICAL SURGERY

## 2024-01-01 PROCEDURE — 86900 BLOOD TYPING SEROLOGIC ABO: CPT | Performed by: NEUROLOGICAL SURGERY

## 2024-01-01 PROCEDURE — 83735 ASSAY OF MAGNESIUM: CPT | Performed by: EMERGENCY MEDICINE

## 2024-01-01 PROCEDURE — 99231 SBSQ HOSP IP/OBS SF/LOW 25: CPT | Performed by: STUDENT IN AN ORGANIZED HEALTH CARE EDUCATION/TRAINING PROGRAM

## 2024-01-01 PROCEDURE — 86850 RBC ANTIBODY SCREEN: CPT | Performed by: NEUROLOGICAL SURGERY

## 2024-01-01 PROCEDURE — 95816 EEG AWAKE AND DROWSY: CPT | Performed by: PSYCHIATRY & NEUROLOGY

## 2024-01-01 PROCEDURE — 36415 COLL VENOUS BLD VENIPUNCTURE: CPT | Performed by: EMERGENCY MEDICINE

## 2024-01-01 PROCEDURE — 99285 EMERGENCY DEPT VISIT HI MDM: CPT | Performed by: EMERGENCY MEDICINE

## 2024-01-01 RX ORDER — ESCITALOPRAM OXALATE 10 MG/1
20 TABLET ORAL DAILY
Status: DISCONTINUED | OUTPATIENT
Start: 2024-01-01 | End: 2024-01-01

## 2024-01-01 RX ORDER — MAGNESIUM HYDROXIDE/ALUMINUM HYDROXICE/SIMETHICONE 120; 1200; 1200 MG/30ML; MG/30ML; MG/30ML
30 SUSPENSION ORAL EVERY 6 HOURS PRN
Status: DISCONTINUED | OUTPATIENT
Start: 2024-01-01 | End: 2024-01-01 | Stop reason: HOSPADM

## 2024-01-01 RX ORDER — LEVETIRACETAM 500 MG/1
500 TABLET ORAL EVERY 12 HOURS SCHEDULED
Status: DISCONTINUED | OUTPATIENT
Start: 2024-01-01 | End: 2024-01-01

## 2024-01-01 RX ORDER — LORAZEPAM 2 MG/ML
1 INJECTION INTRAMUSCULAR EVERY 4 HOURS PRN
Status: COMPLETED | OUTPATIENT
Start: 2024-01-01 | End: 2024-01-01

## 2024-01-01 RX ORDER — SCOLOPAMINE TRANSDERMAL SYSTEM 1 MG/1
1 PATCH, EXTENDED RELEASE TRANSDERMAL
Status: DISCONTINUED | OUTPATIENT
Start: 2024-01-01 | End: 2024-01-01 | Stop reason: HOSPADM

## 2024-01-01 RX ORDER — ONDANSETRON 2 MG/ML
4 INJECTION INTRAMUSCULAR; INTRAVENOUS EVERY 6 HOURS PRN
Status: DISCONTINUED | OUTPATIENT
Start: 2024-01-01 | End: 2024-01-01 | Stop reason: HOSPADM

## 2024-01-01 RX ORDER — LORAZEPAM 2 MG/ML
1 INJECTION INTRAMUSCULAR EVERY 4 HOURS PRN
Status: DISCONTINUED | OUTPATIENT
Start: 2024-01-01 | End: 2024-01-01 | Stop reason: HOSPADM

## 2024-01-01 RX ORDER — ACETAMINOPHEN 650 MG/1
650 SUPPOSITORY RECTAL EVERY 8 HOURS PRN
Status: DISCONTINUED | OUTPATIENT
Start: 2024-01-01 | End: 2024-01-01 | Stop reason: HOSPADM

## 2024-01-01 RX ORDER — GLYCOPYRROLATE 0.2 MG/ML
0.1 INJECTION INTRAMUSCULAR; INTRAVENOUS EVERY 4 HOURS PRN
Status: DISCONTINUED | OUTPATIENT
Start: 2024-01-01 | End: 2024-01-01 | Stop reason: HOSPADM

## 2024-01-01 RX ORDER — ACETAMINOPHEN 160 MG/5ML
650 SUSPENSION ORAL EVERY 4 HOURS PRN
Status: DISCONTINUED | OUTPATIENT
Start: 2024-01-01 | End: 2024-01-01

## 2024-01-01 RX ORDER — QUETIAPINE FUMARATE 25 MG/1
25 TABLET, FILM COATED ORAL
Status: DISCONTINUED | OUTPATIENT
Start: 2024-01-01 | End: 2024-01-01

## 2024-01-01 RX ORDER — LANOLIN ALCOHOL/MO/W.PET/CERES
3 CREAM (GRAM) TOPICAL
Status: DISCONTINUED | OUTPATIENT
Start: 2024-01-01 | End: 2024-01-01

## 2024-01-01 RX ORDER — GINSENG 100 MG
1 CAPSULE ORAL ONCE
Status: COMPLETED | OUTPATIENT
Start: 2024-01-01 | End: 2024-01-01

## 2024-01-01 RX ADMIN — MORPHINE SULFATE 2 MG: 2 INJECTION, SOLUTION INTRAMUSCULAR; INTRAVENOUS at 00:16

## 2024-01-01 RX ADMIN — LORAZEPAM 1 MG: 2 INJECTION INTRAMUSCULAR; INTRAVENOUS at 21:25

## 2024-01-01 RX ADMIN — LEVETIRACETAM 500 MG: 500 TABLET, FILM COATED ORAL at 09:34

## 2024-01-01 RX ADMIN — MORPHINE SULFATE 2 MG: 2 INJECTION, SOLUTION INTRAMUSCULAR; INTRAVENOUS at 19:51

## 2024-01-01 RX ADMIN — MORPHINE SULFATE 2 MG: 2 INJECTION, SOLUTION INTRAMUSCULAR; INTRAVENOUS at 14:29

## 2024-01-01 RX ADMIN — MORPHINE SULFATE 2 MG: 2 INJECTION, SOLUTION INTRAMUSCULAR; INTRAVENOUS at 23:19

## 2024-01-01 RX ADMIN — ESCITALOPRAM OXALATE 20 MG: 10 TABLET ORAL at 09:34

## 2024-01-01 RX ADMIN — MORPHINE SULFATE 2 MG: 2 INJECTION, SOLUTION INTRAMUSCULAR; INTRAVENOUS at 14:12

## 2024-01-01 RX ADMIN — QUETIAPINE FUMARATE 25 MG: 25 TABLET ORAL at 21:56

## 2024-01-01 RX ADMIN — Medication 1 MG/HR: at 08:54

## 2024-01-01 RX ADMIN — MORPHINE SULFATE 2 MG: 2 INJECTION, SOLUTION INTRAMUSCULAR; INTRAVENOUS at 10:06

## 2024-01-01 RX ADMIN — ESCITALOPRAM OXALATE 20 MG: 10 TABLET ORAL at 16:07

## 2024-01-01 RX ADMIN — MORPHINE SULFATE 2 MG: 2 INJECTION, SOLUTION INTRAMUSCULAR; INTRAVENOUS at 18:11

## 2024-01-01 RX ADMIN — Medication 3 MG: at 22:19

## 2024-01-01 RX ADMIN — GLYCOPYRROLATE 0.1 MG: 0.2 INJECTION, SOLUTION INTRAMUSCULAR; INTRAVENOUS at 18:11

## 2024-01-01 RX ADMIN — MORPHINE SULFATE 2 MG: 2 INJECTION, SOLUTION INTRAMUSCULAR; INTRAVENOUS at 20:08

## 2024-01-01 RX ADMIN — LORAZEPAM 1 MG: 2 INJECTION INTRAMUSCULAR; INTRAVENOUS at 13:10

## 2024-01-01 RX ADMIN — MORPHINE SULFATE 2 MG: 2 INJECTION, SOLUTION INTRAMUSCULAR; INTRAVENOUS at 10:50

## 2024-01-01 RX ADMIN — Medication 3 MG: at 21:05

## 2024-01-01 RX ADMIN — ACETAMINOPHEN 650 MG: 650 SUPPOSITORY RECTAL at 16:38

## 2024-01-01 RX ADMIN — SCOPALAMINE 1 PATCH: 1 PATCH, EXTENDED RELEASE TRANSDERMAL at 18:11

## 2024-01-01 RX ADMIN — QUETIAPINE FUMARATE 25 MG: 25 TABLET ORAL at 21:05

## 2024-01-01 RX ADMIN — LEVETIRACETAM 500 MG: 500 TABLET, FILM COATED ORAL at 21:05

## 2024-01-01 RX ADMIN — ACETAMINOPHEN 325 MG: 325 SUPPOSITORY RECTAL at 13:07

## 2024-01-01 RX ADMIN — MORPHINE SULFATE 2 MG: 2 INJECTION, SOLUTION INTRAMUSCULAR; INTRAVENOUS at 04:57

## 2024-01-01 RX ADMIN — QUETIAPINE FUMARATE 25 MG: 25 TABLET ORAL at 21:25

## 2024-01-01 RX ADMIN — MORPHINE SULFATE 2 MG: 2 INJECTION, SOLUTION INTRAMUSCULAR; INTRAVENOUS at 12:11

## 2024-01-01 RX ADMIN — GLYCOPYRROLATE 0.1 MG: 0.2 INJECTION, SOLUTION INTRAMUSCULAR; INTRAVENOUS at 11:15

## 2024-01-01 RX ADMIN — ACETAMINOPHEN 325 MG: 325 SUPPOSITORY RECTAL at 20:37

## 2024-01-01 RX ADMIN — LORAZEPAM 1 MG: 2 INJECTION INTRAMUSCULAR; INTRAVENOUS at 16:03

## 2024-01-01 RX ADMIN — BACITRACIN ZINC 1 LARGE APPLICATION: 500 OINTMENT TOPICAL at 11:20

## 2024-01-01 RX ADMIN — MORPHINE SULFATE 2 MG: 2 INJECTION, SOLUTION INTRAMUSCULAR; INTRAVENOUS at 14:14

## 2024-01-01 RX ADMIN — SCOPALAMINE 1 PATCH: 1 PATCH, EXTENDED RELEASE TRANSDERMAL at 17:26

## 2024-01-01 RX ADMIN — ESCITALOPRAM OXALATE 20 MG: 10 TABLET ORAL at 10:00

## 2024-01-01 RX ADMIN — SCOPALAMINE 1 PATCH: 1 PATCH, EXTENDED RELEASE TRANSDERMAL at 17:09

## 2024-01-01 RX ADMIN — Medication 3 MG: at 21:25

## 2024-01-01 RX ADMIN — ACETAMINOPHEN 325 MG: 325 SUPPOSITORY RECTAL at 07:35

## 2024-01-01 RX ADMIN — TETANUS TOXOID, REDUCED DIPHTHERIA TOXOID AND ACELLULAR PERTUSSIS VACCINE, ADSORBED 0.5 ML: 5; 2.5; 8; 8; 2.5 SUSPENSION INTRAMUSCULAR at 11:17

## 2024-01-01 RX ADMIN — MORPHINE SULFATE 2 MG: 2 INJECTION, SOLUTION INTRAMUSCULAR; INTRAVENOUS at 03:16

## 2024-01-01 RX ADMIN — ESCITALOPRAM OXALATE 20 MG: 10 TABLET ORAL at 08:46

## 2024-01-01 RX ADMIN — Medication 1 MG/HR: at 15:30

## 2024-03-13 ENCOUNTER — TELEPHONE (OUTPATIENT)
Dept: OTHER | Facility: OTHER | Age: 84
End: 2024-03-13

## 2024-03-13 DIAGNOSIS — D35.3 BENIGN NEOPLASM OF PITUITARY GLAND AND CRANIOPHARYNGEAL DUCT (HCC): ICD-10-CM

## 2024-03-13 DIAGNOSIS — R74.8 ABNORMAL LEVELS OF OTHER SERUM ENZYMES: ICD-10-CM

## 2024-03-13 DIAGNOSIS — F03.90 DEMENTIA, UNSPECIFIED DEMENTIA SEVERITY, UNSPECIFIED DEMENTIA TYPE, UNSPECIFIED WHETHER BEHAVIORAL, PSYCHOTIC, OR MOOD DISTURBANCE OR ANXIETY (HCC): Primary | ICD-10-CM

## 2024-03-13 DIAGNOSIS — D35.2 BENIGN NEOPLASM OF PITUITARY GLAND AND CRANIOPHARYNGEAL DUCT (HCC): ICD-10-CM

## 2024-03-13 DIAGNOSIS — F09 MILD COGNITIVE DISORDER: ICD-10-CM

## 2024-03-13 DIAGNOSIS — I05.1 RHEUMATIC MITRAL REGURGITATION: ICD-10-CM

## 2024-03-13 DIAGNOSIS — I35.0 MILD AORTIC STENOSIS: ICD-10-CM

## 2024-03-13 DIAGNOSIS — D44.3 NEOPLASM OF UNCERTAIN BEHAVIOR OF PITUITARY GLAND (HCC): ICD-10-CM

## 2024-03-13 NOTE — TELEPHONE ENCOUNTER
Pete's anxiety is getting worse especially at night, can you increase the dose of lexapro.  She sometimes gives him 2 tablets. Also could you check for vitamin b deficiency she heard that could cause dementia.  Stacey Segal

## 2024-03-13 NOTE — TELEPHONE ENCOUNTER
Patient's wife Vida called wanting to speak to Dr. Lombardi regarding the patient. Her best callback number is [phone].

## 2024-03-13 NOTE — TELEPHONE ENCOUNTER
I will increase the lexapro to 20 mg daily - do not give extra dose of this pill.  I will draw labs for b12, b6 and thyroid

## 2024-03-18 LAB
TSH SERPL DL<=0.005 MIU/L-ACNC: 4.15 UIU/ML (ref 0.45–4.5)
VIT B12 SERPL-MCNC: 754 PG/ML (ref 232–1245)
VIT B6 SERPL-MCNC: 35.9 UG/L (ref 3.4–65.2)

## 2024-03-22 ENCOUNTER — TELEPHONE (OUTPATIENT)
Age: 84
End: 2024-03-22

## 2024-03-22 DIAGNOSIS — F41.9 ANXIETY: ICD-10-CM

## 2024-03-22 RX ORDER — ESCITALOPRAM OXALATE 20 MG/1
20 TABLET ORAL DAILY
Qty: 90 TABLET | Refills: 1 | Status: SHIPPED | OUTPATIENT
Start: 2024-03-22 | End: 2024-09-18

## 2024-03-22 NOTE — TELEPHONE ENCOUNTER
Mrs Zaldivar is aware of Dr Lombardi's message. She would like a copy of his lab work results mailed to their home. Mailed labs as requested. No further action required Xiang

## 2024-03-22 NOTE — TELEPHONE ENCOUNTER
Hailey - patients wife    Dr.Lombardi    Per patients wife, patient was to get a new script with a new dose change sent over to the pharmacy this week, nothing is at the pharmacy.    Please send over: escitalopram (Lexapro) 20 mg tablet     Patients wife is also asking for the blood work results    She is asking for someone to call her today.    CB: [phone]      [id number] - [place] [id number]  Phone: [phone]  Fax: [phone]

## 2024-04-03 ENCOUNTER — TELEPHONE (OUTPATIENT)
Age: 84
End: 2024-04-03

## 2024-05-08 ENCOUNTER — RA CDI HCC (OUTPATIENT)
Dept: OTHER | Facility: HOSPITAL | Age: 84
End: 2024-05-08

## 2024-05-14 ENCOUNTER — APPOINTMENT (OUTPATIENT)
Dept: RADIOLOGY | Facility: CLINIC | Age: 84
End: 2024-05-14
Payer: COMMERCIAL

## 2024-05-14 ENCOUNTER — TELEPHONE (OUTPATIENT)
Age: 84
End: 2024-05-14

## 2024-05-14 ENCOUNTER — OFFICE VISIT (OUTPATIENT)
Dept: FAMILY MEDICINE CLINIC | Facility: CLINIC | Age: 84
End: 2024-05-14
Payer: COMMERCIAL

## 2024-05-14 VITALS
RESPIRATION RATE: 18 BRPM | HEIGHT: 64 IN | SYSTOLIC BLOOD PRESSURE: 118 MMHG | BODY MASS INDEX: 26.29 KG/M2 | DIASTOLIC BLOOD PRESSURE: 70 MMHG | WEIGHT: 154 LBS | HEART RATE: 64 BPM | TEMPERATURE: 97.3 F
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
## 2024-05-14 DIAGNOSIS — C67.9 MALIGNANT NEOPLASM OF URINARY BLADDER, UNSPECIFIED SITE (HCC): ICD-10-CM

## 2024-05-14 DIAGNOSIS — M79.644 FINGER PAIN, RIGHT: ICD-10-CM

## 2024-05-14 DIAGNOSIS — F09 MILD COGNITIVE DISORDER: ICD-10-CM

## 2024-05-14 DIAGNOSIS — E78.2 MIXED HYPERLIPIDEMIA: Primary | ICD-10-CM

## 2024-05-14 DIAGNOSIS — D69.6 THROMBOCYTOPENIA (HCC): ICD-10-CM

## 2024-05-14 DIAGNOSIS — N18.31 STAGE 3A CHRONIC KIDNEY DISEASE (HCC): ICD-10-CM

## 2024-05-14 DIAGNOSIS — F41.9 ANXIETY: ICD-10-CM

## 2024-05-14 PROCEDURE — G2211 COMPLEX E/M VISIT ADD ON: HCPCS | Performed by: FAMILY MEDICINE

## 2024-05-14 PROCEDURE — 73140 X-RAY EXAM OF FINGER(S): CPT

## 2024-05-14 PROCEDURE — 99214 OFFICE O/P EST MOD 30 MIN: CPT | Performed by: FAMILY MEDICINE

## 2024-05-14 RX ORDER — QUETIAPINE FUMARATE 25 MG/1
25 TABLET, FILM COATED ORAL
Qty: 30 TABLET | Refills: 0 | Status: SHIPPED | OUTPATIENT
Start: 2024-05-14 | End: 2024-06-13

## 2024-05-14 NOTE — PROGRESS NOTES
Assessment/Plan:    1. Mixed hyperlipidemia  -     CBC; Future  -     Comprehensive metabolic panel; Future; Expected date: 10/26/2024  -     Lipid Panel with Direct LDL reflex; Future; Expected date: 10/26/2024  -     Iron Panel (Includes Ferritin, Iron Sat%, Iron, and TIBC); Future    2. Anxiety    3. Stage 3a chronic kidney disease (HCC)  -     CBC; Future  -     Comprehensive metabolic panel; Future; Expected date: 10/26/2024  -     Lipid Panel with Direct LDL reflex; Future; Expected date: 10/26/2024  -     Iron Panel (Includes Ferritin, Iron Sat%, Iron, and TIBC); Future    4. Mild cognitive disorder  -     QUEtiapine (SEROquel) 25 mg tablet; Take 1 tablet (25 mg total) by mouth daily at bedtime    5. Malignant neoplasm of urinary bladder, unspecified site (HCC)    6. Thrombocytopenia (HCC)  -     CBC; Future  -     Comprehensive metabolic panel; Future; Expected date: 10/26/2024  -     Lipid Panel with Direct LDL reflex; Future; Expected date: 10/26/2024  -     Iron Panel (Includes Ferritin, Iron Sat%, Iron, and TIBC); Future    7. Fall, initial encounter    8. Finger pain, right  -     XR finger right fourth digit-ring; Future; Expected date: 05/14/2024    I will reorder labs that were not done for follow up  Will trial seroquel for benhavioral issues    Will get x ray rt hand to access for fracture    There are no Patient Instructions on file for this visit.    Return in about 6 months (around 11/14/2024) for Recheck.    Subjective:      Patient ID: Rohith Zaldivar is a 84 y.o. male.    Chief Complaint   Patient presents with   • Follow-up     6 month follow-up   HK CMA        Pt is sched for a 6 month follow up  Pt states he is doing well    Prevagen did not work for memory  Wife is gioving him arianna mind that seems to help  Pt is taking b6 as well    After supper pt wants to levae the hose and wants to go home.  During day he is fine, he still has no memeory but he does try to leave the house.  Wife is  "wondering how he can be calmed down at night  Wife is going to take him back to neurology    B12,b6 and thyroid were drawn but the labs I ordered from our previous appt were not done    At end of appy wife reports that the pt fell two days ago at his daughters house - missed stair, felled on carpeted wood floor.  Ring finger rty hand. Has pain    Wife reports the anxiety meds are working for him        The following portions of the patient's history were reviewed and updated as appropriate: allergies, current medications, past family history, past medical history, past social history, past surgical history and problem list.    Review of Systems   Musculoskeletal:  Positive for arthralgias and joint swelling.   Psychiatric/Behavioral:  Positive for decreased concentration and dysphoric mood.         Poor memory         Current Outpatient Medications   Medication Sig Dispense Refill   • Bacillus Coagulans-Inulin (Probiotic) 1-250 BILLION-MG CAPS      • Bioflavonoid Products (SYMONE-C/BIOFLAVONOIDS PO)      • DHEA 25 MG CAPS      • escitalopram (Lexapro) 20 mg tablet Take 1 tablet (20 mg total) by mouth daily 90 tablet 1   • GINKGO BILOBA COMPLEX PO      • Magnesium Gluconate 550 MG TABS      • methylPREDNISolone 4 MG tablet therapy pack Use as directed on package 21 each 0   • Multiple Vitamins-Minerals (ZINC PO)      • Omega-3 Fatty Acids (OMEGA 3 PO)      • QUEtiapine (SEROquel) 25 mg tablet Take 1 tablet (25 mg total) by mouth daily at bedtime 30 tablet 0     No current facility-administered medications for this visit.       Objective:    /70   Pulse 64   Temp (!) 97.3 °F (36.3 °C)   Resp 18   Ht 5' 4\" (1.626 m)   Wt 69.9 kg (154 lb)   BMI 26.43 kg/m²        Physical Exam  Vitals and nursing note reviewed.   Constitutional:       General: He is not in acute distress.     Appearance: He is well-developed. He is not diaphoretic.   HENT:      Head: Normocephalic and atraumatic.      Right Ear: External ear " normal.      Left Ear: External ear normal.      Nose: Nose normal.      Mouth/Throat:      Pharynx: No oropharyngeal exudate.   Eyes:      General: No scleral icterus.        Right eye: No discharge.         Left eye: No discharge.      Pupils: Pupils are equal, round, and reactive to light.   Neck:      Thyroid: No thyromegaly.   Cardiovascular:      Rate and Rhythm: Normal rate.      Heart sounds: Normal heart sounds. No murmur heard.  Pulmonary:      Effort: Pulmonary effort is normal. No respiratory distress.      Breath sounds: Normal breath sounds. No wheezing.   Abdominal:      General: Bowel sounds are normal. There is no distension.      Palpations: Abdomen is soft. There is no mass.      Tenderness: There is no abdominal tenderness. There is no guarding or rebound.   Musculoskeletal:         General: Swelling present.   Skin:     General: Skin is warm and dry.      Findings: No erythema or rash.   Neurological:      Mental Status: He is alert.      Coordination: Coordination normal.      Deep Tendon Reflexes: Reflexes normal.   Psychiatric:         Mood and Affect: Mood is anxious. Affect is flat.         Cognition and Memory: Cognition is impaired. He exhibits impaired recent memory.                Frank Lombardi, DO

## 2024-05-14 NOTE — TELEPHONE ENCOUNTER
Patient requesting a call back to discuss test results.       Ordering Provider: Lombardi  Date Completed: 5/14/24    Lab []  MRI []  X-Ray [x]  CT []  Colonoscopy []  EGD []  Biopsy []  Other []

## 2024-05-14 NOTE — TELEPHONE ENCOUNTER
Patient had xray completed today. Results not final yet, please keep open to track.    Nafisa Forbes LPN

## 2024-05-15 ENCOUNTER — TELEPHONE (OUTPATIENT)
Dept: FAMILY MEDICINE CLINIC | Facility: CLINIC | Age: 84
End: 2024-05-15

## 2024-05-15 DIAGNOSIS — S62.91XA CLOSED FRACTURE OF RIGHT HAND, INITIAL ENCOUNTER: Primary | ICD-10-CM

## 2024-05-15 NOTE — TELEPHONE ENCOUNTER
Please call pt looks like the x ray has revealed a fracture.  With this and his age - I would tra tape at this point.  I will place an order for pt to see dedicated hand ortho but I do not think their will be more to do

## 2024-05-21 ENCOUNTER — OFFICE VISIT (OUTPATIENT)
Dept: OBGYN CLINIC | Facility: CLINIC | Age: 84
End: 2024-05-21
Payer: COMMERCIAL

## 2024-05-21 VITALS
SYSTOLIC BLOOD PRESSURE: 137 MMHG | WEIGHT: 154 LBS | HEIGHT: 64 IN | BODY MASS INDEX: 26.29 KG/M2 | HEART RATE: 58 BPM | DIASTOLIC BLOOD PRESSURE: 75 MMHG

## 2024-05-21 DIAGNOSIS — S62.91XA CLOSED FRACTURE OF RIGHT HAND, INITIAL ENCOUNTER: ICD-10-CM

## 2024-05-21 PROCEDURE — 99203 OFFICE O/P NEW LOW 30 MIN: CPT | Performed by: STUDENT IN AN ORGANIZED HEALTH CARE EDUCATION/TRAINING PROGRAM

## 2024-05-21 NOTE — PROGRESS NOTES
ASSESSMENT/PLAN:    Assessment:   Fracture right ring volar middle phalanx base DOI 2024    Plan:   Recommend tra taping for 3-4 weeks, tra straps provided today  Referral placed today for Rohith to initiate care with Occupational Therapy to address range of motion, stiffness of right hand  Follow up if symptoms worsen or fail to improve  OTC medication for pain    Follow Up:  PRN    To Do Next Visit:  Re-evaluation    _____________________________________________________  CHIEF COMPLAINT:  Chief Complaint   Patient presents with   • Right Hand - Fracture         SUBJECTIVE:  Rohith Zaldivar is a 84 y.o. male who presents with his wife for initial evaluation of right ring finger fracture sustained 2024 after a fall onto his outstretched hand. He notes immediate pain and swelling of the finger. His wife initially tried tra taping but this was not tolerated well by the patient and was discontinued. He describes constant 5/10 sharp pain volar PIP joint. Denies previous injury or surgery.     PAST MEDICAL HISTORY:  Past Medical History:   Diagnosis Date   • Benign neoplasm of pituitary gland (HCC)    • Organic impotence    • Pneumonia     of left lower lobe due to infectious organism, last assessed 3/22/16, resolved 16   • Transient cerebral ischemia        PAST SURGICAL HISTORY:  Past Surgical History:   Procedure Laterality Date   • BRAIN SURGERY     • CATARACT EXTRACTION Bilateral        FAMILY HISTORY:  Family History   Problem Relation Age of Onset   • No Known Problems Mother    • No Known Problems Father    • Mental illness Neg Hx        SOCIAL HISTORY:  Social History     Tobacco Use   • Smoking status: Former     Current packs/day: 0.00     Types: Cigarettes     Start date: 5/15/1958     Quit date: 1982     Years since quittin.0     Passive exposure: Never   • Smokeless tobacco: Never   Vaping Use   • Vaping status: Never Used   Substance Use Topics   • Alcohol use: Yes      "Comment: rare   • Drug use: Never       MEDICATIONS:    Current Outpatient Medications:   •  Bacillus Coagulans-Inulin (Probiotic) 1-250 BILLION-MG CAPS, , Disp: , Rfl:   •  Bioflavonoid Products (SYMONE-C/BIOFLAVONOIDS PO), , Disp: , Rfl:   •  DHEA 25 MG CAPS, , Disp: , Rfl:   •  escitalopram (Lexapro) 20 mg tablet, Take 1 tablet (20 mg total) by mouth daily, Disp: 90 tablet, Rfl: 1  •  GINKGO BILOBA COMPLEX PO, , Disp: , Rfl:   •  Magnesium Gluconate 550 MG TABS, , Disp: , Rfl:   •  methylPREDNISolone 4 MG tablet therapy pack, Use as directed on package, Disp: 21 each, Rfl: 0  •  Multiple Vitamins-Minerals (ZINC PO), , Disp: , Rfl:   •  Omega-3 Fatty Acids (OMEGA 3 PO), , Disp: , Rfl:   •  QUEtiapine (SEROquel) 25 mg tablet, Take 1 tablet (25 mg total) by mouth daily at bedtime, Disp: 30 tablet, Rfl: 0    ALLERGIES:  No Known Allergies    REVIEW OF SYSTEMS:  Pertinent items are noted in HPI.  A comprehensive review of systems was negative.    LABS:  HgA1c: No results found for: \"HGBA1C\"  BMP:   Lab Results   Component Value Date    GLUCOSE 93 07/25/2017    CALCIUM 9.2 07/25/2017     07/25/2017    K 4.4 10/26/2023    CO2 24 10/26/2023     10/26/2023    BUN 37 (H) 10/26/2023    CREATININE 1.50 (H) 10/26/2023         _____________________________________________________  PHYSICAL EXAMINATION:  Vital signs: /75   Pulse 58   Ht 5' 4\" (1.626 m)   Wt 69.9 kg (154 lb)   BMI 26.43 kg/m²   General: well developed and well nourished, alert, oriented times 3, and appears comfortable  Psychiatric: Normal  HEENT: Trachea Midline, No torticollis  Cardiovascular: No discernable arrhythmia  Pulmonary: No wheezing or stridor  Abdomen: No rebound or guarding  Extremities: No peripheral edema  Skin: No masses, erythema, lacerations, fluctation, ulcerations  Neurovascular: Sensation Intact to the Median, Ulnar, Radial Nerve, Motor Intact to the Median, Ulnar, Radial Nerve, and Pulses Intact    MUSCULOSKELETAL " EXAMINATION:  Right ring finger:  Mild diffuse swelling, no obvious deformity or ecchymosis  Mild tenderness at volar and dorsal PIP joint  PIP ROM 5-90 with pain at end ranges  FDS, FDP intact  Brisk capillary refill  Sensation intact to Ax/R/M/U nerve distributions    _____________________________________________________  STUDIES REVIEWED:  Images were reviewed in PACS by Dr. Batista and demonstrate: small avulsion fracture of right ring middle phalanx base      PROCEDURES PERFORMED:  Procedures  No Procedures performed today      Scribe Attestation    I,:  Dagmar Marquez am acting as a scribe while in the presence of the attending physician.:       I,:  Genet Stubbs MD personally performed the services described in this documentation    as scribed in my presence.:

## 2024-05-29 ENCOUNTER — EVALUATION (OUTPATIENT)
Dept: OCCUPATIONAL THERAPY | Facility: CLINIC | Age: 84
End: 2024-05-29
Payer: COMMERCIAL

## 2024-05-29 DIAGNOSIS — S62.91XA CLOSED FRACTURE OF RIGHT HAND, INITIAL ENCOUNTER: Primary | ICD-10-CM

## 2024-05-29 PROCEDURE — 97166 OT EVAL MOD COMPLEX 45 MIN: CPT

## 2024-05-29 NOTE — PROGRESS NOTES
OT Evaluation     Today's date: 2024  Patient name: Rohith Zaldivar  : 1940  MRN: [id number]  Referring provider: Genet Stubbs MD  Dx:   Encounter Diagnosis     ICD-10-CM    1. Closed fracture of right hand, initial encounter  S62.91XA Ambulatory referral to PT/OT hand therapy                     Assessment  Impairments: abnormal or restricted ROM, impaired physical strength, lacks appropriate home exercise program and pain with function    Assessment details: Patient is a 84 y.o. RHD male who presents for OT IE and treatment for right ring finger volar plate injury with fracture to phalanx. Patient reports falling on mothers day 24 causing the injury. Patient referred by Dr. Stubbs to initiate treatment including hand therapy.    Understanding of Dx/Px/POC: good     Prognosis: good    Goals  Short term goals 2-4 weeks  Establish HEP to enhance performance with ADLs.    Improve active range of motion of RUE by 20  to assist with UE dressing.       Long Term goals by discharge  Establish final home exercise program to enhance maximal functional level with ADLs.    Achieve functional active range of motion of RUE for full return to household chores.       Achieve functional strength of RUE for full return to high level ADLs.          Plan  Patient would benefit from: OT eval, skilled occupational therapy, orthotics and custom splinting  Planned modality interventions: thermotherapy: hydrocollator packs, cryotherapy, TENS, unattended electrical stimulation, ultrasound and electrical stimulation/Russian stimulation    Planned therapy interventions: manual therapy, joint mobilization, strengthening, stretching, therapeutic activities, therapeutic exercise, home exercise program, graded exercise, graded activity, functional ROM exercises, flexibility, orthotic fitting/training, IASTM and kinesiology taping    Frequency: 1x week  Duration in weeks: 6  Treatment plan discussed with:  patient        Subjective Evaluation    History of Present Illness  Date of onset: 2024  Mechanism of injury: Patient is a 84 y.o. RHD male who presents for OT IE and treatment for right ring finger volar plate injury with fracture to phalanx. Patient reports falling on mothers day 24 causing the injury. Patient referred by Dr. Stubbs to initiate treatment including hand therapy.    Pain  Current pain ratin  At best pain ratin  At worst pain ratin  Quality: dull ache          Objective     Active Range of Motion     Left Digits    Flexion   Ring     MCP: 80    PIP: 99    DIP: 67  Extension   Ring     MCP: 0    PIP: -2    DIP: 0    Right Digits   Flexion   Ring     MCP: 70    PIP: 92    DIP: 62  Extension   Ring     MCP: -5    PIP: -25    DIP: 0               Auth Tracker  Auth Status Total   Visits  Expiration date Co-Insurance   pending                                  Visit Tracker  Date                                                                                       PMHx:   has a past medical history of Benign neoplasm of pituitary gland (HCC), Organic impotence, Pneumonia, and Transient cerebral ischemia.    Precautions:        Manuals HEP 2024                       Ther Ex     Education on HEP and dx  x5min   AROM tendon glides x x10   AROM table top extension x x10   AROM digit add/abduction x x10             Prayer stretch x x3 10 sec             Ther Act                     Modalities     MHP  5 min           Assessment:   Patient tolerated session well. Patient demonstrates ROM deficits upon assessment today. Patient session focused on patient education on anatomy and physiology concerning current dx, techniques for decreasing deficits through HEP, and appropriate use of modalities. Patient educated on HEP to include modalities and ROM TE  with verbal instructions and handouts for patient reference. Patient educated on treatment plan at this time. Patient benefiting from  skilled hand therapy OT to reduce deficits to improve independence with daily activities      Plan:   Focus on ROM to improve ability to complete daily activites with ease.

## 2024-06-04 ENCOUNTER — NURSE TRIAGE (OUTPATIENT)
Age: 84
End: 2024-06-04

## 2024-06-04 ENCOUNTER — TELEPHONE (OUTPATIENT)
Age: 84
End: 2024-06-04

## 2024-06-04 DIAGNOSIS — F09 MILD COGNITIVE DISORDER: ICD-10-CM

## 2024-06-04 NOTE — TELEPHONE ENCOUNTER
Patients wife was transferred over to the derm pod from the Rx line, after speaking with the patients wife she has been trying to reach the PCP, was going to give her the number and she hung up.

## 2024-06-05 RX ORDER — QUETIAPINE FUMARATE 25 MG/1
25 TABLET, FILM COATED ORAL
Qty: 30 TABLET | Refills: 0 | Status: SHIPPED | OUTPATIENT
Start: 2024-06-05 | End: 2024-07-05

## 2024-06-06 DIAGNOSIS — F41.9 ANXIETY: ICD-10-CM

## 2024-06-06 RX ORDER — ESCITALOPRAM OXALATE 10 MG/1
10 TABLET ORAL DAILY
Qty: 90 TABLET | Refills: 1 | Status: SHIPPED | OUTPATIENT
Start: 2024-06-06

## 2024-06-12 PROBLEM — S06.5XAA SUBDURAL HEMATOMA (HCC): Status: ACTIVE | Noted: 2024-06-12

## 2024-06-12 NOTE — TELEMEDICINE
"Neurosurgery e-Consult ([facility])     Rohith Zaldivar 84 y.o. male MRN: [id number]  Unit/Bed#: ED 13 Encounter: [id number]    Contacted by ED provider Dr. Sheba Mckee  Reason for consult: CTH findings s/p fall    Patient is an 84-year-old male with h/o pituitary adenoma s/p transnasal resection by Dr. Whitney in [place] > 10 years ago, HTN, bladder cancer s/p surgical resection, hyponatremia who has had forgetfulness for the past month and sustained a fall from the couch this morning. When EMT arrived, wife Vida witnessed left-sided \"body twitching\" with staring, concerning for seizure.     Available past medical history, social history, surgical history, medication list, drug allergies and review of systems were reviewed.    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    Not taking any AC/AP in an outpatient setting.     Labs reviewed, remarkable for Na 132.     Assessment and Recommendations    I had an extensive discussion over the phone with the family (wife, daughter, son-in-law) in [place] [facility].     I explained the CTH findings, natural progression of large acute on chronic subdural hemorrhage causing mass effect and shift, and potential therapeutic options including invasive brain surgery (craniotomy) for evacuation of SDH.      I extensively explained the goal of surgery (mainly life saving), expected postoperative recovery, potential risks and complications including but not limited to hemorrhage, stroke, seizure, new neurologic deficits, CSF leak, infection, redo surgery, respiratory issues, multiorgan failure especially given advanced age.    Patient reports that he previously clearly defined his requests for no invasive measures, including DNR (they " will bring the document from home).  They request at this time for comfort care measures only.    I updated and discussed with ED physician as well as the trauma attending on-call Dr. Lowe.  We all agree to keep the patient at [place] to continue medical management and comfort measures at this time.    I addressed the family's questions and concerns in detail over the phone.    All questions and concerns were addressed. Provider is in agreement with the course of action.     31+ minutes were spent in clinical decision making and direct communication, >50% of the total time devoted to medical consultative verbal/EMR discussion between providers.      Tawanda Osman M.D.  Neurosurgeon On Call

## 2024-06-12 NOTE — PLAN OF CARE

## 2024-06-12 NOTE — ED NOTES
Ring finger taken off the hand and given back to family as left hand swollen.     Zohreh Fierro RN  06/12/24 [id number]

## 2024-06-12 NOTE — ED PROVIDER NOTES
History  Chief Complaint   Patient presents with    Fall      Patient fell from the couch this morning.  Patient found by wife on the rug. After the fall he could not lift himself up .  Wife says he was a dead weight thus they called the ambulance.     84-year-old male presents after a fall.  Wife says she could not get him up so they called the squad he has some brush burns from the carpet on the left cheek.  Patient reports he did not lose consciousness no confusion or nausea vomiting neck pain he does have some left elbow abrasions.  Not on blood thinners.  He has a history of dementia.  Patient has some tremor on the left arm which also prompted him to come to the ED for evaluation.  Currently he is a slightly weaker on the left upper extremity and some tremors.      History provided by:  Patient   used: No        Prior to Admission Medications   Prescriptions Last Dose Informant Patient Reported? Taking?   Bacillus Coagulans-Inulin (Probiotic) 1-250 BILLION-MG CAPS 6/11/2024 Self Yes Yes   Bioflavonoid Products (SYMONE-C/BIOFLAVONOIDS PO) 6/11/2024 Self Yes Yes   DHEA 25 MG CAPS Not Taking Self Yes No   Patient not taking: Reported on 6/12/2024   GINKGO BILOBA COMPLEX PO Not Taking Self Yes No   Patient not taking: Reported on 6/12/2024   Magnesium Gluconate 550 MG TABS 6/11/2024 Self Yes Yes   Multiple Vitamins-Minerals (ZINC PO) 6/11/2024 Self Yes Yes   Omega-3 Fatty Acids (OMEGA 3 PO) 6/11/2024 Self Yes Yes   QUEtiapine (SEROquel) 25 mg tablet 6/11/2024  No Yes   Sig: Take 1 tablet (25 mg total) by mouth daily at bedtime   escitalopram (LEXAPRO) 10 mg tablet Not Taking  No No   Sig: TAKE ONE TABLET BY MOUTH EVERY DAY   Patient not taking: Reported on 6/12/2024   escitalopram (Lexapro) 20 mg tablet 6/11/2024  No Yes   Sig: Take 1 tablet (20 mg total) by mouth daily   methylPREDNISolone 4 MG tablet therapy pack Not Taking  No No   Sig: Use as directed on package   Patient not taking:  Reported on 2024      Facility-Administered Medications: None       Past Medical History:   Diagnosis Date    Benign neoplasm of pituitary gland (HCC)     Organic impotence     Pneumonia     of left lower lobe due to infectious organism, last assessed 3/22/16, resolved 16    Transient cerebral ischemia        Past Surgical History:   Procedure Laterality Date    BRAIN SURGERY      CATARACT EXTRACTION Bilateral        Family History   Problem Relation Age of Onset    No Known Problems Mother     No Known Problems Father     Mental illness Neg Hx      I have reviewed and agree with the history as documented.    E-Cigarette/Vaping    E-Cigarette Use Never User      E-Cigarette/Vaping Substances    Nicotine No     THC No     CBD No     Flavoring No     Other No     Unknown No      Social History     Tobacco Use    Smoking status: Former     Current packs/day: 0.00     Types: Cigarettes     Start date: 5/15/1958     Quit date: 1982     Years since quittin.1     Passive exposure: Never    Smokeless tobacco: Never   Vaping Use    Vaping status: Never Used   Substance Use Topics    Alcohol use: Yes     Comment: rare    Drug use: Never       Review of Systems   Constitutional: Negative.    HENT: Negative.     Eyes: Negative.    Respiratory: Negative.     Cardiovascular: Negative.    Gastrointestinal: Negative.    Endocrine: Negative.    Genitourinary: Negative.    Musculoskeletal: Negative.    Skin:  Positive for wound.   Allergic/Immunologic: Negative.    Neurological: Negative.    Hematological: Negative.    Psychiatric/Behavioral: Negative.     All other systems reviewed and are negative.      Physical Exam  Physical Exam  Vitals and nursing note reviewed.   Constitutional:       Appearance: Normal appearance.   HENT:      Head: Normocephalic and atraumatic.      Nose: Nose normal.      Mouth/Throat:      Mouth: Mucous membranes are moist.   Eyes:      Extraocular Movements: Extraocular movements  intact.      Pupils: Pupils are equal, round, and reactive to light.   Cardiovascular:      Rate and Rhythm: Normal rate and regular rhythm.   Pulmonary:      Effort: Pulmonary effort is normal.      Breath sounds: Normal breath sounds.   Abdominal:      General: Abdomen is flat. Bowel sounds are normal.      Palpations: Abdomen is soft.   Musculoskeletal:         General: Normal range of motion.      Cervical back: Normal range of motion and neck supple.   Skin:     General: Skin is warm.      Capillary Refill: Capillary refill takes less than 2 seconds.   Neurological:      General: No focal deficit present.      Mental Status: He is alert and oriented to person, place, and time. Mental status is at baseline.      Cranial Nerves: No cranial nerve deficit.      Sensory: No sensory deficit.      Coordination: Coordination normal.      Gait: Gait normal.      Deep Tendon Reflexes: Reflexes normal.   Psychiatric:         Mood and Affect: Mood normal.         Thought Content: Thought content normal.         Vital Signs  ED Triage Vitals   Temperature Pulse Respirations Blood Pressure SpO2   06/12/24 1028 06/12/24 1028 06/12/24 1028 06/12/24 1028 06/12/24 1028   98.1 °F (36.7 °C) 68 20 164/79 98 %      Temp Source Heart Rate Source Patient Position - Orthostatic VS BP Location FiO2 (%)   06/12/24 1028 06/12/24 1028 06/12/24 1028 06/12/24 1028 --   Temporal Monitor Lying Right arm       Pain Score       06/12/24 1442       No Pain           Vitals:    06/12/24 1345 06/12/24 1400 06/12/24 1410 06/12/24 1442   BP: 137/95   (!) 175/81   Pulse: 58 56 (!) 54 62   Patient Position - Orthostatic VS:    Lying         Visual Acuity  Visual Acuity      Flowsheet Row Most Recent Value   L Pupil Size (mm) 2   R Pupil Size (mm) 2   L Pupil Shape Round   R Pupil Shape Round            ED Medications  Medications   morphine injection 2 mg (has no administration in time range)   LORazepam (ATIVAN) injection 1 mg (has no administration  in time range)   escitalopram (LEXAPRO) tablet 20 mg (has no administration in time range)   QUEtiapine (SEROquel) tablet 25 mg (has no administration in time range)   tetanus-diphtheria-acellular pertussis (BOOSTRIX) IM injection 0.5 mL (0.5 mL Intramuscular Given 6/12/24 1117)   bacitracin topical ointment 1 large application (1 large application Topical Given 6/12/24 1120)       Diagnostic Studies  Results Reviewed       Procedure Component Value Units Date/Time    APTT [[id number]]  (Normal) Collected: 06/12/24 1234    Lab Status: Final result Specimen: Blood from Arm, Right Updated: 06/12/24 1315     PTT 27 seconds     Protime-INR [[id number]]  (Normal) Collected: 06/12/24 1234    Lab Status: Final result Specimen: Blood from Arm, Right Updated: 06/12/24 1315     Protime 14.4 seconds      INR 1.10    Comprehensive metabolic panel [[id number]]  (Abnormal) Collected: 06/12/24 1102    Lab Status: Final result Specimen: Blood from Arm, Right Updated: 06/12/24 1149     Sodium 132 mmol/L      Potassium 4.4 mmol/L      Chloride 107 mmol/L      CO2 28 mmol/L      ANION GAP -3 mmol/L      BUN 32 mg/dL      Creatinine 1.40 mg/dL      Glucose 108 mg/dL      Calcium 9.4 mg/dL      AST 32 U/L      ALT 21 U/L      Alkaline Phosphatase 78 U/L      Total Protein 7.5 g/dL      Albumin 4.2 g/dL      Total Bilirubin 0.67 mg/dL      eGFR 45 ml/min/1.73sq m     Narrative:      National Kidney Disease Foundation guidelines for Chronic Kidney Disease (CKD):     Stage 1 with normal or high GFR (GFR > 90 mL/min/1.73 square meters)    Stage 2 Mild CKD (GFR = 60-89 mL/min/1.73 square meters)    Stage 3A Moderate CKD (GFR = 45-59 mL/min/1.73 square meters)    Stage 3B Moderate CKD (GFR = 30-44 mL/min/1.73 square meters)    Stage 4 Severe CKD (GFR = 15-29 mL/min/1.73 square meters)    Stage 5 End Stage CKD (GFR <15 mL/min/1.73 square meters)  Note: GFR calculation is accurate only with a steady state creatinine    Magnesium [[id number]]   (Normal) Collected: 06/12/24 1102    Lab Status: Final result Specimen: Blood from Arm, Right Updated: 06/12/24 1149     Magnesium 2.1 mg/dL     CBC and differential [[id number]]  (Abnormal) Collected: 06/12/24 1102    Lab Status: Final result Specimen: Blood from Arm, Right Updated: 06/12/24 1114     WBC 6.96 Thousand/uL      RBC 4.15 Million/uL      Hemoglobin 12.2 g/dL      Hematocrit 37.1 %      MCV 89 fL      MCH 29.4 pg      MCHC 32.9 g/dL      RDW 12.6 %      MPV 10.6 fL      Platelets 162 Thousands/uL      nRBC 0 /100 WBCs      Segmented % 88 %      Immature Grans % 0 %      Lymphocytes % 6 %      Monocytes % 6 %      Eosinophils Relative 0 %      Basophils Relative 0 %      Absolute Neutrophils 6.03 Thousands/µL      Absolute Immature Grans 0.03 Thousand/uL      Absolute Lymphocytes 0.44 Thousands/µL      Absolute Monocytes 0.44 Thousand/µL      Eosinophils Absolute 0.00 Thousand/µL      Basophils Absolute 0.02 Thousands/µL     Fingerstick Glucose (POCT) [[id number]]  (Normal) Collected: 06/12/24 1044    Lab Status: Final result Specimen: Blood Updated: 06/12/24 1045     POC Glucose 112 mg/dl                    CT head without contrast   Final Result by Bonnie Cortez MD (06/12 1204)      Right greater than left acute mixed density bilateral cerebral convexity subdural hematomas with 0.8 cm leftward midline shift.            I personally discussed this study with KAY LY on 6/12/2024 11:55 AM.            Workstation performed: [id number]                    Procedures  Procedures         ED Course                               SBIRT 20yo+      Flowsheet Row Most Recent Value   Initial Alcohol Screen: US AUDIT-C     1. How often do you have a drink containing alcohol? 0 Filed at: 06/12/2024 1034   Audit-C Score 0 Filed at: 06/12/2024 1034   ARLET: How many times in the past year have you...    Used an illegal drug or used a prescription medication for non-medical reasons? Never Filed at: 06/12/2024 1034                       Medical Decision Making  Patient evaluated with labs imaging I discussed the CT head findings with the trauma service initially who wanted me to have goals of care discussion with the family as the patient has a history of dementia and given his age and following which to talk to neurosurgery.  When I spoke to the wife his daughter and the son-in-law at bedside they all wanted the patient to be comfortable that he would not want any aggressive measures.  I spoke to Dr. Osman, the neurosurgeon on-call who reviewed the case and spoke to the family for length of time.  She discussed the pros and cons of surgery and the family opted to make him comfort care and hospice evaluation at this time.  Decision made all around with all the healthcare providers involved including the neurosurgeon trauma surgeon that the patient would be admitted to the [facility] for palliative and comfort care measures and family is very comfortable with that plan it is as it is convenient for the wife as well    Problems Addressed:  Subdural hematoma (HCC): acute illness or injury    Amount and/or Complexity of Data Reviewed  External Data Reviewed: notes.  Labs: ordered. Decision-making details documented in ED Course.  Radiology: ordered. Decision-making details documented in ED Course.    Risk  OTC drugs.  Prescription drug management.  Decision regarding hospitalization.             Disposition  Final diagnoses:   Subdural hematoma (HCC)     Time reflects when diagnosis was documented in both MDM as applicable and the Disposition within this note       Time User Action Codes Description Comment    6/12/2024  1:20 PM Sheba Mckee Add [S06.5XAA] Subdural hematoma (HCC)           ED Disposition       ED Disposition   Admit    Condition   Stable    Date/Time   Wed Jun 12, 2024 1320    Comment                   Follow-up Information    None         Current Discharge Medication List        CONTINUE these medications which  have NOT CHANGED    Details   Bacillus Coagulans-Inulin (Probiotic) 1-250 BILLION-MG CAPS       Bioflavonoid Products (SYMONE-C/BIOFLAVONOIDS PO)       !! escitalopram (Lexapro) 20 mg tablet Take 1 tablet (20 mg total) by mouth daily  Qty: 90 tablet, Refills: 1    Associated Diagnoses: Anxiety      Magnesium Gluconate 550 MG TABS       Multiple Vitamins-Minerals (ZINC PO)       Omega-3 Fatty Acids (OMEGA 3 PO)       QUEtiapine (SEROquel) 25 mg tablet Take 1 tablet (25 mg total) by mouth daily at bedtime  Qty: 30 tablet, Refills: 0    Associated Diagnoses: Mild cognitive disorder      DHEA 25 MG CAPS       !! escitalopram (LEXAPRO) 10 mg tablet TAKE ONE TABLET BY MOUTH EVERY DAY  Qty: 90 tablet, Refills: 1    Associated Diagnoses: Anxiety      GINKGO BILOBA COMPLEX PO       methylPREDNISolone 4 MG tablet therapy pack Use as directed on package  Qty: 21 each, Refills: 0    Associated Diagnoses: Acute non-recurrent maxillary sinusitis       !! - Potential duplicate medications found. Please discuss with provider.          No discharge procedures on file.    PDMP Review       None            ED Provider  Electronically Signed by             Sheba Mckee DO  06/12/24 1329

## 2024-06-12 NOTE — ASSESSMENT & PLAN NOTE
Called patient and notified her of negative urine cx result and negative GC/Chlamydia test resutl. Swabone panel still pending  Pt took diflucan and states vaginal itching is resolved Continue lexapro and ativan

## 2024-06-12 NOTE — H&P
[facility]  H&P  Name: Rohith Zaldivar 84 y.o. male I MRN: [id number]  Unit/Bed#: [address] [place] [address] Date of Admission: 6/12/2024   Date of Service: 6/12/2024 I Hospital Day: 0      Assessment & Plan   * Subdural hematoma (HCC)  Assessment & Plan  84-year-old male with h/o pituitary adenoma s/p transnasal resection by Dr. Whitney in [place] > 10 years ago, HTN, bladder cancer s/p surgical resection, hyponatremia who has had forgetfulness for the past month and sustained a fall from the couch this morning     CT Head: Right greater than left acute mixed density bilateral cerebral convexity subdural hematomas with 0.8 cm leftward midline shift.     Neurosurgery was consulted and conversations were held with family regarding prognosis and treatment options  Ultimately patient and family decided to proceed with conservative management and being admitted under comfort measures with hospice consult via case management    PRN morphine and ativan  Delerium precautions    Anxiety  Assessment & Plan  Continue lexapro and ativan           VTE Pharmacologic Prophylaxis:   Moderate Risk (Score 3-4) - Pharmacological DVT Prophylaxis Contraindicated. Sequential Compression Devices Ordered.  Code Status: Level 4 - Comfort Care   Discussion with family: Updated  (wife and daughter) at bedside.    Anticipated Length of Stay: Patient will be admitted on an inpatient basis with an anticipated length of stay of greater than 2 midnights secondary to subdural hematoma.    Total Time Spent on Date of Encounter in care of patient: 50 mins. This time was spent on one or more of the following: performing physical exam; counseling and coordination of care; obtaining or reviewing history; documenting in the medical record; reviewing/ordering tests, medications or procedures; communicating with other healthcare professionals and discussing with patient's family/caregivers.    Chief Complaint: Fall at home    History of  Present Illness:  Rohith Zaldivar is a 84 y.o. male with a PMH of pituitary adenoma s/p transnasal resection by Dr. Whitney in [place] > 10 years ago, HTN, bladder cancer s/p surgical resection, hyponatremia who has had forgetfulness for the past month and sustained a fall from the couch this morning from couch. EMS was called and patient was noted to have left sided body twitching. Patient brought to ED and CT head showed: Right greater than left acute mixed density bilateral cerebral convexity subdural hematomas with 0.8 cm leftward midline shift.  Neurosurgery was consulted in the ER. Treatment options were discussed. Ultimately patient and family elected to proceed with conservative management and patient is being admitted as comfort measures with hospice consult via case management    Review of Systems:  Review of Systems   Constitutional:  Negative for chills and fever.   HENT:  Negative for ear pain and sore throat.    Eyes:  Negative for pain and visual disturbance.   Respiratory:  Negative for cough and shortness of breath.    Cardiovascular:  Negative for chest pain and palpitations.   Gastrointestinal:  Negative for abdominal pain and vomiting.   Genitourinary:  Negative for dysuria and hematuria.   Musculoskeletal:  Negative for arthralgias and back pain.   Skin:  Negative for color change and rash.   Neurological:  Positive for headaches. Negative for seizures and syncope.        Right sided muscle twitching   All other systems reviewed and are negative.      Past Medical and Surgical History:   Past Medical History:   Diagnosis Date    Benign neoplasm of pituitary gland (HCC)     Organic impotence     Pneumonia     of left lower lobe due to infectious organism, last assessed 3/22/16, resolved 12/6/16    Transient cerebral ischemia        Past Surgical History:   Procedure Laterality Date    BRAIN SURGERY      CATARACT EXTRACTION Bilateral        Meds/Allergies:  Prior to Admission medications    Medication Sig  Start Date End Date Taking? Authorizing Provider   Bacillus Coagulans-Inulin (Probiotic) 1-250 BILLION-MG CAPS    Yes Historical Provider, MD   Bioflavonoid Products (SYMONE-C/BIOFLAVONOIDS PO)    Yes Historical Provider, MD   escitalopram (Lexapro) 20 mg tablet Take 1 tablet (20 mg total) by mouth daily 3/22/24 9/18/24 Yes Frank Lombardi, DO   Magnesium Gluconate 550 MG TABS    Yes Historical Provider, MD   Multiple Vitamins-Minerals (ZINC PO)    Yes Historical Provider, MD   Omega-3 Fatty Acids (OMEGA 3 PO)    Yes Historical Provider, MD   QUEtiapine (SEROquel) 25 mg tablet Take 1 tablet (25 mg total) by mouth daily at bedtime 24 Yes Frank Lombardi, DO   DHEA 25 MG CAPS     Historical Provider, MD   escitalopram (LEXAPRO) 10 mg tablet TAKE ONE TABLET BY MOUTH EVERY DAY  Patient not taking: Reported on 2024   Frank Lombardi, DO   GINKGO BILOBA COMPLEX PO     Historical Provider, MD   methylPREDNISolone 4 MG tablet therapy pack Use as directed on package  Patient not taking: Reported on 23   Frank Lombardi, DO     I have reviewed home medications with patient family member.    Allergies: No Known Allergies    Social History:  Marital Status: /Civil Union     Patient Pre-hospital Living Situation: Home  Patient Pre-hospital Level of Mobility: unable to be assessed at time of evaluation  Patient Pre-hospital Diet Restrictions: none  Substance Use History:   Social History     Substance and Sexual Activity   Alcohol Use Yes    Comment: rare     Social History     Tobacco Use   Smoking Status Former    Current packs/day: 0.00    Types: Cigarettes    Start date: 5/15/1958    Quit date: 1982    Years since quittin.1    Passive exposure: Never   Smokeless Tobacco Never     Social History     Substance and Sexual Activity   Drug Use Never       Family History:  Family History   Problem Relation Age of Onset    No Known Problems Mother     No Known Problems Father      Mental illness Neg Hx        Physical Exam:     Vitals:   Blood Pressure: (!) 175/81 (06/12/24 1442)  Pulse: 62 (06/12/24 1442)  Temperature: 97.7 °F (36.5 °C) (06/12/24 1442)  Temp Source: Oral (06/12/24 1442)  Respirations: 19 (06/12/24 1410)  Weight - Scale: 70.9 kg (156 lb 4.9 oz) (06/12/24 1028)  SpO2: 100 % (06/12/24 1442)     Physical Exam  Vitals and nursing note reviewed.   Constitutional:       General: He is not in acute distress.     Appearance: He is well-developed.   HENT:      Head: Normocephalic and atraumatic.   Eyes:      Conjunctiva/sclera: Conjunctivae normal.   Cardiovascular:      Rate and Rhythm: Normal rate and regular rhythm.      Heart sounds: No murmur heard.  Pulmonary:      Effort: Pulmonary effort is normal. No respiratory distress.      Breath sounds: Clear bilaterally  Abdominal:        Palpations: Abdomen is soft.      Tenderness: There is no abdominal tenderness.   Musculoskeletal:         General: No swelling.      Cervical back: Neck supple.   Skin:     General: Skin is warm and dry.   Neurological:      Mental Status: He is alert and oriented to self. Mental status is at baseline +Myoclonus  Psychiatric:         Mood and Affect: Mood normal    Additional Data:     Lab Results:  Results from last 7 days   Lab Units 06/12/24  1102   WBC Thousand/uL 6.96   HEMOGLOBIN g/dL 12.2   HEMATOCRIT % 37.1   PLATELETS Thousands/uL 162   SEGS PCT % 88*   LYMPHO PCT % 6*   MONO PCT % 6   EOS PCT % 0     Results from last 7 days   Lab Units 06/12/24  1102   SODIUM mmol/L 132*   POTASSIUM mmol/L 4.4   CHLORIDE mmol/L 107   CO2 mmol/L 28   BUN mg/dL 32*   CREATININE mg/dL 1.40*   ANION GAP mmol/L -3*   CALCIUM mg/dL 9.4   ALBUMIN g/dL 4.2   TOTAL BILIRUBIN mg/dL 0.67   ALK PHOS U/L 78   ALT U/L 21   AST U/L 32   GLUCOSE RANDOM mg/dL 108     Results from last 7 days   Lab Units 06/12/24  1234   INR  1.10     Results from last 7 days   Lab Units 06/12/24  1044   POC GLUCOSE mg/dl 112     No results  "found for: \"HGBA1C\"        Lines/Drains:  Invasive Devices       Peripheral Intravenous Line  Duration             Peripheral IV 06/12/24 Right Antecubital <1 day    Peripheral IV 06/12/24 Right;Ventral (anterior) Hand <1 day                        Imaging:    CTA with extensive blood pressure protocol.  Once his blood pressure and cardiac schedule: He still has function  CT head without contrast   Final Result by Bonnie Cortez MD (06/12 1204)      Right greater than left acute mixed density bilateral cerebral convexity subdural hematomas with 0.8 cm leftward midline shift.            I personally discussed this study with KAY LY on 6/12/2024 11:55 AM.            Workstation performed: [id number]             EKG and Other Studies Reviewed on Admission:   EKG: Sinus Bradycardia. HR 56 with 1st degree av block.    ** Please Note: This note has been constructed using a voice recognition system. **    "

## 2024-06-12 NOTE — ED NOTES
Dr. Mckee at bedside giving update to daughter and wife regarding the results of the CT of the head     Halina Sandoval RN  06/12/24 [id number]

## 2024-06-12 NOTE — ASSESSMENT & PLAN NOTE
84-year-old male with h/o pituitary adenoma s/p transnasal resection by Dr. Whitney in [place] > 10 years ago, HTN, bladder cancer s/p surgical resection, hyponatremia who has had forgetfulness for the past month and sustained a fall from the couch this morning     CT Head: Right greater than left acute mixed density bilateral cerebral convexity subdural hematomas with 0.8 cm leftward midline shift.     Neurosurgery was consulted and conversations were held with family regarding prognosis and treatment options  Ultimately patient and family decided to proceed with conservative management and being admitted under comfort measures with hospice consult via case management    PRN morphine and ativan  Delerium precautions

## 2024-06-13 PROBLEM — G93.5 BRAIN COMPRESSION (HCC): Status: ACTIVE | Noted: 2024-01-01

## 2024-06-13 NOTE — PLAN OF CARE
Problem: INFECTION - ADULT  Goal: Absence or prevention of progression during hospitalization  Description: INTERVENTIONS:  - Assess and monitor for signs and symptoms of infection  - Monitor lab/diagnostic results  - Monitor all insertion sites, i.e. indwelling lines, tubes, and drains  - Monitor endotracheal if appropriate and nasal secretions for changes in amount and color  - [place] appropriate cooling/warming therapies per order  - Administer medications as ordered  - Instruct and encourage patient and family to use good hand hygiene technique  - Identify and instruct in appropriate isolation precautions for identified infection/condition  Outcome: Progressing     Problem: NEUROSENSORY - ADULT  Goal: Achieves stable or improved neurological status  Description: INTERVENTIONS  - Monitor and report changes in neurological status  - Monitor vital signs such as temperature, blood pressure, glucose, and any other labs ordered   - Initiate measures to prevent increased intracranial pressure  - Monitor for seizure activity and implement precautions if appropriate      Outcome: Progressing
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
## 2024-06-13 NOTE — ASSESSMENT & PLAN NOTE
Compression of brain due to SDH a/e/b abnormal CT brain showing midline shift treated with neurosurgery consult, comfort care.

## 2024-06-13 NOTE — PROGRESS NOTES
Spiritual Care Progress Note    2024  Patient: Rohith Zaldivar : 1940  Admission Date & Time: 2024 1024  MRN: [id number] [facility]: [id number]     visited patient per comfort care status. Patient's wife and pt's stepdaughter at bedside.  introduced self & role, explored support systems, normalized pt and family's experience, and provided emotional support. Patient is a member of [facility] in [place]. Pt's family described patient as good-spirited and sociable.  referred Fr. Wing to visit for sacramental needs. Pt and family expressed gratitude for support.  remains available.             Chaplaincy Interventions Utilized:   Empowerment: Normalized experience of patient/family, Provided chaplaincy education, and Provided grief counseling    Exploration: Explored emotional needs & resources, Explored spiritual needs & resources, and Facilitated story telling    Collaboration: Consulted with interdisciplinary team    Relationship Building: Cultivated a relationship of care and support and Listened empathically      Chaplaincy Outcomes Achieved:  Expressed gratitude, Identified priorities, Improved communication, and Spiritual resources utilized      Spiritual Coping Strategies Utilized:   Spiritual practices and Spiritual comfort     24 0900   Alevism Information   Pentecostalism Affiliation [facility]   Current Pentecostalism Involvement Patient active with [employer]   Spiritual Beliefs/Perceptions   Support Systems Spouse/significant other;Children   Stress Factors   Family Stress Factors Health changes   Coping Responses   Family Coping Open/discussion;Sadness   Plan of Care   Assessment Completed by: Unit visit

## 2024-06-13 NOTE — PROGRESS NOTES
[facility]  Progress Note  Name: Rohith Zaldivar I  MRN: [id number]  Unit/Bed#: 3 [address] I Date of Admission: 6/12/2024   Date of Service: 6/13/2024 I Hospital Day: 1    Assessment & Plan   * Subdural hematoma (HCC)  Assessment & Plan  84-year-old male with h/o pituitary adenoma s/p transnasal resection by Dr. Whitney in [place] > 10 years ago, HTN, bladder cancer s/p surgical resection, hyponatremia who has had forgetfulness for the past month and sustained a fall from the couch this morning     CT Head: Right greater than left acute mixed density bilateral cerebral convexity subdural hematomas with 0.8 cm leftward midline shift.     Neurosurgery was consulted and conversations were held with family regarding prognosis and treatment options  Ultimately patient and family decided to proceed with conservative management and being admitted under comfort measures with hospice consult via case management    PRN morphine and ativan  Delerium precautions  Hospice on board, currently no inpatient appropriate  Will re-evaluate tomorrow    Brain compression (HCC)  Assessment & Plan  Compression of brain due to SDH a/e/b abnormal CT brain showing midline shift treated with neurosurgery consult, comfort care.     Anxiety  Assessment & Plan  Continue lexapro and ativan               VTE Pharmacologic Prophylaxis:   Moderate Risk (Score 3-4) - Pharmacological DVT Prophylaxis Contraindicated. Sequential Compression Devices Ordered.    Mobility:   Basic Mobility Inpatient Raw Score: 12  JH-HLM Goal: 4: Move to chair/commode  JH-HLM Achieved: 4: Move to chair/commode  JH-HLM Goal achieved. Continue to encourage appropriate mobility.    Patient Centered Rounds: I performed bedside rounds with nursing staff today.   Discussions with Specialists or Other Care Team Provider: hospice liaison    Education and Discussions with Family / Patient: Updated  (wife and daughter) at bedside.    Total Time Spent  on Date of Encounter in care of patient: 35 mins. This time was spent on one or more of the following: performing physical exam; counseling and coordination of care; obtaining or reviewing history; documenting in the medical record; reviewing/ordering tests, medications or procedures; communicating with other healthcare professionals and discussing with patient's family/caregivers.    Current Length of Stay: 1 day(s)  Current Patient Status: Inpatient   Certification Statement: The patient will continue to require additional inpatient hospital stay due to pending safe dispo  Discharge Plan: Anticipate discharge in 24-48 hrs to hospice    Code Status: Level 4 - Comfort Care    Subjective:   Patient currently has no acute complaints states that he is feeling well    Objective:     Vitals:   Temp (24hrs), Av.3 °F (36.8 °C), Min:97.9 °F (36.6 °C), Max:98.5 °F (36.9 °C)    Temp:  [97.9 °F (36.6 °C)-98.5 °F (36.9 °C)] 98.5 °F (36.9 °C)  HR:  [64-87] 64  Resp:  [14-18] 14  BP: (131-145)/(71-83) 131/73  SpO2:  [96 %-98 %] 97 %  Body mass index is 26.83 kg/m².     Input and Output Summary (last 24 hours):     Intake/Output Summary (Last 24 hours) at 2024 1442  Last data filed at 2024 0900  Gross per 24 hour   Intake 240 ml   Output --   Net 240 ml       Physical Exam:   Physical Exam  Vitals and nursing note reviewed.   Constitutional:       General: He is not in acute distress.     Appearance: He is well-developed.   HENT:      Head: Normocephalic and atraumatic.   Eyes:      Conjunctiva/sclera: Conjunctivae normal.   Cardiovascular:      Rate and Rhythm: Normal rate and regular rhythm.      Heart sounds: No murmur heard.  Pulmonary:      Effort: Pulmonary effort is normal. No respiratory distress.      Breath sounds: Normal breath sounds.   Abdominal:      Palpations: Abdomen is soft.      Tenderness: There is no abdominal tenderness.   Musculoskeletal:         General: No swelling.      Cervical back: Neck  supple.   Skin:     General: Skin is warm and dry.   Neurological:      Mental Status: He is alert. Mental status is at baseline. He is disoriented.   Psychiatric:         Mood and Affect: Mood normal.          Additional Data:     Labs:  Results from last 7 days   Lab Units 06/12/24  1102   WBC Thousand/uL 6.96   HEMOGLOBIN g/dL 12.2   HEMATOCRIT % 37.1   PLATELETS Thousands/uL 162   SEGS PCT % 88*   LYMPHO PCT % 6*   MONO PCT % 6   EOS PCT % 0     Results from last 7 days   Lab Units 06/12/24  1102   SODIUM mmol/L 132*   POTASSIUM mmol/L 4.4   CHLORIDE mmol/L 107   CO2 mmol/L 28   BUN mg/dL 32*   CREATININE mg/dL 1.40*   ANION GAP mmol/L -3*   CALCIUM mg/dL 9.4   ALBUMIN g/dL 4.2   TOTAL BILIRUBIN mg/dL 0.67   ALK PHOS U/L 78   ALT U/L 21   AST U/L 32   GLUCOSE RANDOM mg/dL 108     Results from last 7 days   Lab Units 06/12/24  1234   INR  1.10     Results from last 7 days   Lab Units 06/12/24  1044   POC GLUCOSE mg/dl 112               Lines/Drains:  Invasive Devices       Peripheral Intravenous Line  Duration             Peripheral IV 06/12/24 Right Antecubital 1 day    Peripheral IV 06/12/24 Right;Ventral (anterior) Hand 1 day                          Imaging: Reviewed radiology reports from this admission including: CT head    Recent Cultures (last 7 days):         Last 24 Hours Medication List:   Current Facility-Administered Medications   Medication Dose Route Frequency Provider Last Rate    escitalopram  20 mg Oral Daily Kailey Ahumada DO      LORazepam  1 mg Intravenous Q4H PRN Kailey Ahumada DO      melatonin  3 mg Oral HS Hattie Pacheco PA-C      morphine injection  2 mg Intravenous Q1H PRN Kailey Ahumada DO      QUEtiapine  25 mg Oral HS Kailey Ahumada DO          Today, Patient Was Seen By: Kailey Ahumada DO    **Please Note: This note may have been constructed using a voice recognition system.**

## 2024-06-13 NOTE — ASSESSMENT & PLAN NOTE
84-year-old male with h/o pituitary adenoma s/p transnasal resection by Dr. Whitney in [place] > 10 years ago, HTN, bladder cancer s/p surgical resection, hyponatremia who has had forgetfulness for the past month and sustained a fall from the couch this morning     CT Head: Right greater than left acute mixed density bilateral cerebral convexity subdural hematomas with 0.8 cm leftward midline shift.     Neurosurgery was consulted and conversations were held with family regarding prognosis and treatment options  Ultimately patient and family decided to proceed with conservative management and being admitted under comfort measures with hospice consult via case management    PRN morphine and ativan  Delerium precautions  Hospice on board, currently no inpatient appropriate  Will re-evaluate tomorrow

## 2024-06-13 NOTE — CASE MANAGEMENT
Case Management Discharge Planning Note    Patient name Rohith Zaldivar  Location 3 [place] [facility]/[address] [place] [facility]-* MRN [id number]  : 1940 Date 2024       Current Admission Date: 2024  Current Admission Diagnosis:Subdural hematoma (HCC)   Patient Active Problem List    Diagnosis Date Noted Date Diagnosed    Subdural hematoma (HCC) 2024     Anxiety 2023     Mild aortic stenosis 2023     Osteopenia 2022     Mild cognitive disorder 10/09/2019     Kidney disease, chronic, stage III (GFR 30-59 ml/min) (HCC) 2017     Mixed hyperlipidemia 2017     Bladder cancer (HCC) 2015     Thrombocytopenia (HCC) 2014     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       LOS (days): 1  Geometric Mean LOS (GMLOS) (days):   Days to GMLOS:     OBJECTIVE:  Risk of Unplanned Readmission Score: 12.46         Current admission status: Inpatient   Preferred Pharmacy:   [id number] - [address] [address]  Phone: [phone] Fax: [phone]    Primary Care Provider: Frank Lombardi, DO    Primary Insurance: A[employer] REP  Secondary Insurance:     DISCHARGE DETAILS:    Discharge planning discussed with:: patient's spouse  Freedom of Choice: Yes  Comments - Freedom of Choice: Choice is for hospice eval with [facility] hosp[facility] provider  CM contacted family/caregiver?: Yes  Were Treatment Team discharge recommendations reviewed with patient/caregiver?: Yes  Did patient/caregiver verbalize understanding of patient care needs?: Yes  Were patient/caregiver advised of the risks associated with not following Treatment Team discharge recommendations?: Yes    Contacts  Patient Contacts: Vida Zaldivar  Relationship to Patient:: Family  Contact Method: In Person  Reason/Outcome:  Emergency Contact, Discharge Planning    Requested Home Health Care         Is the patient interested in HHC at discharge?: No    DME Referral Provided  Referral made for DME?: No    Other Referral/Resources/Interventions Provided:  Interventions: Hospice  Referral Comments: Consult received for hospice eval. CM s/w wife who is requesting eval with [facility] hospice provider. Referral opened in imani Esqueda (Deirdre) made aware.    Would you like to participate in our [facility] service program?  : No - Declined    Treatment Team Recommendation: Hospice  Discharge Destination Plan:: Hospice

## 2024-06-13 NOTE — PLAN OF CARE
Problem: Potential for Falls  Goal: Patient will remain free of falls  Description: INTERVENTIONS:  - Educate patient/family on patient safety including physical limitations  - Instruct patient to call for assistance with activity   - Consult OT/PT to assist with strengthening/mobility   - Keep Call bell within reach  - Keep bed low and locked with side rails adjusted as appropriate  - Keep care items and personal belongings within reach  - Initiate and maintain comfort rounds  - Make Fall Risk Sign visible to staff  - Offer Toileting every 2 Hours, in advance of need  - Initiate/Maintain bed alarm  - Obtain necessary fall risk management equipment: yellow socks  - Apply yellow socks and bracelet for high fall risk patients  - Consider moving patient to room near nurses station  Outcome: Progressing     Problem: PAIN - ADULT  Goal: Verbalizes/displays adequate comfort level or baseline comfort level  Description: Interventions:  - Encourage patient to monitor pain and request assistance  - Assess pain using appropriate pain scale  - Administer analgesics based on type and severity of pain and evaluate response  - Implement non-pharmacological measures as appropriate and evaluate response  - Consider cultural and social influences on pain and pain management  - Notify physician/advanced practitioner if interventions unsuccessful or patient reports new pain  Outcome: Progressing     Problem: SAFETY ADULT  Goal: Patient will remain free of falls  Description: INTERVENTIONS:  - Educate patient/family on patient safety including physical limitations  - Instruct patient to call for assistance with activity   - Consult OT/PT to assist with strengthening/mobility   - Keep Call bell within reach  - Keep bed low and locked with side rails adjusted as appropriate  - Keep care items and personal belongings within reach  - Initiate and maintain comfort rounds  - Make Fall Risk Sign visible to staff  - Offer Toileting every 2  Hours, in advance of need  - Initiate/Maintain bed alarm  - Obtain necessary fall risk management equipment: yellow socks  - Apply yellow socks and bracelet for high fall risk patients  - Consider moving patient to room near nurses station  Outcome: Progressing  Goal: Maintain or return to baseline ADL function  Description: INTERVENTIONS:  -  Assess patient's ability to carry out ADLs; assess patient's baseline for ADL function and identify physical deficits which impact ability to perform ADLs (bathing, care of mouth/teeth, toileting, grooming, dressing, etc.)  - Assess/evaluate cause of self-care deficits   - Assess range of motion  - Assess patient's mobility; develop plan if impaired  - Assess patient's need for assistive devices and provide as appropriate  - Encourage maximum independence but intervene and supervise when necessary  - Involve family in performance of ADLs  - Assess for home care needs following discharge   - Consider OT consult to assist with ADL evaluation and planning for discharge  - Provide patient education as appropriate  Outcome: Progressing  Goal: Maintains/Returns to pre admission functional level  Description: INTERVENTIONS:  - Perform AM-PAC 6 Click Basic Mobility/ Daily Activity assessment daily.  - Set and communicate daily mobility goal to care team and patient/family/caregiver.   - Collaborate with rehabilitation services on mobility goals if consulted  - Perform Range of Motion 3 times a day.  - Reposition patient every 2 hours.  - Dangle patient 3 times a day  - Stand patient 3 times a day  - Ambulate patient 3 times a day  - Out of bed to chair 3 times a day   - Out of bed for meals 3 times a day  - Out of bed for toileting  - Record patient progress and toleration of activity level   Outcome: Progressing     Problem: Prexisting or High Potential for Compromised Skin Integrity  Goal: Skin integrity is maintained or improved  Description: INTERVENTIONS:  - Identify patients at  risk for skin breakdown  - Assess and monitor skin integrity  - Assess and monitor nutrition and hydration status  - Monitor labs   - Assess for incontinence   - Turn and reposition patient  - Assist with mobility/ambulation  - Relieve pressure over bony prominences  - Avoid friction and shearing  - Provide appropriate hygiene as needed including keeping skin clean and dry  - Evaluate need for skin moisturizer/barrier cream  - Collaborate with interdisciplinary team   - Patient/family teaching  - Consider wound care consult   Outcome: Progressing     Problem: NEUROSENSORY - ADULT  Goal: Achieves stable or improved neurological status  Description: INTERVENTIONS  - Monitor and report changes in neurological status  - Monitor vital signs such as temperature, blood pressure, glucose, and any other labs ordered   - Initiate measures to prevent increased intracranial pressure  - Monitor for seizure activity and implement precautions if appropriate      Outcome: Progressing  Goal: Remains free of injury related to seizures activity  Description: INTERVENTIONS  - Maintain airway, patient safety  and administer oxygen as ordered  - Monitor patient for seizure activity, document and report duration and description of seizure to physician/advanced practitioner  - If seizure occurs,  ensure patient safety during seizure  - Reorient patient post seizure  - Seizure pads on all 4 side rails  - Instruct patient/family to notify RN of any seizure activity including if an aura is experienced  - Instruct patient/family to call for assistance with activity based on nursing assessment  - Administer anti-seizure medications if ordered    Outcome: Progressing  Goal: Achieves maximal functionality and self care  Description: INTERVENTIONS  - Monitor swallowing and airway patency with patient fatigue and changes in neurological status  - Encourage and assist patient to increase activity and self care.   - Encourage visually impaired,  hearing impaired and aphasic patients to use assistive/communication devices  Outcome: Progressing

## 2024-06-14 ENCOUNTER — TELEPHONE (OUTPATIENT)
Dept: NEUROSURGERY | Facility: CLINIC | Age: 84
End: 2024-06-14

## 2024-06-14 ENCOUNTER — TELEPHONE (OUTPATIENT)
Age: 84
End: 2024-06-14

## 2024-06-14 PROBLEM — Z71.89 GOALS OF CARE, COUNSELING/DISCUSSION: Status: ACTIVE | Noted: 2024-06-14

## 2024-06-14 PROBLEM — R56.9 SEIZURE (HCC): Status: ACTIVE | Noted: 2024-01-01

## 2024-06-14 NOTE — UTILIZATION REVIEW
SEE INITIAL REVIEW AT BOTTOM    Continued Stay Review    Date: 6/14                          Current Patient Class: Inpatient  Current Level of Care: Med Surg    HPI:84 y.o. male initially admitted on  6/12    Assessment/Plan: Date: 6/14  Day 3: Has surpassed a 2nd midnight with active treatments and services.  Telemed Neurosurgery; Had an extensive discussion, explained the goal of surgery (mainly life saving), expected postoperative recovery, potential risks and complications.  After this extensive discussion, family requested comfort care measures only. Recommended patient remain at [facility] for these comfort care measures.   Strongly recommend continuing Keppra.  Recent CTH findings are still significant, and he is still at risk for experiencing additional acute neurologic decline and/or further seizure activity.      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       Vital Signs (last 3 days)       Date/Time Temp Pulse Resp BP MAP (mmHg) SpO2 Calculated FIO2 (%) - Nasal Cannula Nasal Cannula O2 Flow Rate (L/min) O2 Device Patient Position - Orthostatic VS [place] Coma Scale Score Pain    06/13/24 2013 98 °F (36.7 °C) 65 18 139/71 99 95 % -- -- None (Room air) Lying -- No Pain    06/13/24 1500 -- -- 16 126/77 -- -- -- -- -- -- -- --    06/13/24 0900 -- -- -- -- -- -- -- -- -- -- -- No Pain    06/13/24 0800 -- -- -- -- -- -- -- -- -- -- 14 --    06/13/24 0700 -- 64 14 -- -- -- -- -- -- -- -- --    06/12/24 2238 98.5 °F (36.9 °C) 66 18 131/73 97 97 % -- -- -- Lying -- --    06/12/24 2100 -- -- -- -- -- -- -- -- -- -- 14 --    06/12/24 1913 97.9 °F (36.6 °C) 87 18 141/83 108 96 % -- -- -- Sitting -- No Pain    06/12/24 1632 98.4 °F (36.9 °C) 70 18 145/71 101 98 % -- -- -- Lying -- --    06/12/24 1442 97.7 °F (36.5 °C) 62 18 175/81 116 100 % -- -- Nasal cannula Lying -- No Pain    06/12/24 1410 -- 54 19 -- -- 98 % -- -- --  -- -- --    06/12/24 1400 -- 56 14 -- -- 98 % -- -- -- -- -- --    06/12/24 1345 -- 58 22 137/95 112 97 % -- -- -- -- -- --    06/12/24 1335 -- 60 24 -- -- 99 % 32 3 L/min Simple mask -- -- --    06/12/24 1315 -- 58 18 154/83 112 97 % -- -- -- -- -- --    06/12/24 1248 -- -- -- -- -- -- -- -- -- -- 14 --    06/12/24 1245 -- 60 26 -- -- 94 % -- -- -- -- -- --    06/12/24 1243 -- 66 18 -- -- 95 % -- -- -- -- -- --    06/12/24 1218 -- 64 18 159/75 109 96 % -- -- -- -- 14 --    06/12/24 1215 -- 64 22 184/86 121 96 % -- -- -- -- -- --    06/12/24 1154 -- 62 19 -- -- 97 % -- -- -- -- -- --    06/12/24 1143 -- -- -- -- -- -- -- -- -- -- 14 --    06/12/24 1139 -- -- -- 169/78 112 -- -- -- -- -- -- --    06/12/24 1102 -- -- -- -- -- -- -- -- -- -- 13 --    06/12/24 1028 98.1 °F (36.7 °C) 68 20 164/79 -- 98 % -- -- None (Room air) Lying -- --          Weight (last 2 days)       Date/Time Weight    06/12/24 1442 70.9 (156.31)    06/12/24 1028 70.9 (156.31)            Pertinent Labs/Diagnostic Results:   Radiology:  CT head without contrast   Final Interpretation by Bonnie Cortez MD (06/12 1204)      Right greater than left acute mixed density bilateral cerebral convexity subdural hematomas with 0.8 cm leftward midline shift.            I personally discussed this study with KAY LY on 6/12/2024 11:55 AM.            Workstation performed: [id number]           Cardiology:  ECG 12 lead   Final Result by Pete Stringer MD (06/12 6298)   Sinus bradycardia with 1st degree A-V block   Possible Anterior infarct , age undetermined   Abnormal ECG   When compared with ECG of 20-JUL-2015 13:30,   No significant change was found   Confirmed by Pete Stringer ([id number]) on 6/12/2024 12:07:22 PM        GI:  No orders to display           Results from last 7 days   Lab Units 06/12/24  1102   WBC Thousand/uL 6.96   HEMOGLOBIN g/dL 12.2   HEMATOCRIT % 37.1   PLATELETS Thousands/uL 162   TOTAL NEUT ABS Thousands/µL 6.03         Results from last 7  days   Lab Units 06/12/24  1102   SODIUM mmol/L 132*   POTASSIUM mmol/L 4.4   CHLORIDE mmol/L 107   CO2 mmol/L 28   ANION GAP mmol/L -3*   BUN mg/dL 32*   CREATININE mg/dL 1.40*   EGFR ml/min/1.73sq m 45   CALCIUM mg/dL 9.4   MAGNESIUM mg/dL 2.1     Results from last 7 days   Lab Units 06/12/24  1102   AST U/L 32   ALT U/L 21   ALK PHOS U/L 78   TOTAL PROTEIN g/dL 7.5   ALBUMIN g/dL 4.2   TOTAL BILIRUBIN mg/dL 0.67     Results from last 7 days   Lab Units 06/12/24  1044   POC GLUCOSE mg/dl 112     Results from last 7 days   Lab Units 06/12/24  1102   GLUCOSE RANDOM mg/dL 108       Results from last 7 days   Lab Units 06/12/24  1234   PROTIME seconds 14.4   INR  1.10   PTT seconds 27         Medications:   Scheduled Medications:  escitalopram, 20 mg, Oral, Daily  melatonin, 3 mg, Oral, HS  QUEtiapine, 25 mg, Oral, HS      Continuous IV Infusions: None     PRN Meds:  LORazepam, 1 mg, Intravenous, Q4H PRN  morphine injection, 2 mg, Intravenous, Q1H PRN      Discharge Plan: TBD    Network Utilization Review Department  ATTENTION: Please call with any questions or concerns to [phone] and carefully listen to the prompts so that you are directed to the right person. All voicemails are confidential.   For Discharge needs, contact Care Management DC Support Team at [phone] opt. [phone]  Send all requests for admission clinical reviews, approved or denied determinations and any other requests to dedicated fax number below belonging to the [facility] where the patient is receiving treatment. List of dedicated fax numbers for the Facilities:  FACILITY NAME UR FAX NUMBER   ADMISSION DENIALS (Administrative/Medical Necessity) [phone]   DISCHARGE SUPPORT TEAM (NETWORK) [phone]   PARENT CHILD HEALTH (Maternity/NICU/Pediatrics) [phone]   [facility] [phone]   [facility] [phone]   [facility] [phone]   [facility]  [facility] [phone]   [facility] [phone]   [facility] [phone]   [facility] [phone]   [facility] [phone]   [facility] [phone]   [facility] [phone]   [facility] [phone]   [facility] [phone]      Bright red stools

## 2024-06-14 NOTE — TELEPHONE ENCOUNTER
6/14/24 - PT IN [facility]  7/2/24 SNPX W/LH 2 HW F/U W/CTH PER [facility]  [place] contacted by provider at [place]. Requested 2 week follow up SNPX with LH and CT head. I will place CT order now

## 2024-06-14 NOTE — CASE MANAGEMENT
Case Management Assessment & Discharge Planning Note    Patient name Rohith Zaldivar  Location 3 [place] [facility] [place] [facility] MRN [id number]  : 1940 Date 2024       Current Admission Date: 2024  Current Admission Diagnosis:Subdural hematoma (HCC)   Patient Active Problem List    Diagnosis Date Noted Date Diagnosed    Brain compression (HCC) 2024     Subdural hematoma (HCC) 2024     Anxiety 2023     Mild aortic stenosis 2023     Osteopenia 2022     Mild cognitive disorder 10/09/2019     Kidney disease, chronic, stage III (GFR 30-59 ml/min) (HCC) 2017     Mixed hyperlipidemia 2017     Bladder cancer (HCC) 2015     Thrombocytopenia (HCC) 2014     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       LOS (days): 2  Geometric Mean LOS (GMLOS) (days): 4.7  Days to GMLOS:2.7     OBJECTIVE:    Risk of Unplanned Readmission Score: 12.55         Current admission status: Inpatient       Preferred Pharmacy:   [facility] [id number] - [place] [address]  Phone: [phone] Fax: [phone]    Primary Care Provider: Frank Lombardi, DO    Primary Insurance: AARP MC REP  Secondary Insurance:     ASSESSMENT:  Readmission Root Cause  30 Day Readmission: No    Patient Information  Admitted from:: Home  Mental Status: Confused  During Assessment patient was accompanied by: Spouse, Daughter, Son  Assessment information provided by:: Spouse, Daughter, Son  Primary Caregiver: Spouse  Support Systems: Spouse/significant other, Family members, Children  County of Residence: [place]  What [facility] do you live in?: [place]  Living Arrangements: Lives w/ Spouse/significant other  Is patient a ?: No    Activities of Daily Living Prior to  Admission  Functional Status: Assistance  Completes ADLs independently?: No  Level of ADL dependence: Assistance  Ambulates independently?: Yes  Does patient use assisted devices?: Yes  Assisted Devices (DME) used: Walker, Straight Cane  Does patient currently own DME?: Yes  What DME does the patient currently own?: Stair Chair/[employer], Straight Cane  Does patient have a history of Outpatient Therapy (PT/OT)?: No  Does the patient have a history of Short-Term Rehab?: No  Does patient have a history of HHC?: No  Does patient currently have HHC?: No    Patient Information Continued  Income Source: Pension/detention  Does patient have prescription coverage?: Yes  Does patient receive dialysis treatments?: No  Does patient have a history of substance abuse?: No  Does patient have a history of Mental Health Diagnosis?: No    PHQ 2/9 Screening   Reviewed PHQ 2/9 Depression Screening Score?: No    Means of Transportation  Means of Transport to Appts:: Family transport      Social Determinants of Health (SDOH)      Flowsheet Row Most Recent Value   Housing Stability    In the last 12 months, was there a time when you were not able to pay the mortgage or rent on time? N   In the past 12 months, how many times have you moved where you were living? 0   At any time in the past 12 months, were you homeless or living in a shelter (including now)? N   Transportation Needs    In the past 12 months, has lack of transportation kept you from medical appointments or from getting medications? no   In the past 12 months, has lack of transportation kept you from meetings, work, or from getting things needed for daily living? No   Food Insecurity    Within the past 12 months, you worried that your food would run out before you got the money to buy more. Never true   Within the past 12 months, the food you bought just didn't last and you didn't have money to get more. Never true   Utilities    In the past 12 months has the electric, gas,  oil, or water company threatened to shut off services in your home? No          DISCHARGE DETAILS:    Discharge planning discussed with:: Patient's spouse and children  Freedom of Choice: Yes     CM contacted family/caregiver?: Yes  Were Treatment Team discharge recommendations reviewed with patient/caregiver?: Yes  Did patient/caregiver verbalize understanding of patient care needs?: Yes  Were patient/caregiver advised of the risks associated with not following Treatment Team discharge recommendations?: Yes    Requested Home Health Care         Is the patient interested in HHC at discharge?: No    DME Referral Provided  Referral made for DME?: No    Other Referral/Resources/Interventions Provided:  Interventions: Hospice    Would you like to participate in our [facility] service program?  : No - Declined    Treatment Team Recommendation: Hospice  Discharge Destination Plan:: Hospice (SNF w/ hospice vs home with hospice TBD)  Transport at Discharge : BLS Ambulance     Additional Comments: . CM s/w patient's spouse and children bedside regarding DCP.     CM reviewed that per [facility] hospice eval, patient does not meet criteria for GIP hospice. CM reviewed that at this time consideration would need to be made for home with hospice or SNF placement with hospice with private pay room/board.     Family verbalized frustration. Family reports that  they were told patient  would be admitted here until he . CM reviewed that it appears conversations were had regarding plan to transfer to B for NeuroSx intervention vs admit to W under comfort care for hospice eval due to time of presentation to [facility] ED. CM reviewed that this CM is unable to give medical Dx or timeframe for treatment or prognosis.     Family stating that they would be able to make a decision for DCP (home vs SNF) if they were better able to understand timeframe/prognosis for patient. Family stating that if patient likely only has days left, they would  plan for SNF placement, if patient will likely live for months or more they would likely bring patient home with hospice and plan for SNF placement when they feel they could no longer care for patient.     Provider made aware of discussion.

## 2024-06-14 NOTE — TELEPHONE ENCOUNTER
Patient's wife called to let Dr. Lombardi know that her  fell and has been admitted to [facility].  He has a subdural hematoma and a blood clot.  She is asking if Dr. Lombardi can call her on her cell phone at [phone]

## 2024-06-14 NOTE — ASSESSMENT & PLAN NOTE
Compression of brain due to SDH a/e/b abnormal CT brain showing midline shift treated with neurosurgery consult, comfort care.   Comfort measures only

## 2024-06-14 NOTE — ASSESSMENT & PLAN NOTE
Started on Keppra 500 mg twice daily for seizure prophylaxis    EEG Interpretation: This Routine EEG recorded during wakefulness is abnormal. The tracing was limited due to excessive diffuse muscle artifact, but in visible sections of the tracing, background activities were mildly too slow suggesting mild diffuse cerebral dysfunction of non-specific etiology.   Keppra stopped given transition to comfort

## 2024-06-14 NOTE — ASSESSMENT & PLAN NOTE
Outpatient Physical Therapy Ortho Treatment Note  [facility]     Patient Name: Souleymane Stapleton  : 1944  MRN: [id number]  Today's Date: 2020      Visit Date: 2020  Attendance: 37/38 (Medical necessity)  Subjective Improvement: 80-85% (reduced improvement due to recent setback)  Next MD Appt: 7-7-20  Recert Date: 2020  Visit Dx:    ICD-10-CM ICD-9-CM   1. Rotator cuff arthropathy, right M12.811 716.81   2. Status post reverse total replacement of right shoulder Z96.611 V43.61       Patient Active Problem List   Diagnosis   • Astigmatism   • Anxiety state   • History of cerebrovascular accident   • Nuclear senile cataract   • Hypertension   • Unspecified hemorrhoids   • Palpitations   • Pure hypercholesterolemia   • Prostate cancer (CMS/HCC)   • Chronic right shoulder pain   • Rotator cuff syndrome, left   • Chronic left shoulder pain   • Impingement syndrome, shoulder, left   • SOB (shortness of breath)   • Angina of effort (CMS/HCC)   • Coronary artery disease involving coronary bypass graft of native heart without angina pectoris   • Cervical spinal stenosis   • Displacement of cervical intervertebral disc   • Displacement of lumbar intervertebral disc without myelopathy   • Follow-up examination after neurological surgery   • Internal carotid artery dissection (CMS/HCC)   • TIA (transient ischemic attack)   • Bacterial intestinal infection, unspecified   • Body mass index (bmi) 25.0-25.9, adult   • Diverticulosis of large intestine without perforation or abscess without bleeding   • Functional dyspepsia   • History of colonic polyps   • Polyp of stomach and duodenum   • Bilateral shoulder pain   • Rotator cuff arthropathy, right   • Status post reverse arthroplasty of right shoulder        Past Medical History:   Diagnosis Date   • Abdominal pain    • Abnormal weight loss    • Acid reflux    • Acute gastritis    • Anxiety state    • Arthritis    • Cancer (CMS/HCC)     Prostate  Continue comfort measures        • Coronary artery disease    • History of cerebrovascular accident    • History of colon polyps    • Hypertension    • Nausea    • Nuclear senile cataract    • Presbyopia    • Stroke (CMS/HCC) 2005    TIA   • Tobacco use    • Transient cerebral ischemia    • Vitreous detachment of left eye         Past Surgical History:   Procedure Laterality Date   • BACK SURGERY     • CARDIAC CATHETERIZATION N/A 6/19/2018    Procedure: Coronary angiography;  Surgeon: Delroy Mcconnell MD;  Location: [facility] CATH INVASIVE LOCATION;  Service: Cardiovascular   • COLONOSCOPY  06/09/2015    Diverticulosis found in the sigmoid colon. Hemorrhoids found in the anus.   • CORONARY ARTERY BYPASS GRAFT N/A 6/22/2018    Procedure: PARAS STERNOTOMY CORONARY ARTERY BYPASS GRAFT TIMES 5 USING RIGHT AND LEFT INTERNAL MAMMARY ARTERIES AND LEFT GREATER SAPHENOUS VEIN GRAFT PER ENDOSCOPIC VEIN HARVESTING AND PRP;  Surgeon: Edgar Pérez MD;  Location: [facility] OR;  Service: Cardiothoracic   • CRYOTHERAPY  08/14/2012    Of Acne   • ENDOSCOPY  09/01/2015    EGD w/ biopsy -Multiple polyps, one removed.   • ENDOSCOPY N/A 8/22/2019    Procedure: ESOPHAGOGASTRODUODENOSCOPY;  Surgeon: Chuck Rich DO;  Location: [facility] ENDOSCOPY;  Service: Gastroenterology   • HEMORRHOIDECTOMY N/A 3/20/2017    Procedure: Removal of Anal Papilla;  Surgeon: Juan Diego Ken MD;  Location: [facility] OR;  Service:    • INJECTION OF MEDICATION  09/14/2010    B12   • INJECTION OF MEDICATION  10/17/2011    Kenalog   • LUMBAR LAMINECTOMY  09/29/2010   • OTHER SURGICAL HISTORY  08/19/2010    Biopsy, Each Additional Lesion    • SHOULDER ROTATOR CUFF REPAIR Right 02/17/2020   • SKIN BIOPSY  08/19/2010   • TOTAL SHOULDER ARTHROPLASTY W/ DISTAL CLAVICLE EXCISION Right 2/17/2020    Procedure: right reverse total shoulder arthroplasty.;  Surgeon: Juan Diego Mendoza MD;  Location: [facility] OR;  Service: Orthopedics;  Laterality: Right;   • TOTAL SHOULDER REVERSE  "ARTHROPLASTY Right 02/17/2020       PT Ortho     Row Name 06/16/20 0800       Subjective Comments    Subjective Comments  Patient reports compliance with updated HEP. He has some difficulty performing sidelying ER. He is still being very cautious with right shoulder. He has had no significant incidences of sharp pain in last couple of days.   -      User Key  (r) = Recorded By, (t) = Taken By, (c) = Cosigned By    Initials Name Provider Type    Karen Knowles Physical Therapist                      PT Assessment/Plan     Row Name 06/16/20 0800          PT Assessment    Assessment Comments  Patient is making gradual strength gains. He still has sporadic intense pain primarily with adduction and IR activities.   -        PT Plan    PT Frequency  2x/week  -     PT Plan Comments  Focus on right UE strengthening.   -       User Key  (r) = Recorded By, (t) = Taken By, (c) = Cosigned By    Initials Name Provider Type    Karen Knowles Physical Therapist            OP Exercises     Row Name 06/16/20 0800             Subjective Comments    Subjective Comments  Patient reports compliance with updated HEP. He has some difficulty performing sidelying ER. He is still being very cautious with right shoulder. He has had no significant incidences of sharp pain in last couple of days.   -SW         Subjective Pain    Able to rate subjective pain?  yes  -SW      Pre-Treatment Pain Level  0  -SW         Exercise 1    Exercise Name 1  see manual  -SW         Exercise 2    Exercise Name 2  supine flexion 4# tube  -SW      Reps 2  10x5\"  -SW         Exercise 3    Exercise Name 3  supine punch 4# tube  -SW      Reps 3  20  -SW         Exercise 4    Exercise Name 4  AA ER sidelying   -SW      Reps 4  20x5\"  -SW         Exercise 5    Exercise Name 5  No $   -SW      Reps 5  20  -SW         Exercise 6    Exercise Name 6  horizontal abduction red tb  -SW      Reps 6  20  -SW         Exercise 7    Exercise Name 7  " "Flexion/scaption/abduction 1#  -SW      Reps 7  10 ea side with isometric hold on opposite side  -SW         Exercise 8    Exercise Name 8  D1 and D2 flexion and extension 1#  -SW      Reps 8  20 ea  -SW         Exercise 9    Exercise Name 9  IR red/ER yellow tb  -SW      Reps 9  20  -SW         Exercise 10    Exercise Name 10  body blade elbow by side  -SW      Reps 10  5x10\"  -SW        User Key  (r) = Recorded By, (t) = Taken By, (c) = Cosigned By    Initials Name Provider Type    Karen Knowles Physical Therapist                      Manual Rx (last 36 hours)      Manual Treatments     Row Name 06/16/20 0700             Manual Rx 1    Manual Rx 1 Location  R shoulder  -SW      Manual Rx 1 Type  PROM  -SW      Manual Rx 1 Duration  10'  -SW        User Key  (r) = Recorded By, (t) = Taken By, (c) = Cosigned By    Initials Name Provider Type    Karen Knowles Physical Therapist          PT OP Goals     Row Name 06/16/20 0800          PT Short Term Goals    STG Date to Achieve  06/22/20  -     STG 1  Passive flexion to be >/= 120 deg.  -     STG 1 Progress  Met  -     STG 2  Passive ER with arm at side to be 20 deg.  -     STG 2 Progress  Met  [facility]     STG 3  Increase R cervical lateral flexion by >/= 6 degrees  -     STG 3 Progress  Met goal deleted  -     STG 4  R shoulder extension to be >/= 45 deg.  -     STG 4 Progress  Met  -        Long Term Goals    LTG Date to Achieve  07/06/20  -     LTG 1  Independent with HEP.  -     LTG 1 Progress  Met  -     LTG 2  QuickDASH score to be </= 35.  -     LTG 2 Progress  Met  -     LTG 3  R shoulder AROM WFLs all planes.  -     LTG 3 Progress  Progressing;Partially Met  -     LTG 4  Report ability to use R UE for ADLs and light IADLs.  -     LTG 4 Progress  Progressing  -     LTG 5  Note a >/= 50% improvement in instances of R UE radiculopathy  -     LTG 5 Progress  Partially Met  -     LTG 6  Patient will be able to eat with right " UE without difficulty.   -     LTG 6 Progress  Ongoing  -        Time Calculation    PT Goal Re-Cert Due Date  06/29/20  -       User Key  (r) = Recorded By, (t) = Taken By, (c) = Cosigned By    Initials Name Provider Type    Karen Knowles Physical Therapist                         Time Calculation:   Start Time: 0800  Stop Time: 0845  Time Calculation (min): 45 min  Therapy Charges for Today     Code Description Service Date Service Provider Modifiers Qty    31314011660  PT MANUAL THERAPY EA 15 MIN 6/16/2020 Karen Garcia GP 1    [id number] HC PT THER PROC EA 15 MIN 6/16/2020 Karen Garcia 2                    Karen Garcia  6/16/2020

## 2024-06-14 NOTE — TELEMEDICINE
"Neurosurgery e-Consult (IPC)     Rohith Zaldivar 84 y.o. male MRN: [id number]  Unit/Bed#: [address] [place] [address] Encounter: [id number]    Contacted by provider Dr. Kailey Ahumada   Reason for consult: Patient is improving    Patient is an 84-year-old male with h/o pituitary adenoma s/p transnasal resection by Dr. Whitney in [place] > 10 years ago, HTN, bladder cancer s/p surgical resection, hyponatremia who has had forgetfulness for the past month and sustained a fall from the couch morning of 6/12. When EMT arrived, wife Vida witnessed left-sided \"body twitching\" with staring, concerning for seizure.     I had an extensive discussion with entire family (wife, daughter, son-in-law) on the phone while they were all in [place] ED on 6/12.    Available past medical history, social history, surgical history, medication list, drug allergies and review of systems were reviewed.    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    Not taking any AC/AP in an outpatient setting.     Na on 6/12 was 132.     Assessment and Recommendations    On 6/12, I had an extensive discussion over the phone with entire family (wife, daughter, son-in-law) in [place] ED.     I explained the CTH findings, natural progression of large acute on chronic subdural hemorrhage causing mass effect and shift, and potential therapeutic options including invasive brain surgery (craniotomy) for evacuation of SDH.      I extensively explained the goal of surgery (mainly life saving), expected postoperative recovery, potential risks and complications including but not limited to hemorrhage, stroke, seizure, new neurologic deficits, CSF leak, infection, redo surgery, " "respiratory issues, multiorgan failure especially given advanced age.    After this extensive discussion, family requested comfort care measures only.  Due to this decision the trauma attending, Dr. Lowe, and I recommended patient remain at [facility] for these comfort care measures.    I was informed that the patient's neurologic exam has significantly improved, and he is now \"talking and walking\".    Since he has history of seizure-like activity, and he is now returned to neurologic baseline, I strongly recommend continuing Keppra and following up with neurology outpatient.    Recent CTH findings are still significant, and he is still at risk for experiencing additional acute neurologic decline and/or further seizure activity.    If he remains stable enough for discharge, he can follow-up with neurosurgery outpatient in 2 weeks with repeat CTH.    Continue to hold off any AC/AP.    All questions and concerns were addressed. Provider is in agreement with the course of action.     31+ minutes were spent in clinical decision making and direct communication, >50% of the total time devoted to medical consultative verbal/EMR discussion between providers.      Tawanda Osman M.D.  Neurosurgeon On Call          "

## 2024-06-14 NOTE — PROGRESS NOTES
[facility]  Progress Note  Name: Rohith Zaldivar I  MRN: [id number]  Unit/Bed#: [address] [place] [address] I Date of Admission: 6/12/2024   Date of Service: 6/14/2024 I Hospital Day: 2    Assessment & Plan   * Subdural hematoma (HCC)  Assessment & Plan  84-year-old male with h/o pituitary adenoma s/p transnasal resection by Dr. Whitney in [place] > 10 years ago, HTN, bladder cancer s/p surgical resection, hyponatremia who has had forgetfulness for the past month and sustained a fall from the couch this morning     CT Head: Right greater than left acute mixed density bilateral cerebral convexity subdural hematomas with 0.8 cm leftward midline shift.     Neurosurgery was consulted and conversations were held with family regarding prognosis and treatment options  Ultimately patient and family decided to proceed with conservative management and being admitted under comfort measures with hospice consult via case management    6/14/24 Patient doing remarkably well and comfort measures have been rescinded    Updated neurology and neurosurgery regarding patient's status via Tiger text  Continue Keppra 500 mg twice daily for seizure prophylaxis  Routine EEG ordered    Will repeat CT head to assess for changes in hematoma size, this will most likely not affect plan for d/c to rehab facility with outpatient neurology and neurosurgery follow up    Avoid blood thinners    Goals of care, counseling/discussion  Assessment & Plan  Patient doing remarkably well and comfort measures have been rescinded  CODE STATUS changed to the level 3 DNR/DNI  Will resume home medications and ongoing medical care.      Seizure (HCC)  Assessment & Plan  Started on Keppra 500 mg twice daily for seizure prophylaxis    F/U EEG results    Brain compression (HCC)  Assessment & Plan  Compression of brain due to SDH a/e/b abnormal CT brain showing midline shift treated with neurosurgery consult, comfort care.   Continue with Keppra 500 mg twice daily  for seizures prophylaxis    Anxiety  Assessment & Plan  Continue lexapro and ativan               VTE Pharmacologic Prophylaxis: VTE Score: 3 Moderate Risk (Score 3-4) - Pharmacological DVT Prophylaxis Contraindicated. Sequential Compression Devices Ordered.    Mobility:   Basic Mobility Inpatient Raw Score: 12  JH-HLM Goal: 4: Move to chair/commode  JH-HLM Achieved: 6: Walk 10 steps or more (with help of two assistance)  JH-HLM Goal achieved. Continue to encourage appropriate mobility.    Patient Centered Rounds: I performed bedside rounds with nursing staff today.   Discussions with Specialists or Other Care Team Provider: neuro surgery and neurology    Education and Discussions with Family / Patient: Updated  (wife and daughter) at bedside.    Total Time Spent on Date of Encounter in care of patient: 35 mins. This time was spent on one or more of the following: performing physical exam; counseling and coordination of care; obtaining or reviewing history; documenting in the medical record; reviewing/ordering tests, medications or procedures; communicating with other healthcare professionals and discussing with patient's family/caregivers.    Current Length of Stay: 2 day(s)  Current Patient Status: Inpatient   Certification Statement: The patient will continue to require additional inpatient hospital stay due to pending safe dispo  Discharge Plan: Anticipate discharge in 24-48 hrs to rehab facility    Code Status: Level 3 - DNAR and DNI    Subjective:   Patient currently has no acute complaints states that he is feeling well    Objective:     Vitals:   Temp (24hrs), Av °F (36.7 °C), Min:97.9 °F (36.6 °C), Max:98 °F (36.7 °C)    Temp:  [97.9 °F (36.6 °C)-98 °F (36.7 °C)] 97.9 °F (36.6 °C)  HR:  [65-76] 76  Resp:  [18] 18  BP: (139-169)/(71-79) 169/79  SpO2:  [95 %-96 %] 95 %  Body mass index is 26.83 kg/m².     Input and Output Summary (last 24 hours):     Intake/Output Summary (Last 24 hours) at  6/14/2024 1731  Last data filed at 6/14/2024 0544  Gross per 24 hour   Intake --   Output 575 ml   Net -575 ml       Physical Exam:   Physical Exam  Vitals and nursing note reviewed.   Constitutional:       General: He is not in acute distress.     Appearance: He is well-developed.   HENT:      Head: Normocephalic and atraumatic.   Eyes:      Conjunctiva/sclera: Conjunctivae normal.   Cardiovascular:      Rate and Rhythm: Normal rate and regular rhythm.      Heart sounds: No murmur heard.  Pulmonary:      Effort: Pulmonary effort is normal. No respiratory distress.      Breath sounds: Normal breath sounds.   Abdominal:      Palpations: Abdomen is soft.      Tenderness: There is no abdominal tenderness.   Musculoskeletal:         General: No swelling.      Cervical back: Neck supple.   Skin:     General: Skin is warm and dry.   Neurological:      Mental Status: He is alert. Mental status is at baseline. He is disoriented.   Psychiatric:         Mood and Affect: Mood normal.          Additional Data:     Labs:  Results from last 7 days   Lab Units 06/12/24  1102   WBC Thousand/uL 6.96   HEMOGLOBIN g/dL 12.2   HEMATOCRIT % 37.1   PLATELETS Thousands/uL 162   SEGS PCT % 88*   LYMPHO PCT % 6*   MONO PCT % 6   EOS PCT % 0     Results from last 7 days   Lab Units 06/12/24  1102   SODIUM mmol/L 132*   POTASSIUM mmol/L 4.4   CHLORIDE mmol/L 107   CO2 mmol/L 28   BUN mg/dL 32*   CREATININE mg/dL 1.40*   ANION GAP mmol/L -3*   CALCIUM mg/dL 9.4   ALBUMIN g/dL 4.2   TOTAL BILIRUBIN mg/dL 0.67   ALK PHOS U/L 78   ALT U/L 21   AST U/L 32   GLUCOSE RANDOM mg/dL 108     Results from last 7 days   Lab Units 06/12/24  1234   INR  1.10     Results from last 7 days   Lab Units 06/12/24  1044   POC GLUCOSE mg/dl 112               Lines/Drains:  Invasive Devices       Peripheral Intravenous Line  Duration             Peripheral IV 06/12/24 Right Antecubital 2 days    Peripheral IV 06/12/24 Right;Ventral (anterior) Hand 2 days               Drain  Duration             External Urinary Catheter Large <1 day                          Imaging: Reviewed radiology reports from this admission including: CT head    Recent Cultures (last 7 days):         Last 24 Hours Medication List:   Current Facility-Administered Medications   Medication Dose Route Frequency Provider Last Rate    acetaminophen  650 mg Oral Q4H PRN Kailey Ahumada,       aluminum-magnesium hydroxide-simethicone  30 mL Oral Q6H PRN Kailey Ahumaad, DO      escitalopram  20 mg Oral Daily Kailey Ahumada, DO      levETIRAcetam  500 mg Oral Q12H [facility] Kailey Ahumada DO      LORazepam  1 mg Intravenous Q4H PRN Kailey Ahumada, DO      melatonin  3 mg Oral HS Hattie Pacheco PA-C      morphine injection  2 mg Intravenous Q1H PRN Kailey Ahumada,       ondansetron  4 mg Intravenous Q6H PRN Kailey Ahumada, DO      QUEtiapine  25 mg Oral HS Kailey Ahumada DO          Today, Patient Was Seen By: Kailey Ahumada DO    **Please Note: This note may have been constructed using a voice recognition system.**

## 2024-06-14 NOTE — TELEPHONE ENCOUNTER
He has a brain bleed on both sides on both sides of the brain and an intercranial hematoma. They were originally given a choice for brain surgery but given his age and what they saw could make his mental state everything worse- they did not seem to want to do that.     The family has decided on comfort care for him. They can provide that care in the hospital and now they are considering sending him home because he is not progressing quick enough to keep him on comfort care in  the hospital.     They want your opinion on what they should do. They need guidance on what next steps that can do.    I personally mentioned [facility] hospice house they have in [place] but I told them I would send this to you and they can get your opinion on what they should do as they said they are not getting much help.    Lesley Serrano  CMA

## 2024-06-14 NOTE — CASE MANAGEMENT
Case Management Discharge Planning Note    Patient name Rohith Zaldivar  Location [address] [place] [address] [place] [address]-* MRN [id number]  : 1940 Date 2024       Current Admission Date: 2024  Current Admission Diagnosis:Subdural hematoma (HCC)   Patient Active Problem List    Diagnosis Date Noted Date Diagnosed    Brain compression (HCC) 2024     Subdural hematoma (HCC) 2024     Anxiety 2023     Mild aortic stenosis 2023     Osteopenia 2022     Mild cognitive disorder 10/09/2019     Kidney disease, chronic, stage III (GFR 30-59 ml/min) (HCC) 2017     Mixed hyperlipidemia 2017     Bladder cancer (HCC) 2015     Thrombocytopenia (HCC) 2014     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       LOS (days): 2  Geometric Mean LOS (GMLOS) (days): 4.7  Days to GMLOS:2.6     OBJECTIVE:  Risk of Unplanned Readmission Score: 12.55         Current admission status: Inpatient   [facility]   [id number] [address] [address]  Phone: [phone] Fax: [phone]    Primary Care Provider: Frank Lombardi, DO    Primary Insurance: [employer] REP  Secondary Insurance:     DISCHARGE DETAILS:    Discharge planning discussed with:: patient's spouse and children  Freedom of Choice: Yes  Comments - Freedom of Choice: Choice is for blanket STR referrals.  CM contacted family/caregiver?: Yes  Were Treatment Team discharge recommendations reviewed with patient/caregiver?: Yes  Did patient/caregiver verbalize understanding of patient care needs?: N/A- going to facility  Were patient/caregiver advised of the risks associated with not following Treatment Team discharge recommendations?: Yes         Requested Home Health Care         Is the patient  interested in HHC at discharge?: No    DME Referral Provided  Referral made for DME?: No    Other Referral/Resources/Interventions Provided:  Interventions: Short Term Rehab  Referral Comments: [place] [facility] referrals opened in [place].    Would you like to participate in our [facility] service program?  : No - Declined    Treatment Team Recommendation: Short Term Rehab  Discharge Destination Plan:: Short Term Rehab  Transport at Discharge : BLS Ambulance     Additional Comments: . Per rounding with provider, code status changed and family now requesting to place patient in STR.    CM s/w patient and family bedside regarding DCP. Spouse requesting to place patient is local STR. CM reviewed there are 3 STRs in [place], and facilities in [place]. Spouse requesting placement at [facility] or [facility], however open to additional referrals if neither facility is able to accept.     Referrals pending. Patient pending PT/OT evals and will require choice and insurance auth.

## 2024-06-14 NOTE — ASSESSMENT & PLAN NOTE
84-year-old male with h/o pituitary adenoma s/p transnasal resection by Dr. Whitney in [place] > 10 years ago, HTN, bladder cancer s/p surgical resection, hyponatremia who has had forgetfulness for the past month and sustained a fall from the couch this morning     CT Head: Right greater than left acute mixed density bilateral cerebral convexity subdural hematomas with 0.8 cm leftward midline shift.     Neurosurgery was consulted and conversations were held with family regarding prognosis and treatment options  Ultimately patient and family decided to proceed with conservative management and being admitted under comfort measures with hospice consult via case management    6/14/24 Patient doing remarkably well and comfort measures have been rescinded    Updated neurology and neurosurgery regarding patient's status via Tiger text  Continue Keppra 500 mg twice daily for seizure prophylaxis  F/u  EEG results    Repeat CT head 6/14/24:   2.7 cm (previously 2.6 cm) thick mixed-density predominantly isodense subdural hematoma along right cerebral convexity with compressive mass effect on adjacent right cerebral hemisphere, partial effacement of right lateral ventricle, 1.0 cm leftward   midline shift (previously 0.8 cm), and suspected right uncal herniation. Recommend reassessment by neurosurgery.     1.2 cm thick mixed-density subdural hematoma along left cerebral convexity with mild adjacent mass effect on left parietal lobe, unchanged.    Family wishes no invasive treatments including surgery  Patient noted to have loss of function of right side  Comfort measures only

## 2024-06-14 NOTE — PLAN OF CARE
Problem: Potential for Falls  Goal: Patient will remain free of falls  Description: INTERVENTIONS:  - Educate patient/family on patient safety including physical limitations  - Instruct patient to call for assistance with activity   - Consult OT/PT to assist with strengthening/mobility   - Keep Call bell within reach  - Keep bed low and locked with side rails adjusted as appropriate  - Keep care items and personal belongings within reach  - Initiate and maintain comfort rounds  - Make Fall Risk Sign visible to staff  - Offer Toileting every 2 Hours, in advance of need  - Initiate/Maintain bed alarm  - Obtain necessary fall risk management equipment: yellow socks  - Apply yellow socks and bracelet for high fall risk patients  - Consider moving patient to room near nurses station  Outcome: Progressing     Problem: PAIN - ADULT  Goal: Verbalizes/displays adequate comfort level or baseline comfort level  Description: Interventions:  - Encourage patient to monitor pain and request assistance  - Assess pain using appropriate pain scale  - Administer analgesics based on type and severity of pain and evaluate response  - Implement non-pharmacological measures as appropriate and evaluate response  - Consider cultural and social influences on pain and pain management  - Notify physician/advanced practitioner if interventions unsuccessful or patient reports new pain  Outcome: Progressing     Problem: INFECTION - ADULT  Goal: Absence or prevention of progression during hospitalization  Description: INTERVENTIONS:  - Assess and monitor for signs and symptoms of infection  - Monitor lab/diagnostic results  - Monitor all insertion sites, i.e. indwelling lines, tubes, and drains  - Monitor endotracheal if appropriate and nasal secretions for changes in amount and color  - Williams appropriate cooling/warming therapies per order  - Administer medications as ordered  - Instruct and encourage patient and family to use good hand  hygiene technique  - Identify and instruct in appropriate isolation precautions for identified infection/condition  Outcome: Progressing  Goal: Absence of fever/infection during neutropenic period  Description: INTERVENTIONS:  - Monitor WBC    Outcome: Progressing     Problem: SAFETY ADULT  Goal: Patient will remain free of falls  Description: INTERVENTIONS:  - Educate patient/family on patient safety including physical limitations  - Instruct patient to call for assistance with activity   - Consult OT/PT to assist with strengthening/mobility   - Keep Call bell within reach  - Keep bed low and locked with side rails adjusted as appropriate  - Keep care items and personal belongings within reach  - Initiate and maintain comfort rounds  - Make Fall Risk Sign visible to staff  - Offer Toileting every 2 Hours, in advance of need  - Initiate/Maintain bed alarm  - Obtain necessary fall risk management equipment: yellow socks  - Apply yellow socks and bracelet for high fall risk patients  - Consider moving patient to room near nurses station  Outcome: Progressing  Goal: Maintain or return to baseline ADL function  Description: INTERVENTIONS:  -  Assess patient's ability to carry out ADLs; assess patient's baseline for ADL function and identify physical deficits which impact ability to perform ADLs (bathing, care of mouth/teeth, toileting, grooming, dressing, etc.)  - Assess/evaluate cause of self-care deficits   - Assess range of motion  - Assess patient's mobility; develop plan if impaired  - Assess patient's need for assistive devices and provide as appropriate  - Encourage maximum independence but intervene and supervise when necessary  - Involve family in performance of ADLs  - Assess for home care needs following discharge   - Consider OT consult to assist with ADL evaluation and planning for discharge  - Provide patient education as appropriate  Outcome: Progressing  Goal: Maintains/Returns to pre admission  functional level  Description: INTERVENTIONS:  - Perform AM-PAC 6 Click Basic Mobility/ Daily Activity assessment daily.  - Set and communicate daily mobility goal to care team and patient/family/caregiver.   - Collaborate with rehabilitation services on mobility goals if consulted  - Perform Range of Motion 3 times a day.  - Reposition patient every 2 hours.  - Dangle patient 3 times a day  - Stand patient 3 times a day  - Ambulate patient 3 times a day  - Out of bed to chair 3 times a day   - Out of bed for meals 3 times a day  - Out of bed for toileting  - Record patient progress and toleration of activity level   Outcome: Progressing     Problem: Knowledge Deficit  Goal: Patient/family/caregiver demonstrates understanding of disease process, treatment plan, medications, and discharge instructions  Description: Complete learning assessment and assess knowledge base.  Interventions:  - Provide teaching at level of understanding  - Provide teaching via preferred learning methods  Outcome: Progressing     Problem: Prexisting or High Potential for Compromised Skin Integrity  Goal: Skin integrity is maintained or improved  Description: INTERVENTIONS:  - Identify patients at risk for skin breakdown  - Assess and monitor skin integrity  - Assess and monitor nutrition and hydration status  - Monitor labs   - Assess for incontinence   - Turn and reposition patient  - Assist with mobility/ambulation  - Relieve pressure over bony prominences  - Avoid friction and shearing  - Provide appropriate hygiene as needed including keeping skin clean and dry  - Evaluate need for skin moisturizer/barrier cream  - Collaborate with interdisciplinary team   - Patient/family teaching  - Consider wound care consult   Outcome: Progressing     Problem: NEUROSENSORY - ADULT  Goal: Achieves stable or improved neurological status  Description: INTERVENTIONS  - Monitor and report changes in neurological status  - Monitor vital signs such as  temperature, blood pressure, glucose, and any other labs ordered   - Initiate measures to prevent increased intracranial pressure  - Monitor for seizure activity and implement precautions if appropriate      Outcome: Progressing  Goal: Remains free of injury related to seizures activity  Description: INTERVENTIONS  - Maintain airway, patient safety  and administer oxygen as ordered  - Monitor patient for seizure activity, document and report duration and description of seizure to physician/advanced practitioner  - If seizure occurs,  ensure patient safety during seizure  - Reorient patient post seizure  - Seizure pads on all 4 side rails  - Instruct patient/family to notify RN of any seizure activity including if an aura is experienced  - Instruct patient/family to call for assistance with activity based on nursing assessment  - Administer anti-seizure medications if ordered    Outcome: Progressing  Goal: Achieves maximal functionality and self care  Description: INTERVENTIONS  - Monitor swallowing and airway patency with patient fatigue and changes in neurological status  - Encourage and assist patient to increase activity and self care.   - Encourage visually impaired, hearing impaired and aphasic patients to use assistive/communication devices  Outcome: Progressing

## 2024-06-15 ENCOUNTER — HOME CARE VISIT (OUTPATIENT)
Dept: HOME HEALTH SERVICES | Facility: HOME HEALTHCARE | Age: 84
End: 2024-06-15

## 2024-06-15 NOTE — QUICK NOTE
"CT head obtained today by day team showed progression of patient's subdural hematoma \"2.7 cm (previously 2.6 cm) thick mixed-density predominantly isodense subdural hematoma along right cerebral convexity with compressive mass effect on adjacent right cerebral hemisphere, partial effacement of right lateral ventricle, 1.0 cm leftward midline shift (previously 0.8 cm), and suspected right uncal herniation. Recommend reassessment by neurosurgery. 1.2 cm thick mixed-density subdural hematoma along left cerebral convexity with mild adjacent mass effect on left parietal lobe, unchanged.Similar mild ventriculomegaly.\"    -long discussion with patient's daughter, wife, and son-in-law at bedside, they confirmed that they do not want any surgical intervention.   -given worsening subdural hematoma on imaging, declining cognitive function, patient should be re-evaluated for IP hospice house     "

## 2024-06-15 NOTE — PROGRESS NOTES
Critical access hospital  Progress Note  Name: Rohith Zaldivar I  MRN: [id number]  Unit/Bed#: [address] [place] [address] I Date of Admission: 6/12/2024   Date of Service: 6/15/2024 I Hospital Day: 3    Assessment & Plan   * Subdural hematoma (HCC)  Assessment & Plan  84-year-old male with h/o pituitary adenoma s/p transnasal resection by Dr. Whitney in [place] > 10 years ago, HTN, bladder cancer s/p surgical resection, hyponatremia who has had forgetfulness for the past month and sustained a fall from the couch this morning     CT Head: Right greater than left acute mixed density bilateral cerebral convexity subdural hematomas with 0.8 cm leftward midline shift.     Neurosurgery was consulted and conversations were held with family regarding prognosis and treatment options  Ultimately patient and family decided to proceed with conservative management and being admitted under comfort measures with hospice consult via case management    6/14/24 Patient doing remarkably well and comfort measures have been rescinded    Updated neurology and neurosurgery regarding patient's status via [facility] text  Continue Keppra 500 mg twice daily for seizure prophylaxis  F/u  EEG results    Repeat CT head 6/14/24:   2.7 cm (previously 2.6 cm) thick mixed-density predominantly isodense subdural hematoma along right cerebral convexity with compressive mass effect on adjacent right cerebral hemisphere, partial effacement of right lateral ventricle, 1.0 cm leftward   midline shift (previously 0.8 cm), and suspected right uncal herniation. Recommend reassessment by neurosurgery.     1.2 cm thick mixed-density subdural hematoma along left cerebral convexity with mild adjacent mass effect on left parietal lobe, unchanged.    Family wishes no invasive treatments including surgery  Patient appears to be decompensating today  Will have hospice re-evaluate patient    Goals of care, counseling/discussion  Assessment & Plan  Patient doing remarkably well and  comfort measures have been rescinded  CODE STATUS changed to the level 3 DNR/DNI    Patient deteriorated this morning  Will have hospice re-evaluate patient      Seizure (HCC)  Assessment & Plan  Started on Keppra 500 mg twice daily for seizure prophylaxis    F/U EEG results    Brain compression (HCC)  Assessment & Plan  Compression of brain due to SDH a/e/b abnormal CT brain showing midline shift treated with neurosurgery consult, comfort care.   Continue with Keppra 500 mg twice daily for seizures prophylaxis    Anxiety  Assessment & Plan  Continue lexapro and ativan               VTE Pharmacologic Prophylaxis: VTE Score: 3 Moderate Risk (Score 3-4) - Pharmacological DVT Prophylaxis Contraindicated. Sequential Compression Devices Ordered.    Mobility:   Basic Mobility Inpatient Raw Score: 12  -[facility] Goal: 4: Move to chair/commode  -HLM Achieved: 6: Walk 10 steps or more (with help of two assistance)  -[facility] Goal achieved. Continue to encourage appropriate mobility.    Patient Centered Rounds: I performed bedside rounds with nursing staff today.   Discussions with Specialists or Other Care Team Provider: case management    Education and Discussions with Family / Patient: Updated  (wife and daughter) at bedside.    Total Time Spent on Date of Encounter in care of patient: 35 mins. This time was spent on one or more of the following: performing physical exam; counseling and coordination of care; obtaining or reviewing history; documenting in the medical record; reviewing/ordering tests, medications or procedures; communicating with other healthcare professionals and discussing with patient's family/caregivers.    Current Length of Stay: 3 day(s)  Current Patient Status: Inpatient   Certification Statement: The patient will continue to require additional inpatient hospital stay due to pending safe dispo  Discharge Plan: Anticipate discharge in 24-48 hrs to hospice    Code Status: Level 3 - DNAR and  DNI    Subjective:   Patient is more obtunded today    Objective:     Vitals:   Temp (24hrs), Av.5 °F (36.9 °C), Min:97.4 °F (36.3 °C), Max:100 °F (37.8 °C)    Temp:  [97.4 °F (36.3 °C)-100 °F (37.8 °C)] 97.8 °F (36.6 °C)  HR:  [65-75] 65  Resp:  [16-18] 16  BP: (126-150)/(74-86) 147/81  SpO2:  [92 %-96 %] 96 %  Body mass index is 26.83 kg/m².     Input and Output Summary (last 24 hours):   No intake or output data in the 24 hours ending 06/15/24 1600      Physical Exam:   Physical Exam  Vitals and nursing note reviewed.   Constitutional:       General: He is not in acute distress.     Appearance: He is well-developed.   HENT:      Head: Normocephalic and atraumatic.   Eyes:      Conjunctiva/sclera: Conjunctivae normal.   Cardiovascular:      Rate and Rhythm: Normal rate and regular rhythm.      Heart sounds: No murmur heard.  Pulmonary:      Effort: Pulmonary effort is normal. No respiratory distress.      Breath sounds: Normal breath sounds.   Abdominal:      Palpations: Abdomen is soft.      Tenderness: There is no abdominal tenderness.   Musculoskeletal:         General: No swelling.      Cervical back: Neck supple.   Skin:     General: Skin is warm and dry.   Neurological:      Mental Status: He is alert. Mental status is at baseline. He is disoriented.   Psychiatric:         Mood and Affect: Mood normal.          Additional Data:     Labs:  Results from last 7 days   Lab Units 24  1102   WBC Thousand/uL 6.96   HEMOGLOBIN g/dL 12.2   HEMATOCRIT % 37.1   PLATELETS Thousands/uL 162   SEGS PCT % 88*   LYMPHO PCT % 6*   MONO PCT % 6   EOS PCT % 0     Results from last 7 days   Lab Units 24  1102   SODIUM mmol/L 132*   POTASSIUM mmol/L 4.4   CHLORIDE mmol/L 107   CO2 mmol/L 28   BUN mg/dL 32*   CREATININE mg/dL 1.40*   ANION GAP mmol/L -3*   CALCIUM mg/dL 9.4   ALBUMIN g/dL 4.2   TOTAL BILIRUBIN mg/dL 0.67   ALK PHOS U/L 78   ALT U/L 21   AST U/L 32   GLUCOSE RANDOM mg/dL 108     Results from last 7  days   Lab Units 06/12/24  1234   INR  1.10     Results from last 7 days   Lab Units 06/12/24  1044   POC GLUCOSE mg/dl 112               Lines/Drains:  Invasive Devices       Peripheral Intravenous Line  Duration             Peripheral IV 06/12/24 Right Antecubital 3 days    Peripheral IV 06/12/24 Right;Ventral (anterior) Hand 3 days              Drain  Duration             External Urinary Catheter Large 1 day                          Imaging: Reviewed radiology reports from this admission including: CT head    Recent Cultures (last 7 days):         Last 24 Hours Medication List:   Current Facility-Administered Medications   Medication Dose Route Frequency Provider Last Rate    acetaminophen  650 mg Oral Q4H PRN Kailey Ahumada, DO      aluminum-magnesium hydroxide-simethicone  30 mL Oral Q6H PRN Kailey Ahumada, DO      escitalopram  20 mg Oral Daily Kailey Ahumada, DO      levETIRAcetam  500 mg Oral Q12H DANIELLE Kailey Ahumdaa, DO      LORazepam  1 mg Intravenous Q4H PRN Monai Brandyn, DO      melatonin  3 mg Oral HS Hattie Pacheco PA-C      morphine injection  2 mg Intravenous Q1H PRN Kailey Ahumada, DO      ondansetron  4 mg Intravenous Q6H PRN Kailey Ahumada, DO      QUEtiapine  25 mg Oral HS Kailey Ahumada DO          Today, Patient Was Seen By: Kailey Ahumada DO    **Please Note: This note may have been constructed using a voice recognition system.**

## 2024-06-15 NOTE — PLAN OF CARE
Problem: Potential for Falls  Goal: Patient will remain free of falls  Description: INTERVENTIONS:  - Educate patient/family on patient safety including physical limitations  - Instruct patient to call for assistance with activity   - Consult OT/PT to assist with strengthening/mobility   - Keep Call bell within reach  - Keep bed low and locked with side rails adjusted as appropriate  - Keep care items and personal belongings within reach  - Initiate and maintain comfort rounds  - Make Fall Risk Sign visible to staff  - Offer Toileting every 2 Hours, in advance of need  - Initiate/Maintain bed alarm  - Obtain necessary fall risk management equipment: yellow socks  - Apply yellow socks and bracelet for high fall risk patients  - Consider moving patient to room near nurses station  Outcome: Progressing     Problem: PAIN - ADULT  Goal: Verbalizes/displays adequate comfort level or baseline comfort level  Description: Interventions:  - Encourage patient to monitor pain and request assistance  - Assess pain using appropriate pain scale  - Administer analgesics based on type and severity of pain and evaluate response  - Implement non-pharmacological measures as appropriate and evaluate response  - Consider cultural and social influences on pain and pain management  - Notify physician/advanced practitioner if interventions unsuccessful or patient reports new pain  Outcome: Progressing     Problem: INFECTION - ADULT  Goal: Absence or prevention of progression during hospitalization  Description: INTERVENTIONS:  - Assess and monitor for signs and symptoms of infection  - Monitor lab/diagnostic results  - Monitor all insertion sites, i.e. indwelling lines, tubes, and drains  - Monitor endotracheal if appropriate and nasal secretions for changes in amount and color  - [place] appropriate cooling/warming therapies per order  - Administer medications as ordered  - Instruct and encourage patient and family to use good hand  hygiene technique  - Identify and instruct in appropriate isolation precautions for identified infection/condition  Outcome: Progressing  Goal: Absence of fever/infection during neutropenic period  Description: INTERVENTIONS:  - Monitor WBC    Outcome: Progressing     Problem: SAFETY ADULT  Goal: Patient will remain free of falls  Description: INTERVENTIONS:  - Educate patient/family on patient safety including physical limitations  - Instruct patient to call for assistance with activity   - Consult OT/PT to assist with strengthening/mobility   - Keep Call bell within reach  - Keep bed low and locked with side rails adjusted as appropriate  - Keep care items and personal belongings within reach  - Initiate and maintain comfort rounds  - Make Fall Risk Sign visible to staff  - Offer Toileting every 2 Hours, in advance of need  - Initiate/Maintain bed alarm  - Obtain necessary fall risk management equipment: yellow socks  - Apply yellow socks and bracelet for high fall risk patients  - Consider moving patient to room near nurses station  Outcome: Progressing  Goal: Maintain or return to baseline ADL function  Description: INTERVENTIONS:  -  Assess patient's ability to carry out ADLs; assess patient's baseline for ADL function and identify physical deficits which impact ability to perform ADLs (bathing, care of mouth/teeth, toileting, grooming, dressing, etc.)  - Assess/evaluate cause of self-care deficits   - Assess range of motion  - Assess patient's mobility; develop plan if impaired  - Assess patient's need for assistive devices and provide as appropriate  - Encourage maximum independence but intervene and supervise when necessary  - Involve family in performance of ADLs  - Assess for home care needs following discharge   - Consider OT consult to assist with ADL evaluation and planning for discharge  - Provide patient education as appropriate  Outcome: Progressing  Goal: Maintains/Returns to pre admission  functional level  Description: INTERVENTIONS:  - Perform AM-PAC 6 Click Basic Mobility/ Daily Activity assessment daily.  - Set and communicate daily mobility goal to care team and patient/family/caregiver.   - Collaborate with rehabilitation services on mobility goals if consulted  - Perform Range of Motion 3 times a day.  - Reposition patient every 2 hours.  - Dangle patient 3 times a day  - Stand patient 3 times a day  - Ambulate patient 3 times a day  - Out of bed to chair 3 times a day   - Out of bed for meals 3 times a day  - Out of bed for toileting  - Record patient progress and toleration of activity level   Outcome: Progressing     Problem: DISCHARGE PLANNING  Goal: Discharge to home or other facility with appropriate resources  Description: INTERVENTIONS:  - Identify barriers to discharge w/patient and caregiver  - Arrange for needed discharge resources and transportation as appropriate  - Identify discharge learning needs (meds, wound care, etc.)  - Arrange for interpretive services to assist at discharge as needed  - Refer to Case Management Department for coordinating discharge planning if the patient needs post-hospital services based on physician/advanced practitioner order or complex needs related to functional status, cognitive ability, or social support system  Outcome: Progressing     Problem: Knowledge Deficit  Goal: Patient/family/caregiver demonstrates understanding of disease process, treatment plan, medications, and discharge instructions  Description: Complete learning assessment and assess knowledge base.  Interventions:  - Provide teaching at level of understanding  - Provide teaching via preferred learning methods  Outcome: Progressing     Problem: Prexisting or High Potential for Compromised Skin Integrity  Goal: Skin integrity is maintained or improved  Description: INTERVENTIONS:  - Identify patients at risk for skin breakdown  - Assess and monitor skin integrity  - Assess and monitor  nutrition and hydration status  - Monitor labs   - Assess for incontinence   - Turn and reposition patient  - Assist with mobility/ambulation  - Relieve pressure over bony prominences  - Avoid friction and shearing  - Provide appropriate hygiene as needed including keeping skin clean and dry  - Evaluate need for skin moisturizer/barrier cream  - Collaborate with interdisciplinary team   - Patient/family teaching  - Consider wound care consult   Outcome: Progressing     Problem: NEUROSENSORY - ADULT  Goal: Achieves stable or improved neurological status  Description: INTERVENTIONS  - Monitor and report changes in neurological status  - Monitor vital signs such as temperature, blood pressure, glucose, and any other labs ordered   - Initiate measures to prevent increased intracranial pressure  - Monitor for seizure activity and implement precautions if appropriate      Outcome: Progressing  Goal: Remains free of injury related to seizures activity  Description: INTERVENTIONS  - Maintain airway, patient safety  and administer oxygen as ordered  - Monitor patient for seizure activity, document and report duration and description of seizure to physician/advanced practitioner  - If seizure occurs,  ensure patient safety during seizure  - Reorient patient post seizure  - Seizure pads on all 4 side rails  - Instruct patient/family to notify RN of any seizure activity including if an aura is experienced  - Instruct patient/family to call for assistance with activity based on nursing assessment  - Administer anti-seizure medications if ordered    Outcome: Progressing  Goal: Achieves maximal functionality and self care  Description: INTERVENTIONS  - Monitor swallowing and airway patency with patient fatigue and changes in neurological status  - Encourage and assist patient to increase activity and self care.   - Encourage visually impaired, hearing impaired and aphasic patients to use assistive/communication devices  Outcome:  Progressing

## 2024-06-15 NOTE — HOSPICE NOTE
Hospice referral received and tpc to spouse to discuss. She does not want to move pt to hospice house in [place].  She states it is too far for her.    Liaison will continue to follow and assist as appropriate.

## 2024-06-15 NOTE — CASE MANAGEMENT
Case Management Discharge Planning Note    Patient name Rohith Zaldivar  Location [address] [address]/[address]-* MRN [id number]  : 1940 Date 6/15/2024       Current Admission Date: 2024  Current Admission Diagnosis:Subdural hematoma (HCC)   Patient Active Problem List    Diagnosis Date Noted Date Diagnosed    Seizure (HCC) 2024     Goals of care, counseling/discussion 2024     Brain compression (HCC) 2024     Subdural hematoma (HCC) 2024     Anxiety 2023     Mild aortic stenosis 2023     Osteopenia 2022     Mild cognitive disorder 10/09/2019     Kidney disease, chronic, stage III (GFR 30-59 ml/min) (HCC) 2017     Mixed hyperlipidemia 2017     Bladder cancer (HCC) 2015     Thrombocytopenia (HCC) 2014     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 DUSTIN contacted family and they are agreeable for inpatient hospice eval to be completed by [facility]. Referral sent at this time.     ADDENDUM 3:17pm: DUSTIN spoke with Jazmín of [facility] who reports she will come out to assess pt tomorrow morning. Jazmín reports they do not have hospice house beds at this time and is unsure of when they would  have an open bed. Jazmín will still come out tomorrow to eval. CM contacted  Hospice to see if they would be willing to evaluation pt for hospice house. Cm to await response from  hospice.

## 2024-06-16 NOTE — PLAN OF CARE
Problem: Potential for Falls  Goal: Patient will remain free of falls  Description: INTERVENTIONS:  - Educate patient/family on patient safety including physical limitations  - Instruct patient to call for assistance with activity   - Consult OT/PT to assist with strengthening/mobility   - Keep Call bell within reach  - Keep bed low and locked with side rails adjusted as appropriate  - Keep care items and personal belongings within reach  - Initiate and maintain comfort rounds  - Make Fall Risk Sign visible to staff  - Offer Toileting every 2 Hours, in advance of need  - Initiate/Maintain bed alarm  - Obtain necessary fall risk management equipment: yellow socks  - Apply yellow socks and bracelet for high fall risk patients  - Consider moving patient to room near nurses station  Outcome: Progressing     Problem: PAIN - ADULT  Goal: Verbalizes/displays adequate comfort level or baseline comfort level  Description: Interventions:  - Encourage patient to monitor pain and request assistance  - Assess pain using appropriate pain scale  - Administer analgesics based on type and severity of pain and evaluate response  - Implement non-pharmacological measures as appropriate and evaluate response  - Consider cultural and social influences on pain and pain management  - Notify physician/advanced practitioner if interventions unsuccessful or patient reports new pain  Outcome: Progressing     Problem: INFECTION - ADULT  Goal: Absence or prevention of progression during hospitalization  Description: INTERVENTIONS:  - Assess and monitor for signs and symptoms of infection  - Monitor lab/diagnostic results  - Monitor all insertion sites, i.e. indwelling lines, tubes, and drains  - Monitor endotracheal if appropriate and nasal secretions for changes in amount and color  - [place] appropriate cooling/warming therapies per order  - Administer medications as ordered  - Instruct and encourage patient and family to use good hand  hygiene technique  - Identify and instruct in appropriate isolation precautions for identified infection/condition  Outcome: Progressing  Goal: Absence of fever/infection during neutropenic period  Description: INTERVENTIONS:  - Monitor WBC    Outcome: Progressing     Problem: SAFETY ADULT  Goal: Patient will remain free of falls  Description: INTERVENTIONS:  - Educate patient/family on patient safety including physical limitations  - Instruct patient to call for assistance with activity   - Consult OT/PT to assist with strengthening/mobility   - Keep Call bell within reach  - Keep bed low and locked with side rails adjusted as appropriate  - Keep care items and personal belongings within reach  - Initiate and maintain comfort rounds  - Make Fall Risk Sign visible to staff  - Offer Toileting every 2 Hours, in advance of need  - Initiate/Maintain bed alarm  - Obtain necessary fall risk management equipment: yellow socks  - Apply yellow socks and bracelet for high fall risk patients  - Consider moving patient to room near nurses station  Outcome: Progressing  Goal: Maintain or return to baseline ADL function  Description: INTERVENTIONS:  -  Assess patient's ability to carry out ADLs; assess patient's baseline for ADL function and identify physical deficits which impact ability to perform ADLs (bathing, care of mouth/teeth, toileting, grooming, dressing, etc.)  - Assess/evaluate cause of self-care deficits   - Assess range of motion  - Assess patient's mobility; develop plan if impaired  - Assess patient's need for assistive devices and provide as appropriate  - Encourage maximum independence but intervene and supervise when necessary  - Involve family in performance of ADLs  - Assess for home care needs following discharge   - Consider OT consult to assist with ADL evaluation and planning for discharge  - Provide patient education as appropriate  Outcome: Progressing  Goal: Maintains/Returns to pre admission  functional level  Description: INTERVENTIONS:  - Perform AM-PAC 6 Click Basic Mobility/ Daily Activity assessment daily.  - Set and communicate daily mobility goal to care team and patient/family/caregiver.   - Collaborate with rehabilitation services on mobility goals if consulted  - Perform Range of Motion 3 times a day.  - Reposition patient every 2 hours.  - Dangle patient 3 times a day  - Stand patient 3 times a day  - Ambulate patient 3 times a day  - Out of bed to chair 3 times a day   - Out of bed for meals 3 times a day  - Out of bed for toileting  - Record patient progress and toleration of activity level   Outcome: Progressing     Problem: DISCHARGE PLANNING  Goal: Discharge to home or other facility with appropriate resources  Description: INTERVENTIONS:  - Identify barriers to discharge w/patient and caregiver  - Arrange for needed discharge resources and transportation as appropriate  - Identify discharge learning needs (meds, wound care, etc.)  - Arrange for interpretive services to assist at discharge as needed  - Refer to Case Management Department for coordinating discharge planning if the patient needs post-hospital services based on physician/advanced practitioner order or complex needs related to functional status, cognitive ability, or social support system  Outcome: Progressing     Problem: Knowledge Deficit  Goal: Patient/family/caregiver demonstrates understanding of disease process, treatment plan, medications, and discharge instructions  Description: Complete learning assessment and assess knowledge base.  Interventions:  - Provide teaching at level of understanding  - Provide teaching via preferred learning methods  Outcome: Progressing     Problem: Prexisting or High Potential for Compromised Skin Integrity  Goal: Skin integrity is maintained or improved  Description: INTERVENTIONS:  - Identify patients at risk for skin breakdown  - Assess and monitor skin integrity  - Assess and monitor  nutrition and hydration status  - Monitor labs   - Assess for incontinence   - Turn and reposition patient  - Assist with mobility/ambulation  - Relieve pressure over bony prominences  - Avoid friction and shearing  - Provide appropriate hygiene as needed including keeping skin clean and dry  - Evaluate need for skin moisturizer/barrier cream  - Collaborate with interdisciplinary team   - Patient/family teaching  - Consider wound care consult   Outcome: Progressing     Problem: NEUROSENSORY - ADULT  Goal: Achieves stable or improved neurological status  Description: INTERVENTIONS  - Monitor and report changes in neurological status  - Monitor vital signs such as temperature, blood pressure, glucose, and any other labs ordered   - Initiate measures to prevent increased intracranial pressure  - Monitor for seizure activity and implement precautions if appropriate      Outcome: Progressing  Goal: Remains free of injury related to seizures activity  Description: INTERVENTIONS  - Maintain airway, patient safety  and administer oxygen as ordered  - Monitor patient for seizure activity, document and report duration and description of seizure to physician/advanced practitioner  - If seizure occurs,  ensure patient safety during seizure  - Reorient patient post seizure  - Seizure pads on all 4 side rails  - Instruct patient/family to notify RN of any seizure activity including if an aura is experienced  - Instruct patient/family to call for assistance with activity based on nursing assessment  - Administer anti-seizure medications if ordered    Outcome: Progressing  Goal: Achieves maximal functionality and self care  Description: INTERVENTIONS  - Monitor swallowing and airway patency with patient fatigue and changes in neurological status  - Encourage and assist patient to increase activity and self care.   - Encourage visually impaired, hearing impaired and aphasic patients to use assistive/communication devices  Outcome:  Progressing

## 2024-06-16 NOTE — PROGRESS NOTES
[facility]  Progress Note  Name: Rohith Zaldivar I  MRN: [id number]  Unit/Bed#: [address] [place] [address] Date of Admission: 6/12/2024   Date of Service: 6/16/2024 I Hospital Day: 4    Assessment & Plan   * Subdural hematoma (HCC)  Assessment & Plan  84-year-old male with h/o pituitary adenoma s/p transnasal resection by Dr. Whitney in [place] > 10 years ago, HTN, bladder cancer s/p surgical resection, hyponatremia who has had forgetfulness for the past month and sustained a fall from the couch this morning     CT Head: Right greater than left acute mixed density bilateral cerebral convexity subdural hematomas with 0.8 cm leftward midline shift.     Neurosurgery was consulted and conversations were held with family regarding prognosis and treatment options  Ultimately patient and family decided to proceed with conservative management and being admitted under comfort measures with hospice consult via case management    6/14/24 Patient doing remarkably well and comfort measures have been rescinded    Updated neurology and neurosurgery regarding patient's status via Tiger text  Continue Keppra 500 mg twice daily for seizure prophylaxis  F/u  EEG results    Repeat CT head 6/14/24:   2.7 cm (previously 2.6 cm) thick mixed-density predominantly isodense subdural hematoma along right cerebral convexity with compressive mass effect on adjacent right cerebral hemisphere, partial effacement of right lateral ventricle, 1.0 cm leftward   midline shift (previously 0.8 cm), and suspected right uncal herniation. Recommend reassessment by neurosurgery.     1.2 cm thick mixed-density subdural hematoma along left cerebral convexity with mild adjacent mass effect on left parietal lobe, unchanged.    Family wishes no invasive treatments including surgery  Patient noted to have loss of function of right side  Will have hospice re-evaluate patient    Goals of care, counseling/discussion  Assessment & Plan  Patient doing remarkably  well and comfort measures have been rescinded  CODE STATUS changed to the level 3 DNR/DNI    Patient deteriorated this morning  Will have hospice re-evaluate patient  Patient unable to move right side of body today    Seizure (HCC)  Assessment & Plan  Started on Keppra 500 mg twice daily for seizure prophylaxis    EEG Interpretation: This Routine EEG recorded during wakefulness is abnormal. The tracing was limited due to excessive diffuse muscle artifact, but in visible sections of the tracing, background activities were mildly too slow suggesting mild diffuse cerebral dysfunction of non-specific etiology.     Brain compression (HCC)  Assessment & Plan  Compression of brain due to SDH a/e/b abnormal CT brain showing midline shift treated with neurosurgery consult, comfort care.   Continue with Keppra 500 mg twice daily for seizures prophylaxis    Anxiety  Assessment & Plan  Continue lexapro and ativan               VTE Pharmacologic Prophylaxis: VTE Score: 3 Moderate Risk (Score 3-4) - Pharmacological DVT Prophylaxis Contraindicated. Sequential Compression Devices Ordered.    Mobility:   Basic Mobility Inpatient Raw Score: 10  JH-HL Goal: 4: Move to chair/commode  JH-HLM Achieved: 2: Bed activities/Dependent transfer  JH-HLM Goal achieved. Continue to encourage appropriate mobility.    Patient Centered Rounds: I performed bedside rounds with nursing staff today.   Discussions with Specialists or Other Care Team Provider: case management    Education and Discussions with Family / Patient: Updated  (wife and daughter) at bedside.    Total Time Spent on Date of Encounter in care of patient: 35 mins. This time was spent on one or more of the following: performing physical exam; counseling and coordination of care; obtaining or reviewing history; documenting in the medical record; reviewing/ordering tests, medications or procedures; communicating with other healthcare professionals and discussing with  patient's family/caregivers.    Current Length of Stay: 4 day(s)  Current Patient Status: Inpatient   Certification Statement: The patient will continue to require additional inpatient hospital stay due to pending safe dispo  Discharge Plan: Anticipate discharge in 24-48 hrs to hospice    Code Status: Level 3 - DNAR and DNI    Subjective:   Patient is more obtunded today    Objective:     Vitals:   Temp (24hrs), Av.7 °F (37.6 °C), Min:97.9 °F (36.6 °C), Max:100.7 °F (38.2 °C)    Temp:  [97.9 °F (36.6 °C)-100.7 °F (38.2 °C)] 97.9 °F (36.6 °C)  HR:  [63-78] 63  Resp:  [18-19] 19  BP: (125-133)/(66-76) 125/66  SpO2:  [91 %-96 %] 96 %  Body mass index is 26.83 kg/m².     Input and Output Summary (last 24 hours):     Intake/Output Summary (Last 24 hours) at 2024 1201  Last data filed at 6/15/2024 1801  Gross per 24 hour   Intake --   Output 1650 ml   Net -1650 ml         Physical Exam:   Physical Exam  Vitals and nursing note reviewed.   Constitutional:       General: He is not in acute distress.     Appearance: He is well-developed.   HENT:      Head: Normocephalic and atraumatic.   Eyes:      Conjunctiva/sclera: Conjunctivae normal.   Cardiovascular:      Rate and Rhythm: Normal rate and regular rhythm.      Heart sounds: No murmur heard.  Pulmonary:      Effort: Pulmonary effort is normal. No respiratory distress.      Breath sounds: Normal breath sounds.   Abdominal:      Palpations: Abdomen is soft.      Tenderness: There is no abdominal tenderness.   Musculoskeletal:         General: No swelling.      Cervical back: Neck supple.   Skin:     General: Skin is warm and dry.   Neurological:      Mental Status: He is alert. Mental status is at baseline. He is disoriented.      Comments: Right sided weakness, dysarthria   Psychiatric:         Mood and Affect: Mood normal.          Additional Data:     Labs:  Results from last 7 days   Lab Units 24  1102   WBC Thousand/uL 6.96   HEMOGLOBIN g/dL 12.2    HEMATOCRIT % 37.1   PLATELETS Thousands/uL 162   SEGS PCT % 88*   LYMPHO PCT % 6*   MONO PCT % 6   EOS PCT % 0     Results from last 7 days   Lab Units 06/12/24  1102   SODIUM mmol/L 132*   POTASSIUM mmol/L 4.4   CHLORIDE mmol/L 107   CO2 mmol/L 28   BUN mg/dL 32*   CREATININE mg/dL 1.40*   ANION GAP mmol/L -3*   CALCIUM mg/dL 9.4   ALBUMIN g/dL 4.2   TOTAL BILIRUBIN mg/dL 0.67   ALK PHOS U/L 78   ALT U/L 21   AST U/L 32   GLUCOSE RANDOM mg/dL 108     Results from last 7 days   Lab Units 06/12/24  1234   INR  1.10     Results from last 7 days   Lab Units 06/12/24  1044   POC GLUCOSE mg/dl 112               Lines/Drains:  Invasive Devices       Peripheral Intravenous Line  Duration             Peripheral IV 06/12/24 Right Antecubital 4 days              Drain  Duration             External Urinary Catheter Small <1 day                          Imaging: Reviewed radiology reports from this admission including: CT head    Recent Cultures (last 7 days):         Last 24 Hours Medication List:   Current Facility-Administered Medications   Medication Dose Route Frequency Provider Last Rate    acetaminophen  325 mg Rectal Q8H PRN Kailey Ahumada, DO      aluminum-magnesium hydroxide-simethicone  30 mL Oral Q6H PRN Kailey Ahumada, DO      escitalopram  20 mg Oral Daily Kailey Ahumada, DO      levETIRAcetam  500 mg Oral Q12H [facility] Kailey Ahumada, DO      LORazepam  1 mg Intravenous Q4H PRN Monai Toanae, DO      melatonin  3 mg Oral HS Hattie Pacheco PA-C      morphine injection  2 mg Intravenous Q1H PRN Kailey Toanae, DO      ondansetron  4 mg Intravenous Q6H PRN Monai Toanae, DO      QUEtiapine  25 mg Oral HS Kailey Ahumada DO          Today, Patient Was Seen By: Kailey Ahumada DO    **Please Note: This note may have been constructed using a voice recognition system.**

## 2024-06-16 NOTE — OCCUPATIONAL THERAPY NOTE
Occupational Therapy Note     06/16/24 6852   Note Type   Note type Evaluation;Cancelled Session   Additional Comments OT orders received and chart  reviewed. Per chart pt has pending referrral/evaluation for hospice. Per discussion with CRISTEL Marks, family just want pt to be comfortable. Will follow up as appropriate.   Licensure   [place] License Number  Mary Rose E. Blanes, MS, OTR/L [id number]

## 2024-06-17 NOTE — PROGRESS NOTES
[facility]  Progress Note  Name: Rohith Zaldivar I  MRN: [id number]  Unit/Bed#: [address] [place] [address] I Date of Admission: 6/12/2024   Date of Service: 6/17/2024 I Hospital Day: 5    Assessment & Plan   * Subdural hematoma (HCC)  Assessment & Plan  84-year-old male with h/o pituitary adenoma s/p transnasal resection by Dr. Whitney in [place] > 10 years ago, HTN, bladder cancer s/p surgical resection, hyponatremia who has had forgetfulness for the past month and sustained a fall from the couch this morning     CT Head: Right greater than left acute mixed density bilateral cerebral convexity subdural hematomas with 0.8 cm leftward midline shift.     Neurosurgery was consulted and conversations were held with family regarding prognosis and treatment options  Ultimately patient and family decided to proceed with conservative management and being admitted under comfort measures with hospice consult via case management    6/14/24 Patient doing remarkably well and comfort measures have been rescinded    Updated neurology and neurosurgery regarding patient's status via Tiger text  Continue Keppra 500 mg twice daily for seizure prophylaxis  F/u  EEG results    Repeat CT head 6/14/24:   2.7 cm (previously 2.6 cm) thick mixed-density predominantly isodense subdural hematoma along right cerebral convexity with compressive mass effect on adjacent right cerebral hemisphere, partial effacement of right lateral ventricle, 1.0 cm leftward   midline shift (previously 0.8 cm), and suspected right uncal herniation. Recommend reassessment by neurosurgery.     1.2 cm thick mixed-density subdural hematoma along left cerebral convexity with mild adjacent mass effect on left parietal lobe, unchanged.    Family wishes no invasive treatments including surgery  Patient noted to have loss of function of right side  Hospice to re-evaluate patient  Patient will most likely pass in the next 24 - 48 hours    Goals of care,  counseling/discussion  Assessment & Plan  Patient doing remarkably well and comfort measures have been rescinded  CODE STATUS changed to the level 3 DNR/DNI    Patient deteriorated this morning  6/15 hospice to re-evaluate patient  Patient unable to move right side of body       Seizure (HCC)  Assessment & Plan  Started on Keppra 500 mg twice daily for seizure prophylaxis    EEG Interpretation: This Routine EEG recorded during wakefulness is abnormal. The tracing was limited due to excessive diffuse muscle artifact, but in visible sections of the tracing, background activities were mildly too slow suggesting mild diffuse cerebral dysfunction of non-specific etiology.     Brain compression (HCC)  Assessment & Plan  Compression of brain due to SDH a/e/b abnormal CT brain showing midline shift treated with neurosurgery consult, comfort care.   Continue with Keppra 500 mg twice daily for seizures prophylaxis    Anxiety  Assessment & Plan  Continue lexapro and ativan               VTE Pharmacologic Prophylaxis: VTE Score: 3 Moderate Risk (Score 3-4) - Pharmacological DVT Prophylaxis Contraindicated. Sequential Compression Devices Ordered.    Mobility:   Basic Mobility Inpatient Raw Score: 10  JH-HLM Goal: 4: Move to chair/commode  JH-HLM Achieved: 2: Bed activities/Dependent transfer  JH-HLM Goal NOT achieved. Continue with multidisciplinary rounding and encourage appropriate mobility to improve upon JH-HLM goals.    Patient Centered Rounds: I performed bedside rounds with nursing staff today.   Discussions with Specialists or Other Care Team Provider: case management    Education and Discussions with Family / Patient: Updated  (wife and daughter) at bedside.    Total Time Spent on Date of Encounter in care of patient: 35 mins. This time was spent on one or more of the following: performing physical exam; counseling and coordination of care; obtaining or reviewing history; documenting in the medical record;  reviewing/ordering tests, medications or procedures; communicating with other healthcare professionals and discussing with patient's family/caregivers.    Current Length of Stay: 5 day(s)  Current Patient Status: Inpatient   Certification Statement: The patient will continue to require additional inpatient hospital stay due to pending safe dispo  Discharge Plan: Anticipate discharge in 24-48 hrs to hospice    Code Status: Level 3 - DNAR and DNI    Subjective:   Patient laying in bed comfortably    Objective:     Vitals:   Temp (24hrs), Av.7 °F (36.5 °C), Min:96.7 °F (35.9 °C), Max:98.6 °F (37 °C)    Temp:  [96.7 °F (35.9 °C)-98.6 °F (37 °C)] 98.6 °F (37 °C)  HR:  [65-73] 69  Resp:  [18-20] 18  BP: (135-139)/(69-71) 135/69  SpO2:  [93 %-98 %] 94 %  Body mass index is 26.83 kg/m².     Input and Output Summary (last 24 hours):     Intake/Output Summary (Last 24 hours) at 2024 0951  Last data filed at 2024 2230  Gross per 24 hour   Intake --   Output 450 ml   Net -450 ml         Physical Exam:   Physical Exam  Vitals and nursing note reviewed.   Constitutional:       Appearance: He is well-developed. He is ill-appearing.   HENT:      Head: Normocephalic and atraumatic.   Eyes:      Conjunctiva/sclera: Conjunctivae normal.   Cardiovascular:      Rate and Rhythm: Normal rate and regular rhythm.      Heart sounds: No murmur heard.  Pulmonary:      Effort: Pulmonary effort is normal.      Breath sounds: Rhonchi present.   Abdominal:      Palpations: Abdomen is soft.      Tenderness: There is no abdominal tenderness.   Musculoskeletal:         General: No swelling.      Cervical back: Neck supple.   Skin:     General: Skin is warm and dry.   Neurological:      Mental Status: He is alert. He is disoriented.      Motor: Weakness present.      Comments: Right sided weakness, dysarthria   Psychiatric:         Mood and Affect: Mood normal.          Additional Data:     Labs:  Results from last 7 days   Lab Units  06/12/24  1102   WBC Thousand/uL 6.96   HEMOGLOBIN g/dL 12.2   HEMATOCRIT % 37.1   PLATELETS Thousands/uL 162   SEGS PCT % 88*   LYMPHO PCT % 6*   MONO PCT % 6   EOS PCT % 0     Results from last 7 days   Lab Units 06/12/24  1102   SODIUM mmol/L 132*   POTASSIUM mmol/L 4.4   CHLORIDE mmol/L 107   CO2 mmol/L 28   BUN mg/dL 32*   CREATININE mg/dL 1.40*   ANION GAP mmol/L -3*   CALCIUM mg/dL 9.4   ALBUMIN g/dL 4.2   TOTAL BILIRUBIN mg/dL 0.67   ALK PHOS U/L 78   ALT U/L 21   AST U/L 32   GLUCOSE RANDOM mg/dL 108     Results from last 7 days   Lab Units 06/12/24  1234   INR  1.10     Results from last 7 days   Lab Units 06/12/24  1044   POC GLUCOSE mg/dl 112               Lines/Drains:  Invasive Devices       Peripheral Intravenous Line  Duration             Peripheral IV 06/12/24 Right Antecubital 4 days              Drain  Duration             External Urinary Catheter Small <1 day                          Imaging: Reviewed radiology reports from this admission including: CT head    Recent Cultures (last 7 days):         Last 24 Hours Medication List:   Current Facility-Administered Medications   Medication Dose Route Frequency Provider Last Rate    acetaminophen  325 mg Rectal Q8H PRN Kailey Ahumada, DO      aluminum-magnesium hydroxide-simethicone  30 mL Oral Q6H PRN Kailey Ahumada, DO      escitalopram  20 mg Oral Daily Kailey Ahumada, DO      levETIRAcetam  500 mg Oral Q12H [facility] Kailey Ahumada DO      LORazepam  1 mg Intravenous Q4H PRN Kailey Ahumada, DO      melatonin  3 mg Oral HS Hattie Pacheco PA-C      morphine injection  2 mg Intravenous Q1H PRN Kailey Ahumada, DO      ondansetron  4 mg Intravenous Q6H PRN Kailey Ahumada, DO      QUEtiapine  25 mg Oral HS Kailey Ahumada DO          Today, Patient Was Seen By: Kailey Ahumada DO    **Please Note: This note may have been constructed using a voice recognition system.**

## 2024-06-17 NOTE — PHYSICAL THERAPY NOTE
PHYSICAL THERAPY     06/17/24 1300   Note Type   Note type Cancelled Session   Cancel Reasons Medical status  (pending comfort care, hospice. will re-attempt at a later time as appropriate.)   Licensure   [place] License Number  Olivia Menendez PT [id number]

## 2024-06-17 NOTE — PLAN OF CARE
Problem: Potential for Falls  Goal: Patient will remain free of falls  Description: INTERVENTIONS:  - Educate patient/family on patient safety including physical limitations  - Instruct patient to call for assistance with activity   - Consult OT/PT to assist with strengthening/mobility   - Keep Call bell within reach  - Keep bed low and locked with side rails adjusted as appropriate  - Keep care items and personal belongings within reach  - Initiate and maintain comfort rounds  - Make Fall Risk Sign visible to staff  - Offer Toileting every 2 Hours, in advance of need  - Initiate/Maintain bed alarm  - Obtain necessary fall risk management equipment: yellow socks  - Apply yellow socks and bracelet for high fall risk patients  - Consider moving patient to room near nurses station  Outcome: Progressing     Problem: PAIN - ADULT  Goal: Verbalizes/displays adequate comfort level or baseline comfort level  Description: Interventions:  - Encourage patient to monitor pain and request assistance  - Assess pain using appropriate pain scale  - Administer analgesics based on type and severity of pain and evaluate response  - Implement non-pharmacological measures as appropriate and evaluate response  - Consider cultural and social influences on pain and pain management  - Notify physician/advanced practitioner if interventions unsuccessful or patient reports new pain  Outcome: Progressing     Problem: INFECTION - ADULT  Goal: Absence or prevention of progression during hospitalization  Description: INTERVENTIONS:  - Assess and monitor for signs and symptoms of infection  - Monitor lab/diagnostic results  - Monitor all insertion sites, i.e. indwelling lines, tubes, and drains  - Monitor endotracheal if appropriate and nasal secretions for changes in amount and color  - [place] appropriate cooling/warming therapies per order  - Administer medications as ordered  - Instruct and encourage patient and family to use good hand  hygiene technique  - Identify and instruct in appropriate isolation precautions for identified infection/condition  Outcome: Progressing  Goal: Absence of fever/infection during neutropenic period  Description: INTERVENTIONS:  - Monitor WBC    Outcome: Progressing     Problem: SAFETY ADULT  Goal: Patient will remain free of falls  Description: INTERVENTIONS:  - Educate patient/family on patient safety including physical limitations  - Instruct patient to call for assistance with activity   - Consult OT/PT to assist with strengthening/mobility   - Keep Call bell within reach  - Keep bed low and locked with side rails adjusted as appropriate  - Keep care items and personal belongings within reach  - Initiate and maintain comfort rounds  - Make Fall Risk Sign visible to staff  - Offer Toileting every 2 Hours, in advance of need  - Initiate/Maintain bed alarm  - Obtain necessary fall risk management equipment: yellow socks  - Apply yellow socks and bracelet for high fall risk patients  - Consider moving patient to room near nurses station  Outcome: Progressing  Goal: Maintain or return to baseline ADL function  Description: INTERVENTIONS:  -  Assess patient's ability to carry out ADLs; assess patient's baseline for ADL function and identify physical deficits which impact ability to perform ADLs (bathing, care of mouth/teeth, toileting, grooming, dressing, etc.)  - Assess/evaluate cause of self-care deficits   - Assess range of motion  - Assess patient's mobility; develop plan if impaired  - Assess patient's need for assistive devices and provide as appropriate  - Encourage maximum independence but intervene and supervise when necessary  - Involve family in performance of ADLs  - Assess for home care needs following discharge   - Consider OT consult to assist with ADL evaluation and planning for discharge  - Provide patient education as appropriate  Outcome: Progressing  Goal: Maintains/Returns to pre admission  functional level  Description: INTERVENTIONS:  - Perform AM-PAC 6 Click Basic Mobility/ Daily Activity assessment daily.  - Set and communicate daily mobility goal to care team and patient/family/caregiver.   - Collaborate with rehabilitation services on mobility goals if consulted  - Perform Range of Motion 3 times a day.  - Reposition patient every 2 hours.  - Dangle patient 3 times a day  - Stand patient 3 times a day  - Ambulate patient 3 times a day  - Out of bed to chair 3 times a day   - Out of bed for meals 3 times a day  - Out of bed for toileting  - Record patient progress and toleration of activity level   Outcome: Progressing     Problem: DISCHARGE PLANNING  Goal: Discharge to home or other facility with appropriate resources  Description: INTERVENTIONS:  - Identify barriers to discharge w/patient and caregiver  - Arrange for needed discharge resources and transportation as appropriate  - Identify discharge learning needs (meds, wound care, etc.)  - Arrange for interpretive services to assist at discharge as needed  - Refer to Case Management Department for coordinating discharge planning if the patient needs post-hospital services based on physician/advanced practitioner order or complex needs related to functional status, cognitive ability, or social support system  Outcome: Progressing     Problem: Knowledge Deficit  Goal: Patient/family/caregiver demonstrates understanding of disease process, treatment plan, medications, and discharge instructions  Description: Complete learning assessment and assess knowledge base.  Interventions:  - Provide teaching at level of understanding  - Provide teaching via preferred learning methods  Outcome: Progressing     Problem: Prexisting or High Potential for Compromised Skin Integrity  Goal: Skin integrity is maintained or improved  Description: INTERVENTIONS:  - Identify patients at risk for skin breakdown  - Assess and monitor skin integrity  - Assess and monitor  nutrition and hydration status  - Monitor labs   - Assess for incontinence   - Turn and reposition patient  - Assist with mobility/ambulation  - Relieve pressure over bony prominences  - Avoid friction and shearing  - Provide appropriate hygiene as needed including keeping skin clean and dry  - Evaluate need for skin moisturizer/barrier cream  - Collaborate with interdisciplinary team   - Patient/family teaching  - Consider wound care consult   Outcome: Progressing     Problem: NEUROSENSORY - ADULT  Goal: Achieves stable or improved neurological status  Description: INTERVENTIONS  - Monitor and report changes in neurological status  - Monitor vital signs such as temperature, blood pressure, glucose, and any other labs ordered   - Initiate measures to prevent increased intracranial pressure  - Monitor for seizure activity and implement precautions if appropriate      Outcome: Progressing  Goal: Remains free of injury related to seizures activity  Description: INTERVENTIONS  - Maintain airway, patient safety  and administer oxygen as ordered  - Monitor patient for seizure activity, document and report duration and description of seizure to physician/advanced practitioner  - If seizure occurs,  ensure patient safety during seizure  - Reorient patient post seizure  - Seizure pads on all 4 side rails  - Instruct patient/family to notify RN of any seizure activity including if an aura is experienced  - Instruct patient/family to call for assistance with activity based on nursing assessment  - Administer anti-seizure medications if ordered    Outcome: Progressing  Goal: Achieves maximal functionality and self care  Description: INTERVENTIONS  - Monitor swallowing and airway patency with patient fatigue and changes in neurological status  - Encourage and assist patient to increase activity and self care.   - Encourage visually impaired, hearing impaired and aphasic patients to use assistive/communication devices  Outcome:  Progressing

## 2024-06-17 NOTE — OCCUPATIONAL THERAPY NOTE
Occupational Therapy Cancellation     Patient Name: Rohith Zaldivar  Today's Date: 6/17/2024  Problem List  Principal Problem:    Subdural hematoma (HCC)  Active Problems:    Brain compression (HCC)    Seizure (HCC)    Goals of care, counseling/discussion    Past Medical History  Past Medical History:   Diagnosis Date    Benign neoplasm of pituitary gland (HCC)     Organic impotence     Pneumonia     of left lower lobe due to infectious organism, last assessed 3/22/16, resolved 12/6/16    Transient cerebral ischemia      Past Surgical History  Past Surgical History:   Procedure Laterality Date    BRAIN SURGERY      CATARACT EXTRACTION Bilateral          06/17/24 1347   Note Type   Note type Cancelled Session  (Monday 6/17/24)   Cancel Reasons Medical status  (pend hospice consult)   Additional Comments OT orders received and spoke w/ CM, Shireen and PTOlivia. Will await consult and complete eval as appropriate pend plan of care.   Licensure   [place] License Number  Marcella Arguello, OTR/L [id number]     Marcella Arguello OTR/L  [id number]  [id number]

## 2024-06-17 NOTE — PLAN OF CARE
Problem: Potential for Falls  Goal: Patient will remain free of falls  Description: INTERVENTIONS:  - Educate patient/family on patient safety including physical limitations  - Instruct patient to call for assistance with activity   - Consult OT/PT to assist with strengthening/mobility   - Keep Call bell within reach  - Keep bed low and locked with side rails adjusted as appropriate  - Keep care items and personal belongings within reach  - Initiate and maintain comfort rounds  - Make Fall Risk Sign visible to staff  - Offer Toileting every 2 Hours, in advance of need  - Initiate/Maintain bed alarm  - Obtain necessary fall risk management equipment: yellow socks  - Apply yellow socks and bracelet for high fall risk patients  - Consider moving patient to room near nurses station  Outcome: Progressing     Problem: INFECTION - ADULT  Goal: Absence or prevention of progression during hospitalization  Description: INTERVENTIONS:  - Assess and monitor for signs and symptoms of infection  - Monitor lab/diagnostic results  - Monitor all insertion sites, i.e. indwelling lines, tubes, and drains  - Monitor endotracheal if appropriate and nasal secretions for changes in amount and color  - [place] appropriate cooling/warming therapies per order  - Administer medications as ordered  - Instruct and encourage patient and family to use good hand hygiene technique  - Identify and instruct in appropriate isolation precautions for identified infection/condition  Outcome: Progressing  Goal: Absence of fever/infection during neutropenic period  Description: INTERVENTIONS:  - Monitor WBC    Outcome: Progressing     Problem: SAFETY ADULT  Goal: Patient will remain free of falls  Description: INTERVENTIONS:  - Educate patient/family on patient safety including physical limitations  - Instruct patient to call for assistance with activity   - Consult OT/PT to assist with strengthening/mobility   - Keep Call bell within reach  - Keep bed  low and locked with side rails adjusted as appropriate  - Keep care items and personal belongings within reach  - Initiate and maintain comfort rounds  - Make Fall Risk Sign visible to staff  - Offer Toileting every 2 Hours, in advance of need  - Initiate/Maintain bed alarm  - Obtain necessary fall risk management equipment: yellow socks  - Apply yellow socks and bracelet for high fall risk patients  - Consider moving patient to room near nurses station  Outcome: Progressing  Goal: Maintain or return to baseline ADL function  Description: INTERVENTIONS:  -  Assess patient's ability to carry out ADLs; assess patient's baseline for ADL function and identify physical deficits which impact ability to perform ADLs (bathing, care of mouth/teeth, toileting, grooming, dressing, etc.)  - Assess/evaluate cause of self-care deficits   - Assess range of motion  - Assess patient's mobility; develop plan if impaired  - Assess patient's need for assistive devices and provide as appropriate  - Encourage maximum independence but intervene and supervise when necessary  - Involve family in performance of ADLs  - Assess for home care needs following discharge   - Consider OT consult to assist with ADL evaluation and planning for discharge  - Provide patient education as appropriate  Outcome: Progressing  Goal: Maintains/Returns to pre admission functional level  Description: INTERVENTIONS:  - Perform AM-PAC 6 Click Basic Mobility/ Daily Activity assessment daily.  - Set and communicate daily mobility goal to care team and patient/family/caregiver.   - Collaborate with rehabilitation services on mobility goals if consulted  - Perform Range of Motion 3 times a day.  - Reposition patient every 2 hours.  - Dangle patient 3 times a day  - Stand patient 3 times a day  - Ambulate patient 3 times a day  - Out of bed to chair 3 times a day   - Out of bed for meals 3 times a day  - Out of bed for toileting  - Record patient progress and  toleration of activity level   Outcome: Progressing     Problem: DISCHARGE PLANNING  Goal: Discharge to home or other facility with appropriate resources  Description: INTERVENTIONS:  - Identify barriers to discharge w/patient and caregiver  - Arrange for needed discharge resources and transportation as appropriate  - Identify discharge learning needs (meds, wound care, etc.)  - Arrange for interpretive services to assist at discharge as needed  - Refer to Case Management Department for coordinating discharge planning if the patient needs post-hospital services based on physician/advanced practitioner order or complex needs related to functional status, cognitive ability, or social support system  Outcome: Progressing     Problem: Knowledge Deficit  Goal: Patient/family/caregiver demonstrates understanding of disease process, treatment plan, medications, and discharge instructions  Description: Complete learning assessment and assess knowledge base.  Interventions:  - Provide teaching at level of understanding  - Provide teaching via preferred learning methods  Outcome: Progressing     Problem: Prexisting or High Potential for Compromised Skin Integrity  Goal: Skin integrity is maintained or improved  Description: INTERVENTIONS:  - Identify patients at risk for skin breakdown  - Assess and monitor skin integrity  - Assess and monitor nutrition and hydration status  - Monitor labs   - Assess for incontinence   - Turn and reposition patient  - Assist with mobility/ambulation  - Relieve pressure over bony prominences  - Avoid friction and shearing  - Provide appropriate hygiene as needed including keeping skin clean and dry  - Evaluate need for skin moisturizer/barrier cream  - Collaborate with interdisciplinary team   - Patient/family teaching  - Consider wound care consult   Outcome: Progressing     Problem: NEUROSENSORY - ADULT  Goal: Achieves stable or improved neurological status  Description: INTERVENTIONS  -  Monitor and report changes in neurological status  - Monitor vital signs such as temperature, blood pressure, glucose, and any other labs ordered   - Initiate measures to prevent increased intracranial pressure  - Monitor for seizure activity and implement precautions if appropriate      Outcome: Progressing  Goal: Remains free of injury related to seizures activity  Description: INTERVENTIONS  - Maintain airway, patient safety  and administer oxygen as ordered  - Monitor patient for seizure activity, document and report duration and description of seizure to physician/advanced practitioner  - If seizure occurs,  ensure patient safety during seizure  - Reorient patient post seizure  - Seizure pads on all 4 side rails  - Instruct patient/family to notify RN of any seizure activity including if an aura is experienced  - Instruct patient/family to call for assistance with activity based on nursing assessment  - Administer anti-seizure medications if ordered    Outcome: Progressing  Goal: Achieves maximal functionality and self care  Description: INTERVENTIONS  - Monitor swallowing and airway patency with patient fatigue and changes in neurological status  - Encourage and assist patient to increase activity and self care.   - Encourage visually impaired, hearing impaired and aphasic patients to use assistive/communication devices  Outcome: Progressing

## 2024-06-18 NOTE — TELEPHONE ENCOUNTER
06/21/2024- PT STILL IN HOSPITAL    06/20/2024- PT STILL IN HOSPITAL    06/18/2024- PT STILL IN HOSPITAL

## 2024-06-18 NOTE — PHYSICAL THERAPY NOTE
PHYSICAL THERAPY     06/18/24 1525   Note Type   Note type Cancelled Session   Cancel Reasons Medical status   Additional Comments patient transitioned to [facility] 4-comfort care. PT orders discontinued at this time. Please re-consult if appropriate at a later time.   Licensure   [place] License Number  Olivia Shon PT [id number]

## 2024-06-18 NOTE — PROGRESS NOTES
Spiritual Care Progress Note    2024  Patient: Rohith Zaldivar : 1940  Admission Date & Time: 2024 1024  MRN: [id number] CSN: [id number]     followed-up with patient per comfort care status. Patient resting comfortably, no visitors at bedside. Patient received anointing on 24 from [facility]. Spiritual care remains available to support.     24 0900   Clinical Encounter Type   Visited With Patient   Routine Visit Follow-up   Referral From Departmental follow-up

## 2024-06-18 NOTE — PROGRESS NOTES
[facility]  Progress Note  Name: Rohith Zaldivar I  MRN: [id number]  Unit/Bed#: [address] [place] [address] I Date of Admission: 6/12/2024   Date of Service: 6/18/2024 I Hospital Day: 6    Assessment & Plan   Goals of care, counseling/discussion  Assessment & Plan  Continue comfort measures       Seizure (HCC)  Assessment & Plan  Started on Keppra 500 mg twice daily for seizure prophylaxis    EEG Interpretation: This Routine EEG recorded during wakefulness is abnormal. The tracing was limited due to excessive diffuse muscle artifact, but in visible sections of the tracing, background activities were mildly too slow suggesting mild diffuse cerebral dysfunction of non-specific etiology.   Keppra stopped given transition to comfort     Brain compression (HCC)  Assessment & Plan  Compression of brain due to SDH a/e/b abnormal CT brain showing midline shift treated with neurosurgery consult, comfort care.   Comfort measures only    * Subdural hematoma (HCC)  Assessment & Plan  84-year-old male with h/o pituitary adenoma s/p transnasal resection by Dr. Whitney in [place] > 10 years ago, HTN, bladder cancer s/p surgical resection, hyponatremia who has had forgetfulness for the past month and sustained a fall from the couch this morning     CT Head: Right greater than left acute mixed density bilateral cerebral convexity subdural hematomas with 0.8 cm leftward midline shift.     Neurosurgery was consulted and conversations were held with family regarding prognosis and treatment options  Ultimately patient and family decided to proceed with conservative management and being admitted under comfort measures with hospice consult via case management    6/14/24 Patient doing remarkably well and comfort measures have been rescinded    Updated neurology and neurosurgery regarding patient's status via Tiger text  Continue Keppra 500 mg twice daily for seizure prophylaxis  F/u  EEG results    Repeat CT head 6/14/24:   2.7 cm  (previously 2.6 cm) thick mixed-density predominantly isodense subdural hematoma along right cerebral convexity with compressive mass effect on adjacent right cerebral hemisphere, partial effacement of right lateral ventricle, 1.0 cm leftward   midline shift (previously 0.8 cm), and suspected right uncal herniation. Recommend reassessment by neurosurgery.     1.2 cm thick mixed-density subdural hematoma along left cerebral convexity with mild adjacent mass effect on left parietal lobe, unchanged.    Family wishes no invasive treatments including surgery  Patient noted to have loss of function of right side  Comfort measures only           VTE Pharmacologic Prophylaxis: VTE Score: 3 N/A    Mobility:   Basic Mobility Inpatient Raw Score: 10  JH-HLM Goal: 4: Move to chair/commode  JH-HLM Achieved: 1: Laying in bed  JH-HLM Goal NOT achieved. Continue with multidisciplinary rounding and encourage appropriate mobility to improve upon JH-HLM goals.    Patient Centered Rounds: I performed bedside rounds with nursing staff today.   Discussions with Specialists or Other Care Team Provider: CM    Education and Discussions with Family / Patient: Attempted to update  (wife) via phone. Unable to contact.    Total Time Spent on Date of Encounter in care of patient: 15 mins. This time was spent on one or more of the following: performing physical exam; counseling and coordination of care; obtaining or reviewing history; documenting in the medical record; reviewing/ordering tests, medications or procedures; communicating with other healthcare professionals and discussing with patient's family/caregivers.    Current Length of Stay: 6 day(s)  Current Patient Status: Inpatient   Certification Statement: The patient will continue to require additional inpatient hospital stay due to comfort measures  Discharge Plan:     Code Status: Level 4 - Comfort Care    Subjective:   Seen in AM. Asleep and appears comfortable      Objective:     Vitals:   Temp (24hrs), Av.6 °F (37.6 °C), Min:97.3 °F (36.3 °C), Max:101.2 °F (38.4 °C)    Temp:  [97.3 °F (36.3 °C)-101.2 °F (38.4 °C)] 101.2 °F (38.4 °C)  HR:  [72-97] 97  Resp:  [14-20] 20  BP: (105-133)/(70-87) 105/71  SpO2:  [89 %-94 %] 89 %  Body mass index is 26.83 kg/m².     Input and Output Summary (last 24 hours):     Intake/Output Summary (Last 24 hours) at 2024 1440  Last data filed at 2024 2225  Gross per 24 hour   Intake 0 ml   Output 1100 ml   Net -1100 ml       Physical Exam:   Physical Exam deferred     Additional Data:     Labs:  Results from last 7 days   Lab Units 24  1102   WBC Thousand/uL 6.96   HEMOGLOBIN g/dL 12.2   HEMATOCRIT % 37.1   PLATELETS Thousands/uL 162   SEGS PCT % 88*   LYMPHO PCT % 6*   MONO PCT % 6   EOS PCT % 0     Results from last 7 days   Lab Units 24  1102   SODIUM mmol/L 132*   POTASSIUM mmol/L 4.4   CHLORIDE mmol/L 107   CO2 mmol/L 28   BUN mg/dL 32*   CREATININE mg/dL 1.40*   ANION GAP mmol/L -3*   CALCIUM mg/dL 9.4   ALBUMIN g/dL 4.2   TOTAL BILIRUBIN mg/dL 0.67   ALK PHOS U/L 78   ALT U/L 21   AST U/L 32   GLUCOSE RANDOM mg/dL 108     Results from last 7 days   Lab Units 24  1234   INR  1.10     Results from last 7 days   Lab Units 24  1044   POC GLUCOSE mg/dl 112               Lines/Drains:  Invasive Devices       Peripheral Intravenous Line  Duration             Peripheral IV 24 Distal;Dorsal (posterior);Right Forearm 1 day              Drain  Duration             External Urinary Catheter Small 2 days                          Imaging: Reviewed radiology reports from this admission including: CT head    Recent Cultures (last 7 days):         Last 24 Hours Medication List:   Current Facility-Administered Medications   Medication Dose Route Frequency Provider Last Rate    acetaminophen  325 mg Rectal Q8H PRN Kailey Ahumada DO      aluminum-magnesium hydroxide-simethicone  30 mL Oral Q6H PRN Kailey Ahumada DO       glycopyrrolate  0.1 mg Intravenous Q4H PRN Pandi Todhe, DO      LORazepam  1 mg Intravenous Q4H PRN Pandi Todhe, DO      morphine  1 mg/hr Intravenous Continuous Pandi Todhe, DO 1 mg/hr (06/17/24 1530)    morphine injection  2 mg Intravenous Q1H PRN Pandi Todhe, DO      ondansetron  4 mg Intravenous Q6H PRN Pandi Todhe, DO      scopolamine  1 patch Transdermal Q72H Pandi Todhe, DO          Today, Patient Was Seen By: Sandip Quintero MD    **Please Note: This note may have been constructed using a voice recognition system.**

## 2024-06-18 NOTE — OCCUPATIONAL THERAPY NOTE
Occupational Therapy Cancellation / Discharge     Patient Name: Rohith Zaldivar  Today's Date: 6/18/2024  Problem List  Principal Problem:    Subdural hematoma (HCC)  Active Problems:    Brain compression (HCC)    Seizure (HCC)    Goals of care, counseling/discussion    Past Medical History  Past Medical History:   Diagnosis Date    Benign neoplasm of pituitary gland (HCC)     Organic impotence     Pneumonia     of left lower lobe due to infectious organism, last assessed 3/22/16, resolved 12/6/16    Transient cerebral ischemia      Past Surgical History  Past Surgical History:   Procedure Laterality Date    BRAIN SURGERY      CATARACT EXTRACTION Bilateral         06/18/24 0824   Note Type   Note type Cancelled Session  (Tuesday 6/18/24)   Cancel Reasons Other  (pt transitioned to level 4 comfort care)   Additional Comments Chart review completed. Will DC OT orders. Pt transitioned to level 4 comfort care. Please re - consult as appropriate. DC OT   Licensure   [place] License Number  Marcella Arguello OTR/L [id number]     Marcella Arguello OTR/L  [id number]  [id number]

## 2024-06-18 NOTE — CASE MANAGEMENT
Case Management Progress Note    Patient name Rohith Zaldivar  Location [address] [address]-* MRN [id number]  : 1940 Date 2024       LOS (days): 6  Geometric Mean LOS (GMLOS) (days): 4.7  Days to GMLOS:-1.4        OBJECTIVE:      Current admission status: Inpatient  Preferred Pharmacy:   [id number] - [place], [place] - [address] [address]  [address] [place] [id number]  Phone: [phone] Fax: [phone]    Primary Care Provider: Frank Lombardi, DO    [facility]  Secondary Insurance:     PROGRESS NOTE:    SW received hospice consult and spoke with wife Vida and other family members at bedside regarding same.  Vida stated that she feels patient is being kept comfortable by the team and does not feel that a hospice referral is needed at this time as patient has been transitioned to comfort care status.  SW offered emotional support and will continue to follow.

## 2024-06-18 NOTE — PLAN OF CARE
Problem: Potential for Falls  Goal: Patient will remain free of falls  Description: INTERVENTIONS:  - Educate patient/family on patient safety including physical limitations  - Instruct patient to call for assistance with activity   - Consult OT/PT to assist with strengthening/mobility   - Keep Call bell within reach  - Keep bed low and locked with side rails adjusted as appropriate  - Keep care items and personal belongings within reach  - Initiate and maintain comfort rounds  - Make Fall Risk Sign visible to staff  - Offer Toileting every 2 Hours, in advance of need  - Initiate/Maintain bed alarm  - Obtain necessary fall risk management equipment: yellow socks  - Apply yellow socks and bracelet for high fall risk patients  - Consider moving patient to room near nurses station  Outcome: Progressing     Problem: PAIN - ADULT  Goal: Verbalizes/displays adequate comfort level or baseline comfort level  Description: Interventions:  - Encourage patient to monitor pain and request assistance  - Assess pain using appropriate pain scale  - Administer analgesics based on type and severity of pain and evaluate response  - Implement non-pharmacological measures as appropriate and evaluate response  - Consider cultural and social influences on pain and pain management  - Notify physician/advanced practitioner if interventions unsuccessful or patient reports new pain  Outcome: Progressing     Problem: SAFETY ADULT  Goal: Patient will remain free of falls  Description: INTERVENTIONS:  - Educate patient/family on patient safety including physical limitations  - Instruct patient to call for assistance with activity   - Consult OT/PT to assist with strengthening/mobility   - Keep Call bell within reach  - Keep bed low and locked with side rails adjusted as appropriate  - Keep care items and personal belongings within reach  - Initiate and maintain comfort rounds  - Make Fall Risk Sign visible to staff  - Offer Toileting every 2  Hours, in advance of need  - Initiate/Maintain bed alarm  - Obtain necessary fall risk management equipment: yellow socks  - Apply yellow socks and bracelet for high fall risk patients  - Consider moving patient to room near nurses station  Outcome: Progressing     Problem: Prexisting or High Potential for Compromised Skin Integrity  Goal: Skin integrity is maintained or improved  Description: INTERVENTIONS:  - Identify patients at risk for skin breakdown  - Assess and monitor skin integrity  - Assess and monitor nutrition and hydration status  - Monitor labs   - Assess for incontinence   - Turn and reposition patient  - Assist with mobility/ambulation  - Relieve pressure over bony prominences  - Avoid friction and shearing  - Provide appropriate hygiene as needed including keeping skin clean and dry  - Evaluate need for skin moisturizer/barrier cream  - Collaborate with interdisciplinary team   - Patient/family teaching  - Consider wound care consult   Outcome: Progressing

## 2024-06-19 NOTE — PLAN OF CARE
Problem: Potential for Falls  Goal: Patient will remain free of falls  Description: INTERVENTIONS:  - Educate patient/family on patient safety including physical limitations  - Instruct patient to call for assistance with activity   - Consult OT/PT to assist with strengthening/mobility   - Keep Call bell within reach  - Keep bed low and locked with side rails adjusted as appropriate  - Keep care items and personal belongings within reach  - Initiate and maintain comfort rounds  - Make Fall Risk Sign visible to staff  - Offer Toileting every 2 Hours, in advance of need  - Initiate/Maintain bed alarm  - Obtain necessary fall risk management equipment: yellow socks  - Apply yellow socks and bracelet for high fall risk patients  - Consider moving patient to room near nurses station  Outcome: Progressing     Problem: PAIN - ADULT  Goal: Verbalizes/displays adequate comfort level or baseline comfort level  Description: Interventions:  - Encourage patient to monitor pain and request assistance  - Assess pain using appropriate pain scale  - Administer analgesics based on type and severity of pain and evaluate response  - Implement non-pharmacological measures as appropriate and evaluate response  - Consider cultural and social influences on pain and pain management  - Notify physician/advanced practitioner if interventions unsuccessful or patient reports new pain  Outcome: Progressing     Problem: SAFETY ADULT  Goal: Patient will remain free of falls  Description: INTERVENTIONS:  - Educate patient/family on patient safety including physical limitations  - Instruct patient to call for assistance with activity   - Consult OT/PT to assist with strengthening/mobility   - Keep Call bell within reach  - Keep bed low and locked with side rails adjusted as appropriate  - Keep care items and personal belongings within reach  - Initiate and maintain comfort rounds  - Make Fall Risk Sign visible to staff  - Offer Toileting every 2  Hours, in advance of need  - Initiate/Maintain bed alarm  - Obtain necessary fall risk management equipment: yellow socks  - Apply yellow socks and bracelet for high fall risk patients  - Consider moving patient to room near nurses station  Outcome: Progressing  Goal: Maintain or return to baseline ADL function  Description: INTERVENTIONS:  -  Assess patient's ability to carry out ADLs; assess patient's baseline for ADL function and identify physical deficits which impact ability to perform ADLs (bathing, care of mouth/teeth, toileting, grooming, dressing, etc.)  - Assess/evaluate cause of self-care deficits   - Assess range of motion  - Assess patient's mobility; develop plan if impaired  - Assess patient's need for assistive devices and provide as appropriate  - Encourage maximum independence but intervene and supervise when necessary  - Involve family in performance of ADLs  - Assess for home care needs following discharge   - Consider OT consult to assist with ADL evaluation and planning for discharge  - Provide patient education as appropriate  Outcome: Progressing  Goal: Maintains/Returns to pre admission functional level  Description: INTERVENTIONS:  - Perform AM-PAC 6 Click Basic Mobility/ Daily Activity assessment daily.  - Set and communicate daily mobility goal to care team and patient/family/caregiver.   - Collaborate with rehabilitation services on mobility goals if consulted  - Perform Range of Motion 3 times a day.  - Reposition patient every 2 hours.  - Dangle patient 3 times a day  - Stand patient 3 times a day  - Ambulate patient 3 times a day  - Out of bed to chair 3 times a day   - Out of bed for meals 3 times a day  - Out of bed for toileting  - Record patient progress and toleration of activity level   Outcome: Progressing     Problem: Knowledge Deficit  Goal: Patient/family/caregiver demonstrates understanding of disease process, treatment plan, medications, and discharge  instructions  Description: Complete learning assessment and assess knowledge base.  Interventions:  - Provide teaching at level of understanding  - Provide teaching via preferred learning methods  Outcome: Progressing     Problem: Prexisting or High Potential for Compromised Skin Integrity  Goal: Skin integrity is maintained or improved  Description: INTERVENTIONS:  - Identify patients at risk for skin breakdown  - Assess and monitor skin integrity  - Assess and monitor nutrition and hydration status  - Monitor labs   - Assess for incontinence   - Turn and reposition patient  - Assist with mobility/ambulation  - Relieve pressure over bony prominences  - Avoid friction and shearing  - Provide appropriate hygiene as needed including keeping skin clean and dry  - Evaluate need for skin moisturizer/barrier cream  - Collaborate with interdisciplinary team   - Patient/family teaching  - Consider wound care consult   Outcome: Progressing

## 2024-06-19 NOTE — PROGRESS NOTES
[facility]  Progress Note  Name: Rohith Zaldivar I  MRN: [id number]  Unit/Bed#: [address] [address] I Date of Admission: 6/12/2024   Date of Service: 6/19/2024 I Hospital Day: 7    Assessment & Plan   * Subdural hematoma (HCC)  Assessment & Plan  84-year-old male with h/o pituitary adenoma s/p transnasal resection by Dr. Whitney in [place] > 10 years ago, HTN, bladder cancer s/p surgical resection, hyponatremia who has had forgetfulness for the past month and sustained a fall from the couch this morning     CT Head: Right greater than left acute mixed density bilateral cerebral convexity subdural hematomas with 0.8 cm leftward midline shift.     Neurosurgery was consulted and conversations were held with family regarding prognosis and treatment options  Ultimately patient and family decided to proceed with conservative management and being admitted under comfort measures with hospice consult via case management    Repeat CT head 6/14/24:   2.7 cm (previously 2.6 cm) thick mixed-density predominantly isodense subdural hematoma along right cerebral convexity with compressive mass effect on adjacent right cerebral hemisphere, partial effacement of right lateral ventricle, 1.0 cm leftward   midline shift (previously 0.8 cm), and suspected right uncal herniation. Recommend reassessment by neurosurgery.     1.2 cm thick mixed-density subdural hematoma along left cerebral convexity with mild adjacent mass effect on left parietal lobe, unchanged.    Pt has been made CMO.  Currently on IV Morphine drip.  Discussed care with family at bedside              VTE Pharmacologic Prophylaxis: VTE Score: 3  pt is comfort measures only    Mobility:   Basic Mobility Inpatient Raw Score: 6  -[facility] Goal: 2: Bed activities/Dependent transfer  -[facility] Achieved: 1: Laying in bed  Pt is comfort measures only     Patient Centered Rounds: I performed bedside rounds with nursing staff today.   Discussions with Specialists or Other Care  Team Provider: Case Management     Education and Discussions with Family / Patient: Updated  (daughter) at bedside.    Total Time Spent on Date of Encounter in care of patient: 35 mins. This time was spent on one or more of the following: performing physical exam; counseling and coordination of care; obtaining or reviewing history; documenting in the medical record; reviewing/ordering tests, medications or procedures; communicating with other healthcare professionals and discussing with patient's family/caregivers.    Current Length of Stay: 7 day(s)  Current Patient Status: Inpatient   Certification Statement: The patient will continue to require additional inpatient hospital stay due to comfort measures only  Discharge Plan:  pt is planned to  inpatient     Code Status: Level 4 - Comfort Care    Subjective:   Pt was seen and examined at bedside.  Currently on IV Morphine drip.  Appears comfortable.  Responds slightly to voice/touch stimuli.      Objective:     Vitals:   Temp (24hrs), Av.9 °F (37.2 °C), Min:97.7 °F (36.5 °C), Max:101.2 °F (38.4 °C)    Temp:  [97.7 °F (36.5 °C)-101.2 °F (38.4 °C)] 97.7 °F (36.5 °C)  HR:  [70-97] 70  Resp:  [18-19] 18  BP: (126-143)/(78-89) 143/89  SpO2:  [78 %-92 %] 92 %  Body mass index is 26.83 kg/m².     Input and Output Summary (last 24 hours):   No intake or output data in the 24 hours ending 24 1128    Physical Exam:   General: in no acute distress  Skin: no jaundice  CVS: RRR, murmur present   Lungs: CTAL, no wheezing or rales appreciated  Abdomen: soft, nondistended, bowel sounds normal, nontender upon palpation, no guarding or rebound tenderness  Extremities: no edema, no calf swelling or tenderness  Neuro: alert to voice/touch stimuli; no tremors   Psych: lethargic      Additional Data:     Labs:          Results from last 7 days   Lab Units 24  1234   INR  1.10                   Lines/Drains:  Invasive Devices       Peripheral  Intravenous Line  Duration             Peripheral IV 06/17/24 Distal;Dorsal (posterior);Right Forearm 1 day              Drain  Duration             External Urinary Catheter Small 3 days                          Imaging: No pertinent imaging reviewed.    Recent Cultures (last 7 days):         Last 24 Hours Medication List:   Current Facility-Administered Medications   Medication Dose Route Frequency Provider Last Rate    acetaminophen  325 mg Rectal Q8H PRN Pandi Todhe, DO      aluminum-magnesium hydroxide-simethicone  30 mL Oral Q6H PRN Pandi Todhe, DO      glycopyrrolate  0.1 mg Intravenous Q4H PRN Pandi Todhe, DO      LORazepam  1 mg Intravenous Q4H PRN Pandi Todhe, DO      morphine  1 mg/hr Intravenous Continuous Pandi Todhe, DO 1 mg/hr (06/17/24 1530)    morphine injection  2 mg Intravenous Q1H PRN Pandi Todhe, DO      ondansetron  4 mg Intravenous Q6H PRN Pandi Todhe, DO      scopolamine  1 patch Transdermal Q72H Pandi Todhe, DO          Today, Patient Was Seen By: Olya Patterson MD    **Please Note: This note may have been constructed using a voice recognition system.**

## 2024-06-19 NOTE — ASSESSMENT & PLAN NOTE
84-year-old male with h/o pituitary adenoma s/p transnasal resection by Dr. Whitney in [place] > 10 years ago, HTN, bladder cancer s/p surgical resection, hyponatremia who has had forgetfulness for the past month and sustained a fall from the couch this morning     CT Head: Right greater than left acute mixed density bilateral cerebral convexity subdural hematomas with 0.8 cm leftward midline shift.     Neurosurgery was consulted and conversations were held with family regarding prognosis and treatment options  Ultimately patient and family decided to proceed with conservative management and being admitted under comfort measures with hospice consult via case management    Repeat CT head 6/14/24:   2.7 cm (previously 2.6 cm) thick mixed-density predominantly isodense subdural hematoma along right cerebral convexity with compressive mass effect on adjacent right cerebral hemisphere, partial effacement of right lateral ventricle, 1.0 cm leftward   midline shift (previously 0.8 cm), and suspected right uncal herniation. Recommend reassessment by neurosurgery.     1.2 cm thick mixed-density subdural hematoma along left cerebral convexity with mild adjacent mass effect on left parietal lobe, unchanged.    Pt has been made CMO.  Currently on IV Morphine drip.  Discussed care with family at bedside

## 2024-06-20 NOTE — PROGRESS NOTES
[facility]  Progress Note  Name: Rohith Zaldivar I  MRN: [id number]  [address]/Bed#: [address] [address] I Date of Admission: 6/12/2024   Date of Service: 6/20/2024 I Hospital Day: 8    Assessment & Plan   * Subdural hematoma (HCC)  Assessment & Plan  84-year-old male with h/o pituitary adenoma s/p transnasal resection by Dr. Whitney in [place] > 10 years ago, HTN, bladder cancer s/p surgical resection, hyponatremia who has had forgetfulness for the past month and sustained a fall from the couch this morning     CT Head: Right greater than left acute mixed density bilateral cerebral convexity subdural hematomas with 0.8 cm leftward midline shift.     Neurosurgery was consulted and conversations were held with family regarding prognosis and treatment options  Ultimately patient and family decided to proceed with conservative management and being admitted under comfort measures with hospice consult via case management    Repeat CT head 6/14/24:   2.7 cm (previously 2.6 cm) thick mixed-density predominantly isodense subdural hematoma along right cerebral convexity with compressive mass effect on adjacent right cerebral hemisphere, partial effacement of right lateral ventricle, 1.0 cm leftward   midline shift (previously 0.8 cm), and suspected right uncal herniation. Recommend reassessment by neurosurgery.     1.2 cm thick mixed-density subdural hematoma along left cerebral convexity with mild adjacent mass effect on left parietal lobe, unchanged.    Pt has been made comfort measures only.  Currently on IV Morphine drip with plan to titrate to comfort              VTE Pharmacologic Prophylaxis: VTE Score: 3  pt is comfort measures only     Mobility:   Basic Mobility Inpatient Raw Score: 6  -[facility] Goal: 2: Bed activities/Dependent transfer  -[facility] Achieved: 1: Laying in bed  Pt is comfort measures only     Patient Centered Rounds: I performed bedside rounds with nursing staff today.   Discussions with Specialists or  Other Care Team Provider: Case Management     Education and Discussions with Family / Patient: Updated  (daughter) at bedside.    Total Time Spent on Date of Encounter in care of patient: 35 mins. This time was spent on one or more of the following: performing physical exam; counseling and coordination of care; obtaining or reviewing history; documenting in the medical record; reviewing/ordering tests, medications or procedures; communicating with other healthcare professionals and discussing with patient's family/caregivers.    Current Length of Stay: 8 day(s)  Current Patient Status: Inpatient   Certification Statement:  pt is comfort measures only and expected to  during this admission   Discharge Plan:  pt is expected to  during this admission     Code Status: Level 4 - Comfort Care    Subjective:   Pt was seen and examined at bedside.  Pt is lethargic and arousable to deep stimulation.      Objective:     Vitals:   Temp (24hrs), Av.8 °F (37.1 °C), Min:98.4 °F (36.9 °C), Max:99.3 °F (37.4 °C)    Temp:  [98.4 °F (36.9 °C)-99.3 °F (37.4 °C)] 99.3 °F (37.4 °C)  HR:  [75-81] 75  Resp:  [16-18] 16  BP: (117-118)/(65-67) 117/65  SpO2:  [91 %-94 %] 94 %  Body mass index is 26.83 kg/m².     Input and Output Summary (last 24 hours):     Intake/Output Summary (Last 24 hours) at 2024 1051  Last data filed at 2024 1900  Gross per 24 hour   Intake --   Output 1150 ml   Net -1150 ml       Physical Exam:   General: in no acute distress  Skin: no jaundice  CVS: RRR, murmur present   Lungs: CTAL, no wheezing or rales appreciated  Abdomen: soft, nondistended, bowel sounds normal  Extremities: no edema, no calf swelling or tenderness  Neuro: lethargic; arousable to touch stimulation, no tremors noted   Psych: lethargic       Additional Data:     Labs:                            Lines/Drains:  Invasive Devices       Peripheral Intravenous Line  Duration             Peripheral IV 24  Distal;Dorsal (posterior);Right Forearm 2 days              Drain  Duration             External Urinary Catheter Small 4 days                          Imaging: No pertinent imaging reviewed.    Recent Cultures (last 7 days):         Last 24 Hours Medication List:   Current Facility-Administered Medications   Medication Dose Route Frequency Provider Last Rate    acetaminophen  325 mg Rectal Q8H PRN Pandi Todhe, DO      aluminum-magnesium hydroxide-simethicone  30 mL Oral Q6H PRN Pandi Todhe, DO      glycopyrrolate  0.1 mg Intravenous Q4H PRN Pandi Todhe, DO      LORazepam  1 mg Intravenous Q4H PRN Pandi Todhe, DO      morphine  1 mg/hr Intravenous Continuous Pandi Todhe, DO 1 mg/hr (06/17/24 1530)    morphine injection  2 mg Intravenous Q1H PRN Pandi Todhe, DO      ondansetron  4 mg Intravenous Q6H PRN Pandi Todhe, DO      scopolamine  1 patch Transdermal Q72H Pandi Todhe, DO          Today, Patient Was Seen By: Olya Patterson MD    **Please Note: This note may have been constructed using a voice recognition system.**

## 2024-06-20 NOTE — ASSESSMENT & PLAN NOTE
84-year-old male with h/o pituitary adenoma s/p transnasal resection by Dr. Whitney in [place] > 10 years ago, HTN, bladder cancer s/p surgical resection, hyponatremia who has had forgetfulness for the past month and sustained a fall from the couch this morning     CT Head: Right greater than left acute mixed density bilateral cerebral convexity subdural hematomas with 0.8 cm leftward midline shift.     Neurosurgery was consulted and conversations were held with family regarding prognosis and treatment options  Ultimately patient and family decided to proceed with conservative management and being admitted under comfort measures with hospice consult via case management    Repeat CT head 6/14/24:   2.7 cm (previously 2.6 cm) thick mixed-density predominantly isodense subdural hematoma along right cerebral convexity with compressive mass effect on adjacent right cerebral hemisphere, partial effacement of right lateral ventricle, 1.0 cm leftward   midline shift (previously 0.8 cm), and suspected right uncal herniation. Recommend reassessment by neurosurgery.     1.2 cm thick mixed-density subdural hematoma along left cerebral convexity with mild adjacent mass effect on left parietal lobe, unchanged.    Pt has been made comfort measures only.  Currently on IV Morphine drip with plan to titrate to comfort

## 2024-06-20 NOTE — PLAN OF CARE
Problem: Potential for Falls  Goal: Patient will remain free of falls  Description: INTERVENTIONS:  - Educate patient/family on patient safety including physical limitations  - Instruct patient to call for assistance with activity   - Consult OT/PT to assist with strengthening/mobility   - Keep Call bell within reach  - Keep bed low and locked with side rails adjusted as appropriate  - Keep care items and personal belongings within reach  - Initiate and maintain comfort rounds  - Make Fall Risk Sign visible to staff  - Offer Toileting every 2 Hours, in advance of need  - Initiate/Maintain bed alarm  - Obtain necessary fall risk management equipment: yellow socks  - Apply yellow socks and bracelet for high fall risk patients  - Consider moving patient to room near nurses station  Outcome: Progressing     Problem: PAIN - ADULT  Goal: Verbalizes/displays adequate comfort level or baseline comfort level  Description: Interventions:  - Encourage patient to monitor pain and request assistance  - Assess pain using appropriate pain scale  - Administer analgesics based on type and severity of pain and evaluate response  - Implement non-pharmacological measures as appropriate and evaluate response  - Consider cultural and social influences on pain and pain management  - Notify physician/advanced practitioner if interventions unsuccessful or patient reports new pain  Outcome: Progressing     Problem: SAFETY ADULT  Goal: Patient will remain free of falls  Description: INTERVENTIONS:  - Educate patient/family on patient safety including physical limitations  - Instruct patient to call for assistance with activity   - Consult OT/PT to assist with strengthening/mobility   - Keep Call bell within reach  - Keep bed low and locked with side rails adjusted as appropriate  - Keep care items and personal belongings within reach  - Initiate and maintain comfort rounds  - Make Fall Risk Sign visible to staff  - Offer Toileting every 2  Hours, in advance of need  - Initiate/Maintain bed alarm  - Obtain necessary fall risk management equipment: yellow socks  - Apply yellow socks and bracelet for high fall risk patients  - Consider moving patient to room near nurses station  Outcome: Progressing  Goal: Maintain or return to baseline ADL function  Description: INTERVENTIONS:  -  Assess patient's ability to carry out ADLs; assess patient's baseline for ADL function and identify physical deficits which impact ability to perform ADLs (bathing, care of mouth/teeth, toileting, grooming, dressing, etc.)  - Assess/evaluate cause of self-care deficits   - Assess range of motion  - Assess patient's mobility; develop plan if impaired  - Assess patient's need for assistive devices and provide as appropriate  - Encourage maximum independence but intervene and supervise when necessary  - Involve family in performance of ADLs  - Assess for home care needs following discharge   - Consider OT consult to assist with ADL evaluation and planning for discharge  - Provide patient education as appropriate  Outcome: Progressing  Goal: Maintains/Returns to pre admission functional level  Description: INTERVENTIONS:  - Perform AM-PAC 6 Click Basic Mobility/ Daily Activity assessment daily.  - Set and communicate daily mobility goal to care team and patient/family/caregiver.   - Collaborate with rehabilitation services on mobility goals if consulted  - Perform Range of Motion 3 times a day.  - Reposition patient every 2 hours.  - Record patient progress and toleration of activity level   Outcome: Progressing     Problem: Knowledge Deficit  Goal: Patient/family/caregiver demonstrates understanding of disease process, treatment plan, medications, and discharge instructions  Description: Complete learning assessment and assess knowledge base.  Interventions:  - Provide teaching at level of understanding  - Provide teaching via preferred learning methods  Outcome: Progressing      Problem: Prexisting or High Potential for Compromised Skin Integrity  Goal: Skin integrity is maintained or improved  Description: INTERVENTIONS:  - Identify patients at risk for skin breakdown  - Assess and monitor skin integrity  - Assess and monitor nutrition and hydration status  - Monitor labs   - Assess for incontinence   - Turn and reposition patient  - Assist with mobility/ambulation  - Relieve pressure over bony prominences  - Avoid friction and shearing  - Provide appropriate hygiene as needed including keeping skin clean and dry  - Evaluate need for skin moisturizer/barrier cream  - Collaborate with interdisciplinary team   - Patient/family teaching  - Consider wound care consult   Outcome: Progressing

## 2024-06-21 NOTE — PLAN OF CARE
Problem: PAIN - ADULT  Goal: Verbalizes/displays adequate comfort level or baseline comfort level  Description: Interventions:  - Encourage patient to monitor pain and request assistance  - Assess pain using appropriate pain scale  - Administer analgesics based on type and severity of pain and evaluate response  - Implement non-pharmacological measures as appropriate and evaluate response  - Consider cultural and social influences on pain and pain management  - Notify physician/advanced practitioner if interventions unsuccessful or patient reports new pain  Outcome: Progressing     Problem: Potential for Falls  Goal: Patient will remain free of falls  Description: INTERVENTIONS:  - Educate patient/family on patient safety including physical limitations  - Instruct patient to call for assistance with activity   - Consult OT/PT to assist with strengthening/mobility   - Keep Call bell within reach  - Keep bed low and locked with side rails adjusted as appropriate  - Keep care items and personal belongings within reach  - Initiate and maintain comfort rounds  - Make Fall Risk Sign visible to staff  - Offer Toileting every 2 Hours, in advance of need  - Initiate/Maintain bed alarm  - Obtain necessary fall risk management equipment: yellow socks  - Apply yellow socks and bracelet for high fall risk patients  - Consider moving patient to room near nurses station  Outcome: Progressing     Problem: Prexisting or High Potential for Compromised Skin Integrity  Goal: Skin integrity is maintained or improved  Description: INTERVENTIONS:  - Identify patients at risk for skin breakdown  - Assess and monitor skin integrity  - Assess and monitor nutrition and hydration status  - Monitor labs   - Assess for incontinence   - Turn and reposition patient  - Assist with mobility/ambulation  - Relieve pressure over bony prominences  - Avoid friction and shearing  - Provide appropriate hygiene as needed including keeping skin clean and  dry  - Evaluate need for skin moisturizer/barrier cream  - Collaborate with interdisciplinary team   - Patient/family teaching  - Consider wound care consult   Outcome: Progressing

## 2024-06-21 NOTE — PROGRESS NOTES
[facility]  Progress Note  Name: Rohith Zaldivar I  MRN: [id number]  Unit/Bed#: [address] [address] I Date of Admission: 6/12/2024   Date of Service: 6/21/2024 I Hospital Day: 9    Assessment & Plan   * Subdural hematoma (HCC)  Assessment & Plan  84-year-old male with h/o pituitary adenoma s/p transnasal resection by Dr. Whitney in [place] > 10 years ago, HTN, bladder cancer s/p surgical resection, hyponatremia who has had forgetfulness for the past month and sustained a fall from the couch this morning     CT Head: Right greater than left acute mixed density bilateral cerebral convexity subdural hematomas with 0.8 cm leftward midline shift.     Neurosurgery was consulted and conversations were held with family regarding prognosis and treatment options  Ultimately patient and family decided to proceed with conservative management and being admitted under comfort measures with hospice consult via case management    Repeat CT head 6/14/24:   2.7 cm (previously 2.6 cm) thick mixed-density predominantly isodense subdural hematoma along right cerebral convexity with compressive mass effect on adjacent right cerebral hemisphere, partial effacement of right lateral ventricle, 1.0 cm leftward   midline shift (previously 0.8 cm), and suspected right uncal herniation. Recommend reassessment by neurosurgery.     1.2 cm thick mixed-density subdural hematoma along left cerebral convexity with mild adjacent mass effect on left parietal lobe, unchanged.    Pt has been made comfort measures only.  Resume IV Morphine drip with parameters to titrate to comfort              VTE Pharmacologic Prophylaxis: VTE Score: 3  pt is comfort measures only     Mobility:   Basic Mobility Inpatient Raw Score: 6  -[facility] Goal: 2: Bed activities/Dependent transfer  -[facility] Achieved: 1: Laying in bed  pt is comfort measures only     Patient Centered Rounds: I performed bedside rounds with nursing staff today.   Discussions with Specialists or  Other Care Team Provider: Case Management    Education and Discussions with Family / Patient: Updated  (daughter) at bedside.    Total Time Spent on Date of Encounter in care of patient: 35 mins. This time was spent on one or more of the following: performing physical exam; counseling and coordination of care; obtaining or reviewing history; documenting in the medical record; reviewing/ordering tests, medications or procedures; communicating with other healthcare professionals and discussing with patient's family/caregivers.    Current Length of Stay: 9 day(s)  Current Patient Status: Inpatient   Certification Statement: The patient will continue to require additional inpatient hospital stay due to pt is comfort measures only and expected to  during this admission   Discharge Plan:  pt is comfort measures only and expected to  this admission     Code Status: Level 4 - Comfort Care    Subjective:   Pt was seen and examined at bedside.  Has more labored breathing today.  Not as arousable as yesterday.       Objective:     Vitals:   Temp (24hrs), Av.8 °F (37.7 °C), Min:99.6 °F (37.6 °C), Max:100.1 °F (37.8 °C)    Temp:  [99.6 °F (37.6 °C)-100.1 °F (37.8 °C)] 99.6 °F (37.6 °C)  HR:  [81-86] 86  Resp:  [16] 16  BP: (117-121)/(67-71) 121/71  SpO2:  [88 %-91 %] 91 %  Body mass index is 26.83 kg/m².     Input and Output Summary (last 24 hours):     Intake/Output Summary (Last 24 hours) at 2024 1025  Last data filed at 2024 1501  Gross per 24 hour   Intake --   Output 450 ml   Net -450 ml       Physical Exam:   General: in no acute distress  Skin: no jaundice  CVS: RRR, murmur present   Lungs: CTAL, no wheezing or rales appreciated  Abdomen: soft, nondistended, bowel sounds normal, nontender upon palpation, no guarding or rebound tenderness  Extremities: no edema, no calf swelling or tenderness  Neuro: lethargic; arousable to deep stimulation; no tremors noted  Psych: lethargic        Additional Data:     Labs:                            Lines/Drains:  Invasive Devices       Peripheral Intravenous Line  Duration             Peripheral IV 06/17/24 Distal;Dorsal (posterior);Right Forearm 3 days              Drain  Duration             External Urinary Catheter Small 5 days                          Imaging: No pertinent imaging reviewed.    Recent Cultures (last 7 days):         Last 24 Hours Medication List:   Current Facility-Administered Medications   Medication Dose Route Frequency Provider Last Rate    acetaminophen  325 mg Rectal Q8H PRN Pandi Todhe, DO      aluminum-magnesium hydroxide-simethicone  30 mL Oral Q6H PRN Pandi Todhe, DO      glycopyrrolate  0.1 mg Intravenous Q4H PRN Pandi Todhe, DO      LORazepam  1 mg Intravenous Q4H PRN Pandi Todhe, DO      morphine  1 mg/hr Intravenous Continuous Pandi Todhe, DO 1 mg/hr (06/21/24 0854)    morphine injection  2 mg Intravenous Q1H PRN Pandi Todhe, DO      ondansetron  4 mg Intravenous Q6H PRN Pandi Todhe, DO      scopolamine  1 patch Transdermal Q72H Pandi Todhe, DO          Today, Patient Was Seen By: Olya Patterson MD    **Please Note: This note may have been constructed using a voice recognition system.**

## 2024-06-21 NOTE — ASSESSMENT & PLAN NOTE
84-year-old male with h/o pituitary adenoma s/p transnasal resection by Dr. Whitney in [place] > 10 years ago, HTN, bladder cancer s/p surgical resection, hyponatremia who has had forgetfulness for the past month and sustained a fall from the couch this morning     CT Head: Right greater than left acute mixed density bilateral cerebral convexity subdural hematomas with 0.8 cm leftward midline shift.     Neurosurgery was consulted and conversations were held with family regarding prognosis and treatment options  Ultimately patient and family decided to proceed with conservative management and being admitted under comfort measures with hospice consult via case management    Repeat CT head 6/14/24:   2.7 cm (previously 2.6 cm) thick mixed-density predominantly isodense subdural hematoma along right cerebral convexity with compressive mass effect on adjacent right cerebral hemisphere, partial effacement of right lateral ventricle, 1.0 cm leftward   midline shift (previously 0.8 cm), and suspected right uncal herniation. Recommend reassessment by neurosurgery.     1.2 cm thick mixed-density subdural hematoma along left cerebral convexity with mild adjacent mass effect on left parietal lobe, unchanged.    Pt has been made comfort measures only.  Resume IV Morphine drip with parameters to titrate to comfort

## 2024-06-22 NOTE — ASSESSMENT & PLAN NOTE
84-year-old male with h/o pituitary adenoma s/p transnasal resection by Dr. Whitney in [place] > 10 years ago, HTN, bladder cancer s/p surgical resection, hyponatremia who has had forgetfulness for the past month and sustained a fall from the couch this morning     CT Head: Right greater than left acute mixed density bilateral cerebral convexity subdural hematomas with 0.8 cm leftward midline shift.     Neurosurgery was consulted and conversations were held with family regarding prognosis and treatment options  Ultimately patient and family decided to proceed with conservative management and being admitted under comfort measures with hospice consult via case management    Repeat CT head 6/14/24:   2.7 cm (previously 2.6 cm) thick mixed-density predominantly isodense subdural hematoma along right cerebral convexity with compressive mass effect on adjacent right cerebral hemisphere, partial effacement of right lateral ventricle, 1.0 cm leftward   midline shift (previously 0.8 cm), and suspected right uncal herniation. Recommend reassessment by neurosurgery.     1.2 cm thick mixed-density subdural hematoma along left cerebral convexity with mild adjacent mass effect on left parietal lobe, unchanged.    Pt has been made comfort measures only.  Resume IV Morphine drip with parameters to titrate to comfort.  Scopolamine patch ordered

## 2024-06-22 NOTE — PLAN OF CARE
Problem: Potential for Falls  Goal: Patient will remain free of falls  Description: INTERVENTIONS:  - Educate patient/family on patient safety including physical limitations  - Instruct patient to call for assistance with activity   - Consult OT/PT to assist with strengthening/mobility   - Keep Call bell within reach  - Keep bed low and locked with side rails adjusted as appropriate  - Keep care items and personal belongings within reach  - Initiate and maintain comfort rounds  - Make Fall Risk Sign visible to staff  - Offer Toileting every 2 Hours, in advance of need  - Initiate/Maintain bed alarm  - Obtain necessary fall risk management equipment: yellow socks  - Apply yellow socks and bracelet for high fall risk patients  - Consider moving patient to room near nurses station  Outcome: Progressing     Problem: PAIN - ADULT  Goal: Verbalizes/displays adequate comfort level or baseline comfort level  Description: Interventions:  - Encourage patient to monitor pain and request assistance  - Assess pain using appropriate pain scale  - Administer analgesics based on type and severity of pain and evaluate response  - Implement non-pharmacological measures as appropriate and evaluate response  - Consider cultural and social influences on pain and pain management  - Notify physician/advanced practitioner if interventions unsuccessful or patient reports new pain  Outcome: Progressing     Problem: Prexisting or High Potential for Compromised Skin Integrity  Goal: Skin integrity is maintained or improved  Description: INTERVENTIONS:  - Identify patients at risk for skin breakdown  - Assess and monitor skin integrity  - Assess and monitor nutrition and hydration status  - Monitor labs   - Assess for incontinence   - Turn and reposition patient  - Assist with mobility/ambulation  - Relieve pressure over bony prominences  - Avoid friction and shearing  - Provide appropriate hygiene as needed including keeping skin clean and  dry  - Evaluate need for skin moisturizer/barrier cream  - Collaborate with interdisciplinary team   - Patient/family teaching  - Consider wound care consult   Outcome: Progressing

## 2024-06-22 NOTE — PROGRESS NOTES
[facility]  Progress Note  Name: Rohith Zaldivar I  MRN: [id number]  Unit/Bed#: [address] [address] I Date of Admission: 6/12/2024   Date of Service: 6/22/2024 I Hospital Day: 10    Assessment & Plan   * Subdural hematoma (HCC)  Assessment & Plan  84-year-old male with h/o pituitary adenoma s/p transnasal resection by Dr. Whitney in [place] > 10 years ago, HTN, bladder cancer s/p surgical resection, hyponatremia who has had forgetfulness for the past month and sustained a fall from the couch this morning     CT Head: Right greater than left acute mixed density bilateral cerebral convexity subdural hematomas with 0.8 cm leftward midline shift.     Neurosurgery was consulted and conversations were held with family regarding prognosis and treatment options  Ultimately patient and family decided to proceed with conservative management and being admitted under comfort measures with hospice consult via case management    Repeat CT head 6/14/24:   2.7 cm (previously 2.6 cm) thick mixed-density predominantly isodense subdural hematoma along right cerebral convexity with compressive mass effect on adjacent right cerebral hemisphere, partial effacement of right lateral ventricle, 1.0 cm leftward   midline shift (previously 0.8 cm), and suspected right uncal herniation. Recommend reassessment by neurosurgery.     1.2 cm thick mixed-density subdural hematoma along left cerebral convexity with mild adjacent mass effect on left parietal lobe, unchanged.    Pt has been made comfort measures only.  Resume IV Morphine drip with parameters to titrate to comfort.  Scopolamine patch ordered            VTE Pharmacologic Prophylaxis: VTE Score: 3  pt is comfort measures only     Mobility:   Basic Mobility Inpatient Raw Score: 6  -[facility] Goal: 2: Bed activities/Dependent transfer  -[facility] Achieved: 1: Laying in bed  pt is comfort measures only     Patient Centered Rounds: I performed bedside rounds with nursing staff today.    Discussions with Specialists or Other Care Team Provider: Case Management     Education and Discussions with Family / Patient:  daughter at bedside.     Total Time Spent on Date of Encounter in care of patient: 35 mins. This time was spent on one or more of the following: performing physical exam; counseling and coordination of care; obtaining or reviewing history; documenting in the medical record; reviewing/ordering tests, medications or procedures; communicating with other healthcare professionals and discussing with patient's family/caregivers.    Current Length of Stay: 10 day(s)  Current Patient Status: Inpatient   Certification Statement: The patient will continue to require additional inpatient hospital stay due to pt is comfort measures only and expected to  this admission   Discharge Plan:  pt is comfort measures only and expected to  this admission     Code Status: Level 4 - Comfort Care    Subjective:   Pt was seen and examined at bedside.  Has more labored breathing today.  Appears comfortable in no acute distress.       Objective:     Vitals:   Temp (24hrs), Av.7 °F (37.1 °C), Min:96.2 °F (35.7 °C), Max:99.6 °F (37.6 °C)    Temp:  [96.2 °F (35.7 °C)-99.6 °F (37.6 °C)] 99.4 °F (37.4 °C)  HR:  [82-86] 82  Resp:  [16] 16  BP: (116-121)/(70-71) 116/70  SpO2:  [90 %-91 %] 91 %  Body mass index is 26.83 kg/m².     Input and Output Summary (last 24 hours):     Intake/Output Summary (Last 24 hours) at 2024 0956  Last data filed at 2024 0900  Gross per 24 hour   Intake --   Output 590 ml   Net -590 ml       Physical Exam:   General: in no acute distress  Skin: no jaundice  CVS: RRR, murmur present   Lungs: CTAL, no wheezing or rales appreciated  Abdomen: soft, nondistended, bowel sounds normal, nontender upon palpation  Extremities: no edema, no calf swelling or tenderness  Neuro: lethargic; no tremors   Psych: lethargic       Additional Data:     Labs:                             Lines/Drains:  Invasive Devices       Peripheral Intravenous Line  Duration             Peripheral IV 06/17/24 Distal;Dorsal (posterior);Right Forearm 4 days              Drain  Duration             External Urinary Catheter Small 5 days                          Imaging: No pertinent imaging reviewed.    Recent Cultures (last 7 days):         Last 24 Hours Medication List:   Current Facility-Administered Medications   Medication Dose Route Frequency Provider Last Rate    acetaminophen  325 mg Rectal Q8H PRN Pandi Todhe, DO      aluminum-magnesium hydroxide-simethicone  30 mL Oral Q6H PRN Pandi Todhe, DO      glycopyrrolate  0.1 mg Intravenous Q4H PRN Pandi Todhe, DO      LORazepam  1 mg Intravenous Q4H PRN Pandi Todhe, DO      morphine  2 mg/hr Intravenous Continuous Olya Patterson MD 2 mg/hr (06/21/24 1505)    morphine injection  2 mg Intravenous Q1H PRN Pandi Todhe, DO      ondansetron  4 mg Intravenous Q6H PRN Pandi Todhe, DO      scopolamine  1 patch Transdermal Q72H Pandi Todhe, DO          Today, Patient Was Seen By: Olya Patterson MD    **Please Note: This note may have been constructed using a voice recognition system.**

## 2024-06-23 NOTE — PROGRESS NOTES
[facility]  Progress Note  Name: Rohith Zaldivar I  MRN: [id number]  Unit/Bed#: [address] [address] I Date of Admission: 6/12/2024   Date of Service: 6/23/2024 I Hospital Day: 11    Assessment & Plan   * Subdural hematoma (HCC)  Assessment & Plan  84-year-old male with h/o pituitary adenoma s/p transnasal resection by Dr. Whitney in [place] > 10 years ago, HTN, bladder cancer s/p surgical resection, hyponatremia who has had forgetfulness for the past month and sustained a fall from the couch this morning     CT Head: Right greater than left acute mixed density bilateral cerebral convexity subdural hematomas with 0.8 cm leftward midline shift.     Neurosurgery was consulted and conversations were held with family regarding prognosis and treatment options  Ultimately patient and family decided to proceed with conservative management and being admitted under comfort measures with hospice consult via case management    Repeat CT head 6/14/24:   2.7 cm (previously 2.6 cm) thick mixed-density predominantly isodense subdural hematoma along right cerebral convexity with compressive mass effect on adjacent right cerebral hemisphere, partial effacement of right lateral ventricle, 1.0 cm leftward   midline shift (previously 0.8 cm), and suspected right uncal herniation. Recommend reassessment by neurosurgery.     1.2 cm thick mixed-density subdural hematoma along left cerebral convexity with mild adjacent mass effect on left parietal lobe, unchanged.    Pt has been made comfort measures only.  Resume IV Morphine drip with parameters to titrate to comfort             VTE Pharmacologic Prophylaxis: VTE Score: 3  pt is comfort measure only     Mobility:   Basic Mobility Inpatient Raw Score: 6  -[facility] Goal: 2: Bed activities/Dependent transfer  -[facility] Achieved: 1: Laying in bed  pt is comfort measure only     Patient Centered Rounds: I performed bedside rounds with nursing staff today.   Discussions with Specialists or  Other Care Team Provider: Case Management    Education and Discussions with Family / Patient: Updated  (daughter) at bedside.    Total Time Spent on Date of Encounter in care of patient: 35 mins. This time was spent on one or more of the following: performing physical exam; counseling and coordination of care; obtaining or reviewing history; documenting in the medical record; reviewing/ordering tests, medications or procedures; communicating with other healthcare professionals and discussing with patient's family/caregivers.    Current Length of Stay: 11 day(s)  Current Patient Status: Inpatient   Certification Statement: The patient will continue to require additional inpatient hospital stay due to pt is comfort measure only and expected to  during this admission   Discharge Plan:  pt is comfort measure only and expected to  during this admission    Code Status: Level 4 - Comfort Care    Subjective:   Pt was seen and examined at bedside.  Pt is lethargic today and only arousable to deep stimulation.  Appears comfortable with no signs of pain or distress.       Objective:     Vitals:   Temp (24hrs), Av.4 °F (38 °C), Min:97.7 °F (36.5 °C), Max:101.9 °F (38.8 °C)    Temp:  [97.7 °F (36.5 °C)-101.9 °F (38.8 °C)] 101.9 °F (38.8 °C)  HR:  [] 108  Resp:  [20] 20  BP: (78)/(54) 78/54  SpO2:  [87 %-88 %] 87 %  Body mass index is 26.83 kg/m².     Input and Output Summary (last 24 hours):     Intake/Output Summary (Last 24 hours) at 2024 1015  Last data filed at 2024 0633  Gross per 24 hour   Intake --   Output 650 ml   Net -650 ml       Physical Exam:   General: in no acute distress  Skin: no jaundice  CVS: RRR, murmur present  Lungs: no wheezing or rales appreciated  Abdomen: soft, nondistended, nontender upon palpation  Extremities: no edema, no calf swelling or tenderness  Neuro: lethargic; arousable to deep stimulation   Psych: lethargic       Additional Data:     Labs:                             Lines/Drains:  Invasive Devices       Peripheral Intravenous Line  Duration             Peripheral IV 06/17/24 Distal;Dorsal (posterior);Right Forearm 5 days              Drain  Duration             External Urinary Catheter Small 7 days                          Imaging: No pertinent imaging reviewed.    Recent Cultures (last 7 days):         Last 24 Hours Medication List:   Current Facility-Administered Medications   Medication Dose Route Frequency Provider Last Rate    acetaminophen  325 mg Rectal Q8H PRN Pandi Todhe, DO      aluminum-magnesium hydroxide-simethicone  30 mL Oral Q6H PRN Pandi Todhe, DO      glycopyrrolate  0.1 mg Intravenous Q4H PRN Pandi Todhe, DO      LORazepam  1 mg Intravenous Q4H PRN Pandi Todhe, DO      morphine  2 mg/hr Intravenous Continuous Olya Patterson MD 2 mg/hr (06/21/24 1505)    morphine injection  2 mg Intravenous Q1H PRN Pandi Todhe, DO      ondansetron  4 mg Intravenous Q6H PRN Pandi Todhe, DO      scopolamine  1 patch Transdermal Q72H Pandi Todhe, DO          Today, Patient Was Seen By: Olya Patterson MD    **Please Note: This note may have been constructed using a voice recognition system.**

## 2024-06-24 NOTE — DISCHARGE SUMMARY
[facility]  Discharge- Rohith Zaldivar 1940, 84 y.o. male MRN: [id number]  Unit/Bed#: [address] [place] [address] Encounter: [id number]  Primary Care Provider: Frank Lombardi, DO   Date and time admitted to hospital: 6/12/2024 10:24 AM    * Subdural hematoma (HCC)  Assessment & Plan  84-year-old male with h/o pituitary adenoma s/p transnasal resection by Dr. Whitney in [place] > 10 years ago, HTN, bladder cancer s/p surgical resection, hyponatremia who has had forgetfulness for the past month and sustained a fall from the couch this morning     CT Head: Right greater than left acute mixed density bilateral cerebral convexity subdural hematomas with 0.8 cm leftward midline shift.     Neurosurgery was consulted and conversations were held with family regarding prognosis and treatment options  Ultimately patient and family decided to proceed with conservative management and being admitted under comfort measures with hospice consult via case management    Repeat CT head 6/14/24:   2.7 cm (previously 2.6 cm) thick mixed-density predominantly isodense subdural hematoma along right cerebral convexity with compressive mass effect on adjacent right cerebral hemisphere, partial effacement of right lateral ventricle, 1.0 cm leftward   midline shift (previously 0.8 cm), and suspected right uncal herniation. Recommend reassessment by neurosurgery.     1.2 cm thick mixed-density subdural hematoma along left cerebral convexity with mild adjacent mass effect on left parietal lobe, unchanged.    Pt was made comfort measures only.  Was on IV Morphine drip with parameters to titrate to comfort    Patient passed away this morning peacefully.  Family notified.        Discharge Summary - [facility]    Patient Information: Rohith Zaldivar 84 y.o. male MRN: [id number]  Unit/Bed#: [address] [place] [address] Encounter: [id number]    Discharging Physician / Practitioner: JAKE Watts  PCP: Frank Lombardi, DO  Admission Date:  2024  Discharge Date: 24    Reason for Admission: Subdural hematoma    Discharge Diagnoses:     Principal Problem:    Subdural hematoma (HCC)  Active Problems:    Brain compression (HCC)    Seizure (HCC)    Goals of care, counseling/discussion  Resolved Problems:    * No resolved hospital problems. *      Consultations During Hospital Stay:  Neurosurgery    Procedures Performed:     None    Significant Findings:     As above    Incidental Findings:   N/A    Test Results Pending at Discharge (will require follow up):   N/A     Outpatient Tests Requested:  N/A    Complications: N/A    Hospital Course:     Rohith Zaldivar is a 84 y.o. male patient who originally presented to the hospital on 2024 due to subdural hematoma from fall.  Neurosurgery was consulted.  Patient and family decided on conservative management and patient was admitted under comfort measures.  Patient was on morphine drip.  Patient passed away at 0332 AM peacefully.  Notified daughter over the phone.  Condolences offered.            Condition at Discharge: Patient      Discharge Day Visit / Exam:     * Please refer to separate progress for these details *    Discharge instructions/Information to patient and family:   See after visit summary for information provided to patient and family.      Provisions for Follow-Up Care:  See after visit summary for information related to follow-up care and any pertinent home health orders.      Disposition:     Other: Patient     For Discharges to [facility]:   Not Applicable to this Patient - Not Applicable to this Patient    Planned Readmission: NA    Discharge Statement:  I spent 15 minutes discharging the patient. This time was spent on the day of discharge. I had direct contact with the patient on the day of discharge. Greater than 50% of the total time was spent examining patient, answering all patient questions, arranging and discussing plan of care with patient  as well as directly providing post-discharge instructions.  Additional time then spent on discharge activities.    Discharge Medications:  See after visit summary for reconciled discharge medications provided to patient and family.      ** Please Note: Dragon 360 Dictation voice to text software may have been used in the creation of this document. **

## 2024-06-24 NOTE — DEATH NOTE
INPATIENT DEATH NOTE  Rohith Zaldivar 84 y.o. male MRN: [id number]  Unit/Bed#: [address] [place] [address] Encounter: [id number]    Date, Time and Cause of Death    Date of Death: 24  Time of Death:  3:32 AM  Preliminary Cause of Death: Subdural hematoma (HCC)  Entered by: LUX JOSEPH[CY1.1]       Attribution       CY1.1 JAKE Watts 24 03:37             Patient's Information  Pronounced by: LUX JOSEPH  Did the patient's death occur in the ED?: No  Did the patient's death occur in the OR?: No  Did the patient's death occur less than 10 days post-op?: No  Did the patient's death occur within 24 hours of admission?: No  Was code status DNR at the time of death?: Yes (comfort measures only)    PHYSICAL EXAM:  Unresponsive to noxious stimuli, Spontaneous respirations absent, Carotid pulse absent, and Corneal blink reflex absent    Medical Examiner notification criteria:  NONE APPLICABLE   Medical Examiner's office notified?:  No, does not meet ME notification criteria   Medical Examiner accepted case?:  NA  Name of Medical Examiner: NA    Family Notification  Was the family notified?: Yes  Date Notified: 24  Time Notified: 343  Notified by: LUX JOSEPH   Relationship to Patient: Daughter  Family Notification Route: Telephone  Was the family told to contact a  home?: Yes  Name of  Home[place] [facility] [place] [facility]   [phone]    Autopsy Options:  NA    Primary Service Attending Physician notified?:  yes - Attending:  Olya Patterson MD    Physician/Resident responsible for completing Discharge Summary:  LUX JOSEPH

## 2024-06-24 NOTE — ASSESSMENT & PLAN NOTE
84-year-old male with h/o pituitary adenoma s/p transnasal resection by Dr. Whitney in [place] > 10 years ago, HTN, bladder cancer s/p surgical resection, hyponatremia who has had forgetfulness for the past month and sustained a fall from the couch this morning     CT Head: Right greater than left acute mixed density bilateral cerebral convexity subdural hematomas with 0.8 cm leftward midline shift.     Neurosurgery was consulted and conversations were held with family regarding prognosis and treatment options  Ultimately patient and family decided to proceed with conservative management and being admitted under comfort measures with hospice consult via case management    Repeat CT head 6/14/24:   2.7 cm (previously 2.6 cm) thick mixed-density predominantly isodense subdural hematoma along right cerebral convexity with compressive mass effect on adjacent right cerebral hemisphere, partial effacement of right lateral ventricle, 1.0 cm leftward   midline shift (previously 0.8 cm), and suspected right uncal herniation. Recommend reassessment by neurosurgery.     1.2 cm thick mixed-density subdural hematoma along left cerebral convexity with mild adjacent mass effect on left parietal lobe, unchanged.    Pt was made comfort measures only.  Was on IV Morphine drip with parameters to titrate to comfort    Patient passed away this morning peacefully.  Family notified.

## 2024-06-24 NOTE — PROGRESS NOTES
Daughter and wife came. Emotional support provided. Post mortem care rendered. Body transferred to [facility].

## 2024-06-24 NOTE — NURSING NOTE
Patient observed with no spontaneous breathing,no palpable pulse,on comfort care.Hospitalist Aline Sexton notified.

## 2024-06-25 NOTE — UTILIZATION REVIEW
NOTIFICATION OF ADMISSION DISCHARGE   This is a Notification of Discharge from [facility]. Please be advised that this patient has been discharge from our facility. Below you will find the admission and discharge date and time including the patient’s disposition.   UTILIZATION REVIEW CONTACT:  Stephanie Harris  Utilization   Network Utilization Review Department  Phone: [phone] x carefully listen to the prompts. All voicemails are confidential.  Email: [email or web address]     ADMISSION INFORMATION  PRESENTATION DATE: 2024 10:24 AM  OBERVATION ADMISSION DATE:   INPATIENT ADMISSION DATE: 24  1:20 PM   DISCHARGE DATE: 2024  5:06 AM   DISPOSITION:    Network Utilization Review Department  ATTENTION: Please call with any questions or concerns to [phone] and carefully listen to the prompts so that you are directed to the right person. All voicemails are confidential.   For Discharge needs, contact Care Management DC Support Team at [phone] opt. 2  Send all requests for admission clinical reviews, approved or denied determinations and any other requests to dedicated fax number below belonging to the campus where the patient is receiving treatment. List of dedicated fax numbers for the Facilities:  FACILITY NAME UR FAX NUMBER   ADMISSION DENIALS (Administrative/Medical Necessity) [phone]   DISCHARGE SUPPORT TEAM ([facility]) [phone]   PARENT CHILD HEALTH (Maternity/NICU/Pediatrics) [phone]   [facility] [phone]   [facility] [phone]   [facility] [phone]   [facility] [phone]   [facility] [phone]   [facility] [phone]   [facility] [phone]   [facility] [phone]    [facility] [phone]   [facility] [phone]   [facility] [phone]   [facility] [phone]